# Patient Record
Sex: FEMALE | Race: WHITE | NOT HISPANIC OR LATINO | Employment: FULL TIME | URBAN - METROPOLITAN AREA
[De-identification: names, ages, dates, MRNs, and addresses within clinical notes are randomized per-mention and may not be internally consistent; named-entity substitution may affect disease eponyms.]

---

## 2022-09-19 ENCOUNTER — TRANSFERRED RECORDS (OUTPATIENT)
Dept: HEALTH INFORMATION MANAGEMENT | Facility: CLINIC | Age: 57
End: 2022-09-19

## 2022-12-02 ENCOUNTER — CARE COORDINATION (OUTPATIENT)
Dept: ENDOCRINOLOGY | Facility: CLINIC | Age: 57
End: 2022-12-02

## 2022-12-02 NOTE — PROGRESS NOTES
Called patient to remind her of her in person visit with Neyda Campos on Monday. I reminded her to do her questionnaire as well. She acknowledged this.     Carol Mariano, EMT

## 2022-12-05 ENCOUNTER — LAB (OUTPATIENT)
Dept: LAB | Facility: CLINIC | Age: 57
End: 2022-12-05
Payer: COMMERCIAL

## 2022-12-05 ENCOUNTER — OFFICE VISIT (OUTPATIENT)
Dept: ENDOCRINOLOGY | Facility: CLINIC | Age: 57
End: 2022-12-05
Payer: COMMERCIAL

## 2022-12-05 ENCOUNTER — TELEPHONE (OUTPATIENT)
Dept: ENDOCRINOLOGY | Facility: CLINIC | Age: 57
End: 2022-12-05

## 2022-12-05 VITALS
OXYGEN SATURATION: 98 % | HEIGHT: 62 IN | DIASTOLIC BLOOD PRESSURE: 71 MMHG | HEART RATE: 86 BPM | SYSTOLIC BLOOD PRESSURE: 133 MMHG | BODY MASS INDEX: 46.46 KG/M2 | WEIGHT: 252.5 LBS

## 2022-12-05 DIAGNOSIS — E66.01 CLASS 3 SEVERE OBESITY WITH SERIOUS COMORBIDITY AND BODY MASS INDEX (BMI) OF 45.0 TO 49.9 IN ADULT, UNSPECIFIED OBESITY TYPE (H): ICD-10-CM

## 2022-12-05 DIAGNOSIS — E66.813 CLASS 3 SEVERE OBESITY WITH SERIOUS COMORBIDITY AND BODY MASS INDEX (BMI) OF 45.0 TO 49.9 IN ADULT, UNSPECIFIED OBESITY TYPE (H): ICD-10-CM

## 2022-12-05 DIAGNOSIS — R73.03 PRE-DIABETES: ICD-10-CM

## 2022-12-05 DIAGNOSIS — Z86.711 HISTORY OF PULMONARY EMBOLISM: Primary | ICD-10-CM

## 2022-12-05 PROBLEM — M19.011 ARTHRITIS OF RIGHT ACROMIOCLAVICULAR JOINT: Status: ACTIVE | Noted: 2022-10-11

## 2022-12-05 PROBLEM — M25.641 STIFFNESS OF JOINTS OF BOTH HANDS: Status: ACTIVE | Noted: 2018-10-18

## 2022-12-05 PROBLEM — Z79.82 ASPIRIN LONG-TERM USE: Status: ACTIVE | Noted: 2020-09-09

## 2022-12-05 PROBLEM — M16.12 OSTEOARTHRITIS OF ONE HIP, LEFT: Status: ACTIVE | Noted: 2021-03-18

## 2022-12-05 PROBLEM — M85.89 OSTEOPENIA OF MULTIPLE SITES: Status: ACTIVE | Noted: 2022-08-09

## 2022-12-05 PROBLEM — H26.9 CATARACT, LEFT: Status: ACTIVE | Noted: 2017-06-16

## 2022-12-05 PROBLEM — M25.642 STIFFNESS OF JOINTS OF BOTH HANDS: Status: ACTIVE | Noted: 2018-10-18

## 2022-12-05 PROBLEM — Z90.710 H/O: HYSTERECTOMY: Status: ACTIVE | Noted: 2020-09-20

## 2022-12-05 PROBLEM — M17.0 OSTEOARTHRITIS OF BOTH KNEES, UNSPECIFIED OSTEOARTHRITIS TYPE: Status: ACTIVE | Noted: 2018-02-15

## 2022-12-05 PROBLEM — Z96.651 S/P TKR (TOTAL KNEE REPLACEMENT) USING CEMENT, RIGHT: Status: ACTIVE | Noted: 2018-02-28

## 2022-12-05 LAB
ALBUMIN SERPL BCG-MCNC: 3.4 G/DL (ref 3.5–5.2)
ALP SERPL-CCNC: 257 U/L (ref 35–104)
ALT SERPL W P-5'-P-CCNC: 15 U/L (ref 10–35)
ANION GAP SERPL CALCULATED.3IONS-SCNC: 9 MMOL/L (ref 7–15)
AST SERPL W P-5'-P-CCNC: 39 U/L (ref 10–35)
BILIRUB SERPL-MCNC: 0.4 MG/DL
BUN SERPL-MCNC: 14.4 MG/DL (ref 6–20)
CALCIUM SERPL-MCNC: 9.3 MG/DL (ref 8.6–10)
CHLORIDE SERPL-SCNC: 109 MMOL/L (ref 98–107)
CHOLEST SERPL-MCNC: 119 MG/DL
CREAT SERPL-MCNC: 1.09 MG/DL (ref 0.51–0.95)
DEPRECATED HCO3 PLAS-SCNC: 25 MMOL/L (ref 22–29)
ERYTHROCYTE [DISTWIDTH] IN BLOOD BY AUTOMATED COUNT: 16.8 % (ref 10–15)
GFR SERPL CREATININE-BSD FRML MDRD: 59 ML/MIN/1.73M2
GLUCOSE SERPL-MCNC: 101 MG/DL (ref 70–99)
HBA1C MFR BLD: 6 %
HCT VFR BLD AUTO: 39.4 % (ref 35–47)
HDLC SERPL-MCNC: 47 MG/DL
HGB BLD-MCNC: 12.4 G/DL (ref 11.7–15.7)
LDLC SERPL CALC-MCNC: 54 MG/DL
MCH RBC QN AUTO: 30.9 PG (ref 26.5–33)
MCHC RBC AUTO-ENTMCNC: 31.5 G/DL (ref 31.5–36.5)
MCV RBC AUTO: 98 FL (ref 78–100)
NONHDLC SERPL-MCNC: 72 MG/DL
PLATELET # BLD AUTO: 206 10E3/UL (ref 150–450)
POTASSIUM SERPL-SCNC: 3.9 MMOL/L (ref 3.4–5.3)
PROT SERPL-MCNC: 7.2 G/DL (ref 6.4–8.3)
PTH-INTACT SERPL-MCNC: 32 PG/ML (ref 15–65)
RBC # BLD AUTO: 4.01 10E6/UL (ref 3.8–5.2)
SODIUM SERPL-SCNC: 143 MMOL/L (ref 136–145)
TRIGL SERPL-MCNC: 90 MG/DL
WBC # BLD AUTO: 7 10E3/UL (ref 4–11)

## 2022-12-05 PROCEDURE — 80061 LIPID PANEL: CPT | Performed by: PATHOLOGY

## 2022-12-05 PROCEDURE — 99207 E-CONSULT TO HEMATOLOGY (ADULT OUTPT PROVIDER TO SPECIALIST WRITTEN QUESTION & RESPONSE): CPT

## 2022-12-05 PROCEDURE — 83036 HEMOGLOBIN GLYCOSYLATED A1C: CPT

## 2022-12-05 PROCEDURE — 99205 OFFICE O/P NEW HI 60 MIN: CPT

## 2022-12-05 PROCEDURE — 85027 COMPLETE CBC AUTOMATED: CPT | Performed by: PATHOLOGY

## 2022-12-05 PROCEDURE — 80053 COMPREHEN METABOLIC PANEL: CPT | Performed by: PATHOLOGY

## 2022-12-05 PROCEDURE — 36415 COLL VENOUS BLD VENIPUNCTURE: CPT | Performed by: PATHOLOGY

## 2022-12-05 PROCEDURE — 82306 VITAMIN D 25 HYDROXY: CPT

## 2022-12-05 PROCEDURE — 83970 ASSAY OF PARATHORMONE: CPT | Performed by: PATHOLOGY

## 2022-12-05 RX ORDER — SOTALOL HYDROCHLORIDE 80 MG/1
80 TABLET ORAL 2 TIMES DAILY
COMMUNITY
Start: 2022-11-21

## 2022-12-05 RX ORDER — OXYCODONE HYDROCHLORIDE 5 MG/1
5 TABLET ORAL DAILY PRN
Status: ON HOLD | COMMUNITY
Start: 2022-11-30 | End: 2023-08-30

## 2022-12-05 RX ORDER — BUDESONIDE, GLYCOPYRROLATE, AND FORMOTEROL FUMARATE 160; 9; 4.8 UG/1; UG/1; UG/1
2 AEROSOL, METERED RESPIRATORY (INHALATION) 2 TIMES DAILY
COMMUNITY
Start: 2022-01-25

## 2022-12-05 RX ORDER — PANTOPRAZOLE SODIUM 40 MG/1
40 TABLET, DELAYED RELEASE ORAL AT BEDTIME
COMMUNITY
Start: 2022-05-27 | End: 2023-10-19

## 2022-12-05 RX ORDER — ASPIRIN 81 MG/1
81 TABLET, CHEWABLE ORAL DAILY
Status: ON HOLD | COMMUNITY
Start: 2021-07-02 | End: 2023-08-30

## 2022-12-05 RX ORDER — METFORMIN HCL 500 MG
500 TABLET, EXTENDED RELEASE 24 HR ORAL
Status: ON HOLD | COMMUNITY
Start: 2022-09-21 | End: 2023-08-30

## 2022-12-05 RX ORDER — METOPROLOL SUCCINATE 25 MG/1
25 TABLET, EXTENDED RELEASE ORAL DAILY
COMMUNITY
Start: 2021-01-22

## 2022-12-05 RX ORDER — BUSPIRONE HYDROCHLORIDE 10 MG/1
10 TABLET ORAL 3 TIMES DAILY
COMMUNITY
Start: 2022-11-21

## 2022-12-05 RX ORDER — FUROSEMIDE 20 MG
20 TABLET ORAL DAILY
Status: ON HOLD | COMMUNITY
Start: 2021-12-20 | End: 2023-08-30

## 2022-12-05 RX ORDER — VENLAFAXINE 50 MG/1
50 TABLET ORAL DAILY
COMMUNITY
Start: 2022-04-19 | End: 2023-08-18

## 2022-12-05 RX ORDER — GALCANEZUMAB 120 MG/ML
INJECTION, SOLUTION SUBCUTANEOUS
COMMUNITY
Start: 2022-04-26

## 2022-12-05 RX ORDER — FAMOTIDINE 40 MG/1
1 TABLET, FILM COATED ORAL AT BEDTIME
COMMUNITY
Start: 2022-09-21 | End: 2024-01-02

## 2022-12-05 RX ORDER — NYSTATIN 100000 [USP'U]/G
60 POWDER TOPICAL PRN
COMMUNITY
Start: 2022-11-14

## 2022-12-05 RX ORDER — POTASSIUM CHLORIDE 750 MG/1
10 TABLET, EXTENDED RELEASE ORAL DAILY
COMMUNITY
Start: 2022-11-01

## 2022-12-05 RX ORDER — ACETAMINOPHEN 325 MG/1
650 TABLET ORAL EVERY 6 HOURS PRN
COMMUNITY
Start: 2021-07-02

## 2022-12-05 RX ORDER — TOPIRAMATE 100 MG/1
100 TABLET, FILM COATED ORAL AT BEDTIME
COMMUNITY
Start: 2022-04-26

## 2022-12-05 RX ORDER — BENZONATATE 200 MG/1
200 CAPSULE ORAL 3 TIMES DAILY PRN
COMMUNITY
Start: 2022-09-28

## 2022-12-05 RX ORDER — ALBUTEROL SULFATE 90 UG/1
1-2 AEROSOL, METERED RESPIRATORY (INHALATION) EVERY 4 HOURS PRN
COMMUNITY
Start: 2022-06-16

## 2022-12-05 RX ORDER — TRAZODONE HYDROCHLORIDE 150 MG/1
150 TABLET ORAL AT BEDTIME
COMMUNITY
Start: 2022-11-11 | End: 2023-08-18

## 2022-12-05 RX ORDER — BUTALBITAL, ACETAMINOPHEN AND CAFFEINE 50; 325; 40 MG/1; MG/1; MG/1
1 TABLET ORAL PRN
COMMUNITY
Start: 2022-04-26

## 2022-12-05 RX ORDER — GABAPENTIN 300 MG/1
900 CAPSULE ORAL 3 TIMES DAILY
Status: ON HOLD | COMMUNITY
Start: 2022-02-07 | End: 2023-08-30

## 2022-12-05 RX ORDER — MULTIVITAMIN WITH IRON
250 TABLET ORAL
COMMUNITY

## 2022-12-05 RX ORDER — PROCHLORPERAZINE MALEATE 5 MG
TABLET ORAL
COMMUNITY
Start: 2022-04-26

## 2022-12-05 RX ORDER — ROSUVASTATIN CALCIUM 40 MG/1
1 TABLET, COATED ORAL AT BEDTIME
COMMUNITY
Start: 2022-11-10

## 2022-12-05 ASSESSMENT — PAIN SCALES - GENERAL: PAINLEVEL: SEVERE PAIN (6)

## 2022-12-05 NOTE — TELEPHONE ENCOUNTER
PA Initiation    Medication: Ozempic  Insurance Company: [employer] ([facility]) - Phone [phone] Fax [phone]  Pharmacy Filling the Rx:    Filling Pharmacy Phone:    Filling Pharmacy Fax:    Start Date: 12/5/2022    Key: [id number]

## 2022-12-05 NOTE — PATIENT INSTRUCTIONS
"Thank you for allowing us the privilege of caring for you. We hope we provided you with the excellent service you deserve.   Please let us know if there is anything else we can do for you so that we can be sure you are completely satisfied with your care experience.    To ensure the quality of our services you may be receiving a patient satisfaction survey from an independent patient satisfaction monitoring company.    The greatest compliment you can give is a \"Likely to Recommend\"    Your visit was with BHANU TRONCOSO PA-C today.    Instructions per today's visit:     Robert Best, it was great to visit with you today.  Here is a review of our visit.  If our clinic scheduler is not able to reach you please call [phone] to schedule your next appointments.    If you have not already watched our online seminar please go to [email or web address]    Weight loss requirement: 10lbs prior to surgery. Will have final weight check 2-3 weeks prior to surgery at anesthesia or nurse pre-op teaching visit.    -Need current weight confirmation at primary clinic or weight management clinic No    Bariatric labs ordered, call for a lab only appointment at any [facility] lab. To find a lab location near you, please call [phone]. Please let us know if orders need to be faxed to a non [facility] lab.    Schedule bariatric psych eval as soon as possible.  Call Pepe Conway at [phone] to schedule.       Call Pepe Conway at [phone] to discuss insurance coverage for bariatric surgery.  Please check with your insurance regarding bariatric surgery coverage also. Pepe can also help you with scheduling psych eval if you are having difficulties.    The following clearance letters are needed: Letter templates will be sent to you via [employer] or given to you in clinic. Providers can submit through electronic medical record or fax to [phone].  - Letter of support from primary care provider.   - " Therapist  - Cardiologist   - Hematology   - Pulmonology   - Neurology     Smoking cessation and nicotine test needed: No    Birth control after surgery discussed. Patient instructed that 2 forms of birth control required after surgery and to avoid pregnancy for at least 18 months after surgery: hysterectomy     NEXT VISITS: A  should reach out to you to schedule the following appointments.  If they do not reach you please call [phone] to schedule the following appointments:    -See dietitian in 1 month and monthly for 3 months    -See Lauren Bloch MTM pharmacist in 1 month to follow up on weight loss medications and polypharmacy     -See Neyda Toro in 2 months to follow up on pre-op weight loss and weight loss medications    -See Dr Higgins in 1 month for bariatric surgeon visit. Discuss bariatric surgery.  Important items to discuss RYBP           Information about Video Visits with [facility]: video visit information  _________________________________________________________________________________________________________________________________________________________  If you are asked by your clinic team to have your blood pressure checked:  [facility] do offer several locations for blood pressure checks. Please follow the below link to schedule an appointment. Scheduling an appointment at the pharmacy for a blood pressure check is now preferred.    Appointment Plus (appointment-plus.[employer])  _________________________________________________________________________________________________________________________________________________________  Important contact and scheduling information:  Please call our contact center at [phone] to schedule your next appointments.  To find a lab location near you, please call [phone].  For any nursing questions or concerns call Kymberly Montemayor LPN at [phone] or Simona Maier RN at [phone]  Please call during clinic hours Monday  through Friday 8:00a - 4:00p if you have questions or you can contact us via [email or web address] at anytime and we will reply during clinic hours.    Lab results will be communicated through My Chart or letter (if My Chart not used). Please call the clinic if you have not received communication after 1 week or if you have any questions.?  Clinic Fax: [phone]    _________________________________________________________________________________________________________________________________________________________  Meal Replacement Products:    Here is the link to our new e-store where you can purchase our meal replacement products    [employer] E-Store  [employer]/store    The one week starter kit is a great way to sample a variety of products and see what works for you.    If you want more information about the product go to: Fresh Life is Tech Meals  [employer]    If you are an employee or [facility] or  [employer] please contact your care team for a 10% estore discount    Free Shipping for orders over $75     Benefits of meal replacements products:    Portion and calorie control  Improved nutrition  Structured eating  Simplified food choices  Avoid contact with trigger foods  _________________________________________________________________________________________________________________________________________________________  Interested in working with a health ?  Health coaches work with you to improve your overall health and wellbeing.  They look at the whole person, and may involve discussion of different areas of life, including, but not limited to the four pillars of health (sleep, exercise, nutrition, and stress management). Discuss with your care team if you would like to start working a health .  Health Coaching-3 Pack: Schedule by calling [phone]    $99 for three health coaching visits    Visits may be done in person or via phone    Coaching is a  partnership between the  and the client; Coaches do not prescribe or diagnose    Coaching helps inspire the client to reach his/her personal goals   _________________________________________________________________________________________________________________________________________________________  24 Week Healthy Lifestyle Plan:    Our mission in the 24-week Healthy Lifestyle Plan is to provide you with individualized care by giving you the tools, education and support you need to lose weight and maintain a healthy lifestyle. In your 24-week journey, you ll be supported by a dedicated weight loss team that includes registered dietitians, medical weight management providers, health coaches, and nurses -- all with special expertise in weight loss -- to help you every step of the way.     Monthly meetings with your registered dietician or medical weight management provider help to review your progress, update your care plan, and make any adjustments needed to ensure success. Between these visits, weekly and bi-weekly health  visits will help you focus on the four pillars of weight loss -- stress, sleep, nutrition, and exercise -- and how you can best adapt each to achieve sustainable weight loss results.    In addition, you will be given exclusive access to online wellbeing classes through [employer].  Your initial visit will be with a medical weight management provider who will help to understand your weight loss goals and ensure this program is the right fit for you. Please let our team know if you are interested in the 24 week plan by sending a message to your care team or calling [phone] to schedule.  _________________________________________________________________________________________________________________________________________________________    COMPREHENSIVE WEIGHT MANAGEMENT PROGRAM  VIRTUAL SUPPORT GROUPS    For Support Group Information:      We offer support groups for patients who  "are working on weight loss and considering, preparing for or have had weight loss surgery.   There is no cost for this opportunity.  You are invited to attend the?Virtual Support Groups?provided by any of the following locations:    [facility] via Microsoft Teams with Gemini Epperson RN  2.   [place] via [employer] with Daren Kline, PhD, LP  3.   [place] via [employer] [facility] with Georgia Abrams RN  4.   [facility] via [employer] Teams with Georgia Cosby, [facility]    The following Support Group information can also be found on our website:  [email or web address]    [facility] Weight Loss Surgery Support Group    [facility] Weight Loss Surgery Support Group  The support group is a patient-lead forum that meets monthly to share experiences, encouragement and education. It is open to those who have had weight loss surgery, are scheduled for surgery, and those who are considering surgery.   WHEN: This group meets on the 3rd Wednesday of each month from 5:00PM - 6:00PM virtually using Microsoft Teams.   FACILITATOR: Led by Gemini Epperson, RD, LD, RN, the program's Clinical Coordinator.   TO REGISTER: Please contact the clinic via [employer] or call the nurse line directly at [phone] to inform our staff that you would like an invite sent to you and to let us know the email you would like the invite sent to. Prior to the meeting, a link with directions on how to join the meeting will be sent to you.    2022 Meetings  Ivelisse 15: \"Let's Talk\" a time for the group to share.  July 20: \"Let's Talk\" a time for the group to share.  August 17: \"Let's Talk\" a time for the group to share.  September 21: \"Let's Talk\" a time for the group to share.  October 19: Guest Speaker: Dr Gianluca Martinez MD Pulmonologist and Sleep Medicine Physician, \"Getting a Good Night's Sleep\".  November 16: \"Let's Talk\" a time for the group to share.  December 21: \"Let's " "Talk\" a time for the group to share.    [facility] Support Groups    Connections: Bariatric Care Support Group?  This is open to all [facility] (and those external to this program) pre- and post- operative bariatric surgery patients as well as their support system.   WHEN: This group meets the 2nd Tuesday of each month from 6:30 PM - 8:00 PM virtually using Microsoft Teams.   FACILITATOR: Led by Daren Kline, Ph.D who is a Licensed Psychologist with the [facility] Comprehensive Weight Management Program.   TO REGISTER: Please send an email to Daren Kline, Ph.D., LP at?[email or web address]?if you would like an invitation to the group and to learn about using Microsoft Teams.    2022 Meetings June 14: Gracy Roth, DELTA, LD at [facility], \"Nutritional Labeling\"  July 12 August 2 (Please Note Date Change)  September 13 October 11 November 8 December 13    Connections: Post-Operative Bariatric Surgery Support Group  This is a support group for [facility] bariatric patients (and those external to [facility]) who have had bariatric surgery and are at least 3 months post-surgery.  WHEN: This support group meets the 4th Wednesday of the month from 11:00 AM - 12:00 PM virtually using Microsoft Teams.   FACILITATOR: Led by Certified Bariatric Nurse, Georgia Abrams RN.   TO REGISTER: Please send an email to Georgia at [email or web address] if you would like an invitation to the group and to learn about using Microsoft Teams.    2022 Meetings June 22 July 27 August 24 September 28 October 26 November 23 December 28      [facility] Healthy Lifestyle Virtual Support Group    Healthy Lifestyle Virtual Support Group?  This is 60 minutes of small group guided discussion, support and resources. All are welcome who want a healthy lifestyle.  WHEN: This group meets monthly on a Friday from " "12:30 PM - 1:30 PM virtually using Microsoft Teams.   FACILITATOR: Led by National Board Certified Health and , Georgia Cosby [facility].   TO REGISTER: Please send an email to Georgia [email or web address] to receive monthly invites to the group or if you have any questions about having a health .  Prior to the meeting, a link with directions on how to join the meeting will be sent to you.    2022 Meetings  June 24: Georgia Cosby [facility], \"Setting Limits and Boundaries\".  Jul 29: Open Forum  August 26: Guest Speaker: Ronda Frereira Registered Dietitian  September 30: Open Forum  October 28th: Guest Speaker: Susie James [facility], Health , \"Gratitude Practices\".  November 18: Guest Speaker: Laura Trinidad RD Registered Dietitian, \"Navigating How to Eat around the Holidays\".  December 16: Guest Speaker: Awilda Flores [facility], \"Changing Your Relationship with Movement\".    ____________________________________________________________________________________________________________________________________________________________________________  [facility] Get Moving Program  Our team of physical therapists is trained to help you understand and take control of your condition. They will perform a thorough evaluation to determine your ability for activity and develop a customized plan to fit your goals and physical ability.  Scheduling: Unsure if the Get Moving program is right for you? Discuss the program with your medical provider or diabetes educator. You can also call us at [phone] to ask questions or schedule an appointment.   [facility] Get Moving Program  ____________________________________________________________________________________________________________________________________________________________________________  [facility] Diabetes Prevention Program (DPP)  If you have prediabetes and Medicare please contact us via MyChart to learn more about the Diabetes " Prevention Program (DPP)  Program Details:  [facility] offers the year-long Diabetes Prevention Program (DPP). The program helps you to make lifestyle changes that prevent or delay type 2 diabetes by supporting healthy eating, increased physical activity, stress reduction and use of coping skills.   On average, previous [facility] DPP cohorts have lost and maintained at least 5% of their starting weight throughout the program and averaged more than 150 minutes of physical activity per week.  Participants meet weekly for one-hour group sessions over sixteen weeks, every other week for the next 8 weeks, and monthly for the last six months.   A year-long maintenance program is also available for participants who complete the first year.   Location & Cost:   During the COVID-19 Public Health Emergency, the program is offered virtually. When in-person classes can resume, they will be held at [facility].  For people with Medicare, the program is covered in full. A self-pay option will also be available for those with non-Medicare insurance plans.   _________________________________________________________________________________________________________________________________________________________  Bluetooth Scale:    We hope to provide you with high quality virtual healthcare visits while social distancing for COVID-19 is necessary, as well as in the future when virtual visits may be more convenient for you.     Our technology team made it possible for Bluetooth scales to send weight measurements to our electronic medical record. This allows weights from you weighing at home to securely flow into the medical record, which will improve telephone and virtual visits.   Additionally, studies have shown that adults actually lose more weight when their weights are automatically sent to someone else, and also that this process is not stressful for those adults.    Below is a link  for purchasing the scale, with a discount code for our patients. You may call your insurance company to see if they will reimburse you for the cost of the scale, as a piece of durable medical equipment. The scales only go up to a weight of 400 pounds. This is an issue and we are working with the developer on increasing this. We found no scales that go over 400lb that have blue-tooth for connecting to [employer]O365.    Scale to purchase: the Appointedd  Body  Scale: https://www.[email or web address]/us/en/body/shop?[email or web address]=aw.ds    Discount Code: We have a discount code for our patients to bring the cost down to $50, Discount code is: [place]_Scale_20%off  _______________________________________________________________________________________________________________________________________________________________________________    To work with a Behavioral Health Psychologist:    Call to schedule:    Vitaliy Smith - [phone]  Roseanna Dudley - [phone]  Jhoana Wong - [phone]  Evie Bush - [phone]   Kacy Manuel PhD (cannot accept Medicare) [phone]        Thank you,   [facility] Comprehensive Weight Management Team

## 2022-12-05 NOTE — TELEPHONE ENCOUNTER
"Prior Authorization Retail Medication Request    Medication/Dose: Ozempic  ICD code (if different than what is on RX):  Pre-diabetes [R73.03]     Previously Tried and Failed:  Watching portions or calories, Exercise, Topamax/Topiramate, Metformin    Rationale:  I had the pleasure of seeing your patient, Shira Best, to evaluate her obesity and consider her for possible weight loss surgery. As you know, Shira Best is 57 year old.  She has a height of 5' 2\", a weight of 252 lbs 8 oz, and calculated Body mass index is 46.18 kg/m . CO-MORBIDITIES OF OBESITY INCLUDE: Pre-Diabetes, Heart Disease, High Blood Pressure, High Cholesterol, Polycystic Ovarian Syndrome, GERD (Reflux), Osteoarthritis (joint disease)    Obesity onset in childhood. Weight gain has been gradual. Increased weight gain after college, started working at night and would eat late nights. Was able to lose 108lbs after her first heart attack in 2018 with low salt diet and decrease portion sizes. Lowest weight around 150lbs. Was able to sustain that weight for around 3 years.     Activity - will get injured every time will start to work out. Feels very discouraged. Able to walk around without issues. Can walk 100yards until needs to rest.      [facility] - working with psychiatrist and dietician. Has been going there since before summer 2022.     AOMs:   - Metformin - has been held off due to shoulder surgery. Restarting tomorrow. Helps with hunger control. Denies side effects   - Topiramate - for migraines. Does not help with hunger. Denies side effects    Pre-diabetes - A1C 5.8 7/19/2022.      CAD - followed by cardiology. Has pacemaker and defibrillator. Well controlled on medications. Echo was good. No recent stress test      HLD - controlled with medications      HTN - controlled with medications. Does not check at home.      GERD - Well controlled with medications. Symptoms used to be regurg and burning in back of throat. Trigged by " tomatoes and peanutbutter. EGD was normal      Fibromyalgia - Followed by PCP. Controlled with tylenol.      COPD -  Takes inhaler daily. Has had increased SOB due to recent weight gain. No recent exacerbations. Not on oxygen. Followed by pulmonology prior, now is followed by PCP.      Migraines - imgality once a month and topiramate.     Insurance Name:    Insurance ID:        Pharmacy Information (if different than what is on RX)  Name:  [facility] [address] [place] [address]  Phone:  [phone]

## 2022-12-05 NOTE — LETTER
Dear Primary Care Provider:    Re: Shira Best  : 1965    Thank you for coordinating with us to evaluate your patient for possible bariatric surgery.  It is important to us that the primary care provider is involved at all stages of this process.    Please provide the following letter and labs prior to the patient's next visit in our clinic.    1. A letter of support including:    a. A statement supporting the patient's decision to have weight loss surgery.   b.The length of time your patient has been morbidly obese and length of time attempting to lose weight.   c. Your patient's history of weight loss attempts including diets, medications,                     exercise and lifestyle interventions.   d. Summary of your patient's medical and surgical history.   e. Current problem list.   f.  A statement indicating patient is up to date with their age and gender                   appropriate screenings.   g. Two years of dates and weights (two weights per year is sufficient) or a flowsheet of dates and weights.   h. Medication list    2. Please order the following baseline labs and treat any abnormal results: comprehensive metabolic panel, complete blood count, lipids, parathormone, vitamin D, TSH and A1C or send results done within the last 6 months.    If patient meets criteria and clearances for surgery, we will submit to the insurance company for insurance approval and coordinate the needed appointments with our department.  If you have any questions, feel free to contact us via our Call Center at [phone].  Please fax the evaluation to [phone] or route to Tracey Chaidez RN via Epic.    Sincerely,    MD Gal Gomez MD Eric Wise, MD Kristi Kopacz, PA-C Kayla Gish, CNP Mary Kate Hjelm, PA-C    Mailing Address:  [facility]  [address] [place] [address], [facility] [address], [place] [address]

## 2022-12-05 NOTE — NURSING NOTE
"(   Chief Complaint   Patient presents with     New Patient     New isma    )    ( Weight: 114.5 kg (252 lb 8 oz) )  ( Height: 157.5 cm (5' 2\") )  ( BMI (Calculated): 46.18 )  (   )  (   )  (   )  (   )  (   )  (   )    ( BP: 133/71 )  (   )  (   )  (   )  ( Pulse: 86 )  (   )  ( SpO2: 98 % )    ( There is no problem list on file for this patient.   )  (   Current Outpatient Medications   Medication Sig Dispense Refill     acetaminophen (TYLENOL) 325 MG tablet Take 650 mg by mouth       albuterol (PROAIR HFA/PROVENTIL HFA/VENTOLIN HFA) 108 (90 Base) MCG/ACT inhaler Inhale 1-2 puffs into the lungs       aspirin (ASA) 81 MG chewable tablet 81 mg       Budeson-Glycopyrrol-Formoterol (BREZTRI AEROSPHERE) 160-9-4.8 MCG/ACT AERO Inhale 2 puffs into the lungs 2 times daily       busPIRone (BUSPAR) 10 MG tablet Take 10 mg by mouth 3 times daily       famotidine (PEPCID) 40 MG tablet Take 1 tablet by mouth At Bedtime       furosemide (LASIX) 20 MG tablet Take 30 mg by mouth       gabapentin (NEURONTIN) 300 MG capsule Take 900 mg by mouth       galcanezumab-gnlm (EMGALITY) 120 MG/ML injection Inject 1 auto injector pen every month, rotate injection site locations each month. For monthly maintenance dosing to prevent headaches/migraines.       magnesium 250 MG tablet Take 250 mg by mouth       metFORMIN (GLUCOPHAGE XR) 500 MG 24 hr tablet Take 500 mg by mouth       metoprolol succinate ER (TOPROL XL) 25 MG 24 hr tablet TAKE 1 & 1/2 (ONE & ONE-HALF) TABLETS BY MOUTH ONCE DAILY -  DO  NOT  CRUSH  OR  CHEW       oxyCODONE (ROXICODONE) 5 MG tablet        pantoprazole (PROTONIX) 40 MG EC tablet TAKE 1 TABLET BY MOUTH ONCE DAILY DO  NOT  CRUSH       potassium chloride ER (K-TAB/KLOR-CON) 10 MEQ CR tablet        prochlorperazine (COMPAZINE) 5 MG tablet Take 1-2 tablets by mouth at onset of migraine headache, limit 3 times per week.       rosuvastatin (CRESTOR) 40 MG tablet Take 1 tablet by mouth At Bedtime       sotalol (BETAPACE) 80 " MG tablet TAKE 1 & 1/2 (ONE & ONE-HALF) TABLETS BY MOUTH TWICE DAILY       topiramate (TOPAMAX) 100 MG tablet TAKE 1 TABLET BY MOUTH IN THE MORNING AND 1 AT NIGHT       traZODone (DESYREL) 150 MG tablet Take 150 mg by mouth At Bedtime       venlafaxine (EFFEXOR) 50 MG tablet Take 50 mg by mouth 2 times daily (with meals)      )  ( Diabetes Eval:    )    ( Pain Eval:  Severe Pain (6) )    ( Wound Eval:       )    (   History   Smoking Status     Former     Types: Cigarettes   Smokeless Tobacco     Never    )    ( Signed By:  Carol Mariano, EMT; December 5, 2022; 12:13 PM )

## 2022-12-05 NOTE — LETTER
Date:December 7, 2022      Patient was self referred, no letter generated. Do not send.        [facility] Information

## 2022-12-05 NOTE — LETTER
"2022       RE: Shira Best  [address] [address] [facility] [address]     Dear Colleague,    Thank you for referring your patient, Shira Best, to the [facility] [place] at [facility]. Please see a copy of my visit note below.    75 minutes spent on the date of the encounter doing chart review, history and exam, documentation and further activities per the note    New Bariatric Surgery Consultation Note    2022    RE: Shira Best  MR#: [id number]  : 1965      Referring provider:       12/3/2022   Who referred you? Can't remember his name       Chief Complaint/Reason for visit: evaluation for possible weight loss surgery    Dear No primary care provider on file.,    I had the pleasure of seeing your patient, Shira Best, to evaluate her obesity and consider her for possible weight loss surgery. As you know, Shira Best is 57 year old.  She has a height of 5' 2\", a weight of 252 lbs 8 oz, and calculated Body mass index is 46.18 kg/m .    Assessment & Plan   Problem List Items Addressed This Visit        Digestive    Class 3 severe obesity with serious comorbidity and body mass index (BMI) of 45.0 to 49.9 in adult (H)     Obesity onset in childhood. Gradual weight gain through college and working nigh shifts. Weight loss of 108lbs previously, and was weight stable at 150lbs for around 3 years. Weight increased again due to ending of relationship and increase in injuries that have limited ability to exercise. Struggles most with cravings and increase portion sizes. History of Stage 2 COPD, HTN, HLD, GERD, Fibromyalgia, migraines, CAD, concussions, and OA.     Patient specifically would like to go through with RYBP because sister had gastric sleeve and has had extensive complications.     Currently taking Emgality for migraines. Discussed needing to stop 6 weeks prior to surgery " and 12 weeks post surgery. She is reach out to Neurology for migraine management during that time.     Fibromyalgia is currently controled with tylenol and opioids. She has seen pain clinic in the past, but did not find it to be helpful. PCP currently controls pain medications. She has had previous surgeries that were well controlled with opioids and tylenol. Discuss later if pain management is needed.     Discussed AOms:   - Phentermine - contraindicated due to history of heart disease and current pace maker/defibulator placement.   - Topiramte - currently on for migraines   - Naltrexone - contraindicated due to currently taking opioids as needed for fibromyalgia pain.   - GLP-1 to be started today to help with hunger and pre-diabetes. Ozempic if covered. Consider Saxenda if not. Side effects and benefits discussed. All questions answered.          Relevant Medications    metFORMIN (GLUCOPHAGE XR) 500 MG 24 hr tablet    semaglutide (OZEMPIC) 2 MG/1.5ML SOPN pen    Other Relevant Orders    CBC with platelets (Completed)    Hemoglobin A1c (Completed)    Comprehensive metabolic panel (Completed)    Lipid panel reflex to direct LDL Fasting (Completed)    Vitamin D Deficiency (Completed)    Parathyroid Hormone Intact (Completed)    Med Therapy Management Referral       Endocrine    Pre-diabetes    Relevant Medications    semaglutide (OZEMPIC) 2 MG/1.5ML SOPN pen    Other Relevant Orders    Med Therapy Management Referral   Other Visit Diagnoses     History of pulmonary embolism    -  Primary    Relevant Orders    Adult Oncology/Hematology  Referral    Adult E-Consult to Hematology (Outpt Provider to Specialist Written Question & Response)         1. Labs ordered   2. Start Ozempic 0.25mg for four weeks, then increase to 0.5mg for four weeks. If not covered will consider Saxenda.   3. Schedule Psych Eval   4. Letters of support/Clearance:   - PCP  - Therapy   - Cardiology   - Pulmonology   - Neurology   -  Hematology - referral placed   5. Dietitian 3x, and then monthly until surgery   6. Dr. Higgins in 1 month   7. MTM Pharmacist in 1 month for GLP-1 start and polypharmacy pre-surg   8. Follow up with Neyda Toro in 2 months       HISTORY OF PRESENT ILLNESS:  Weight Loss History Reviewed with Patient 12/3/2022   How long have you been overweight? Since early childhood   What is the most that you have ever weighed? 278   What is the most weight you have lost? 108   I have tried the following methods to lose weight Watching portions or calories, Exercise   I have tried the following weight loss medications? (Check all that apply) Topamax/Topiramate, Metformin   Have you ever had weight loss surgery? No     Obesity onset in childhood. Weight gain has been gradual. Increased weight gain after college, started working at night and would eat late nights. Was able to lose 108lbs after her first heart attack in 2018 with low salt diet and decrease portion sizes. Lowest weight around 150lbs. Was able to sustain that weight for around 3 years. Weight gain after relationship ended and started having injuries. Would like to go through surgery because she is unable to lose weight like she had in the past. Has become harder to exercise.     Eating 2 meals a day, with snacks. Has been decreasing candy and chewing gum instead. Decreasing pop. Does not struggle with hunger. Eats when she is board. Working with therpist and well clinic to help with food. Can get full and stay full. Craves sugar. Eats out 1xmonth.   Brunch - apple, bagel, sandwich + fruit, leftovers  Dinner - cereal, ribs, potatoes, salad  Snacks - apple, crackers, fruit, hummus, candy   Drinks - flavored water, vitamin water, sugar free power aid, milk, diet coke (2cans a day)  ETOH - rarely    Activity - will get injured every time will start to work out. Feels very discouraged. Able to walk around without issues. Can walk 100yards until needs to rest.     [facility] -  working with therapist and dietician. Has been going there since before summer 2022. Started on metformin    AOMs:   - Metformin - has been held off due to shoulder surgery. Restarting tomorrow. Helps with hunger control. Denies side effects   - Topiramate - for migraines. Does not help with hunger. Denies side effects    CO-MORBIDITIES OF OBESITY INCLUDE:     12/3/2022   I have the following health issues associated with obesity: Pre-Diabetes, Heart Disease, High Blood Pressure, High Cholesterol, Polycystic Ovarian Syndrome, GERD (Reflux), Osteoarthritis (joint disease)     Pre-diabetes - A1C 5.8 7/19/2022.     CAD - followed by cardiology. Has pacemaker and defibrillator. Well controlled on medications. Most recent Echo 1/31/2022 - EF 60-65%, mild tricuspid regurg. No recent stress test     HLD - controlled with medications     HTN - controlled with medications. Does not check at home.     GERD - Well controlled with medications. Symptoms used to be regurg and burning in back of throat. Trigged by tomatoes and peanutbutter. EGD was normal     Fibromyalgia - Followed by PCP. Controlled with tylenol and opioids.     COPD -  Takes inhaler daily. Has had increased SOB due to recent weight gain. No recent exacerbations. Not on oxygen. Followed by pulmonology prior, now is followed by PCP.     Migraines -Emgality once a month and topiramate.           History of bleeding or clotting disorder?  3/14/2015 - bilateral pulmonary embolus, [facility] after placement of AICD     Is patient on biologics or immunomodulators? Emgality     PAST MEDICAL HISTORY:  No past medical history on file.    PAST SURGICAL HISTORY:  No past surgical history on file.  Left total hip replaced   Bilateral total knee replaced  Neuroma removal in toes  Carpal tunnel surgery bilateral   Left hand surgery   Right shoulder surgery   Hysterectomy   No other surgical history       FAMILY HISTORY:   No family history on file.    SOCIAL  HISTORY:   Social History Questions Reviewed With Patient 12/3/2022   Which best describes your employment status (select all that apply) I work full-time, I work days   If you work, what is your occupation?    Which best describes your marital status: single   Do you have children? No   Who do you have in your support network that can be available to help you for the first 2 weeks after surgery? Yes my family   Who can you count on for support throughout your weight loss surgery journey? My sister and niece my mom   Can you afford 3 meals a day?  No   Can you afford 50-60 dollars a month for vitamins? No   Works full time as a  for time. Lives at home to help take care of mother with [employer]imerize.     HABITS:     12/3/2022   How often do you drink alcohol? Never   Have you ever used any of the following nicotine products? Cigarettes   If you previously used any of these products, what year did you quit? 2009   Have you or are you currently using street drugs or prescription strength medication for which you do not have a prescription for? No   Do you have a history of chemical dependency (alcohol or drug abuse)? No     Nicotine quit in 2009.       Currently taking narcotic/opioids Yes    PSYCHOLOGICAL HISTORY:   Psychological History Reviewed With Patient 12/3/2022   Have you ever attempted suicide? Never.   Have you had thoughts of suicide in the past year? No   Have you ever been hospitalized for mental illness or a suicide attempt? Never.   Do you have a history of chronic pain? Yes   Have you ever been diagnosed with fibromyalgia? Yes   Are you currently being treated for any of the following? (select all that apply) Anxiety   Are you currently seeing a therapist or counselor?  Yes   Are you currently seeing a psychiatrist? Yes     Anxiety - mood is well controled with medications. Followed by therapist. Primary care prescribes meds.     ROS:     12/3/2022   Skin:  Skin fold rashes (groin  or other folds)   HEENT: Headaches, Missing teeth   If you answered yes to missing teeth, please indicate how many: 5   Musculoskeletal: Joint Pain, Back pain, Limited mobility, Swelling of legs, Arthritis   Cardiovascular: Chest pain, Shortness of breath with activity   Pulmonary: Shortness of breath with activity, Snoring, Experience morning headaches   Gastrointestinal: Heartburn, Reflux   Genitourinary: Stress incontinence (losing urine when coughing, sneezing, etc.)   Hematological: None of the above   Neurological: Migraine headaches   Female only: Irregular menstrual cycles, Polycystic ovarian syndrome (PCOS), Post-menopausal, None of the above     Missing teeth with partial dentures - 7 missing, able to chew with no concerns.     EATING BEHAVIORS:     12/3/2022   Have you or anyone else thought that you had an eating disorder? No   Do you currently binge eat (eat a large amount of food in a short time)? No   Are you an emotional eater? Yes   Do you get up to eat after falling asleep? No       EXERCISE:     12/3/2022   How often do you exercise? 1 to 2 times per week   What is the duration of your exercise (in minutes)? 30 Minutes   What types of exercise do you do? home gym   What keeps you from being more active?  Pain, I have just had surgery on one or more of my joints       MEDICATIONS:  Current Outpatient Medications   Medication Sig Dispense Refill     acetaminophen (TYLENOL) 325 MG tablet Take 650 mg by mouth       albuterol (PROAIR HFA/PROVENTIL HFA/VENTOLIN HFA) 108 (90 Base) MCG/ACT inhaler Inhale 1-2 puffs into the lungs       aspirin (ASA) 81 MG chewable tablet 81 mg       Budeson-Glycopyrrol-Formoterol (BREZTRI AEROSPHERE) 160-9-4.8 MCG/ACT AERO Inhale 2 puffs into the lungs 2 times daily       busPIRone (BUSPAR) 10 MG tablet Take 10 mg by mouth 3 times daily       butalbital-acetaminophen-caffeine (ESGIC) -40 MG tablet Take 1 tablet by mouth as needed       famotidine (PEPCID) 40 MG  tablet Take 1 tablet by mouth At Bedtime       furosemide (LASIX) 20 MG tablet Take 30 mg by mouth       gabapentin (NEURONTIN) 300 MG capsule Take 900 mg by mouth       galcanezumab-gnlm (EMGALITY) 120 MG/ML injection Inject 1 auto injector pen every month, rotate injection site locations each month. For monthly maintenance dosing to prevent headaches/migraines.       magnesium 250 MG tablet Take 250 mg by mouth       metFORMIN (GLUCOPHAGE XR) 500 MG 24 hr tablet Take 500 mg by mouth       metoprolol succinate ER (TOPROL XL) 25 MG 24 hr tablet TAKE 1 & 1/2 (ONE & ONE-HALF) TABLETS BY MOUTH ONCE DAILY -  DO  NOT  CRUSH  OR  CHEW       oxyCODONE (ROXICODONE) 5 MG tablet        pantoprazole (PROTONIX) 40 MG EC tablet TAKE 1 TABLET BY MOUTH ONCE DAILY DO  NOT  CRUSH       potassium chloride ER (K-TAB/KLOR-CON) 10 MEQ CR tablet        prochlorperazine (COMPAZINE) 5 MG tablet Take 1-2 tablets by mouth at onset of migraine headache, limit 3 times per week.       rosuvastatin (CRESTOR) 40 MG tablet Take 1 tablet by mouth At Bedtime       semaglutide (OZEMPIC) 2 MG/1.5ML SOPN pen Inject 0.25 mg subcutaneously once weekly for 4 weeks then 0.5 mg once weekly. 3 mL 1     sotalol (BETAPACE) 80 MG tablet TAKE 1 & 1/2 (ONE & ONE-HALF) TABLETS BY MOUTH TWICE DAILY       topiramate (TOPAMAX) 100 MG tablet TAKE 1 TABLET BY MOUTH IN THE MORNING AND 1 AT NIGHT       traZODone (DESYREL) 150 MG tablet Take 150 mg by mouth At Bedtime       venlafaxine (EFFEXOR) 50 MG tablet Take 50 mg by mouth 2 times daily (with meals)       benzonatate (TESSALON) 200 MG capsule Take 200 mg by mouth as needed       nystatin (MYCOSTATIN) 848348 UNIT/GM external powder Apply 60 g topically as needed       riboflavin 100 MG CAPS Take 1 tablet by mouth 2 times daily         ALLERGIES:  Allergies   Allergen Reactions     Amitriptyline      Tongue swells up and gets little dots on tongue     Amoxicillin      Sore tongue (no problems with Amoxicillin)  ++ has  "tolerated cephalexin and cefazolin ++     Sulindac      Lip swelling  3-7-17: Has tolerated other NSAIDs           Anti-obesity medication ROS:    HEENT  Hx of glaucoma: No    Cardiovascular  CAD:Yes  HTN:Yes    Gastrointestinal  GERD:Yes  Constipation:No  Liver Dz:No  H/O Pancreatitis:No    Psychiatric  Bipolar: No  Anxiety:Yes  Depression:No  History of alcohol/drug abuse: No  Hx of eating disorder:No    Endocrine  Personal or family hx of MTC or MEN2:No  Diabetes/prediabetes: Yes    Neurologic:  Hx of seizures: No  Hx of migraines: Yes  Memory Impairment: No      History of kidney stones: No  Kidney disease: No  Current birth control: Hysterectomy        Objective     /71 (BP Location: Left arm, Patient Position: Sitting, Cuff Size: Adult Large)   Pulse 86   Ht 1.575 m (5' 2\")   Wt 114.5 kg (252 lb 8 oz)   SpO2 98%   BMI 46.18 kg/m    Body mass index is 46.18 kg/m .  Physical Exam   GENERAL: Healthy, alert and no distress  EYES: Eyes grossly normal to inspection.  No discharge or erythema, or obvious scleral/conjunctival abnormalities.  RESP: No audible wheeze, cough, or visible cyanosis.  No visible retractions or increased work of breathing.   SKIN: Visible skin clear. No significant rash, abnormal pigmentation or lesions.  NEURO: Cranial nerves grossly intact.  Mentation and speech appropriate for age.  PSYCH: Mentation appears normal, affect normal/bright, judgement and insight intact, normal speech and appearance well-groomed.        In summary, Shira Best has Class III obesity with a body mass index of Body mass index is 46.18 kg/m . kg/m2 and the comorbidities stated above. She completed an informational seminar and is a possible candidate for the laparoscopic gastric bypass.  She will have to complete the following pre-requisites:    Received weight loss goal of 10 lb prior to surgery.  Achieve clearance from dietitian to see surgeon.  Have preoperative laboratory tests " drawn.  Psychological Evaluation with MMPI and clearance for weight loss surgery.  Letter of clearance from the following PCP, therapy, cardiology, hematology, pulmonology, neurology.    Today in the office we discussed gastric bypass surgery.  Preoperative, perioperative, and postoperative processes, management, and follow up were addressed.  Risks and benefits were outlined including the risk of death, staple line leak (1-2%), PE, DVT, ulcer, N/V, stricture, hernia, wound infection, weight regain, and vitamin deficiencies. I emphasized exercise and activity along with appropriate food choice as the main foundation for weight loss with surgery providing surgical reinforcement of this. All questions were answered.  A goal sheet and support group handout were given to the patient.          If you have not already watched our online seminar please go to [email or web address]    Weight loss requirement: 10lbs prior to surgery. Will have final weight check 2-3 weeks prior to surgery at anesthesia or nurse pre-op teaching visit.    -Need current weight confirmation at primary clinic or weight management clinic No    Bariatric labs ordered, call for a lab only appointment at any [facility] lab. To find a lab location near you, please call [phone]. Please let us know if orders need to be faxed to a non [facility] lab.    Schedule bariatric psych eval as soon as possible.  List of psychologists will be sent to you via [employer] or given to you in clinic.     Call Pepe Conway at [phone] to discuss insurance coverage for bariatric surgery.  Please check with your insurance regarding bariatric surgery coverage also. Pepe can also help you with scheduling psych eval if you are having difficulties.    The following clearance letters are needed: Letter templates will be sent to you via [employer] or given to you in clinic. Providers can submit through electronic medical record or fax to  [phone].  - primary care provider   - Therapist  - Cardiologist   - Hematology   - Pulmonology   - Neurology     Smoking cessation and nicotine test needed: No    Birth control after surgery discussed. Patient instructed that 2 forms of birth control required after surgery and to avoid pregnancy for at least 18 months after surgery: hysterectomy     NEXT VISITS: A  should reach out to you to schedule the following appointments.  If they do not reach you please call [phone] to schedule the following appointments:    -See dietitian in 1 month and monthly for 3 months    -See Lauren Bloch MTM pharmacist in 1 month to follow up on weight loss medications    -See Bhanu Toro in 2 months to follow up on pre-op weight loss and weight loss medications    -See Dr Higgins in 1 month for bariatric surgeon visit. Discuss bariatric surgery.  Important items to discuss RYBP and Emgality.       Once the patient has completed the requirements in their task list and there are no further recommendations, the pt will be allowed to see the surgeon of their choice for consultation on the laparoscopic gastric bypass surgery. Patient verbalizes understanding of the process to surgery and expectations for the postoperative period including the need for lifelong lifestyle changes, vitamin supplementation, and laboratory monitoring.    Sincerely,     BHANU TRONCOSO PA-C      Again, thank you for allowing me to participate in the care of your patient.      Sincerely,    BHANU TRONCOSO PA-C

## 2022-12-05 NOTE — Clinical Note
NBS, Dr. Higgins. She is specifically requesting a RYBP due to sister having a sleeve w/ complications. She is on Emgality, already discussed needing to be off it 6w pre-op and 12w post-op. She has had recent neuropsych evaluation that was normal, Dr. Cat said another is not needed, and regular psych eval will be fine.   Carol - I believe we already gave her everything in clinic yesterday. Does she need a nurse group visit?

## 2022-12-05 NOTE — PROGRESS NOTES
"75 minutes spent on the date of the encounter doing chart review, history and exam, documentation and further activities per the note    New Bariatric Surgery Consultation Note    2022    RE: Shira Best  MR#: [id number]  : 1965      Referring provider:       12/3/2022   Who referred you? Can't remember his name       Chief Complaint/Reason for visit: evaluation for possible weight loss surgery    Dear No primary care provider on file.,    I had the pleasure of seeing your patient, Shira Best, to evaluate her obesity and consider her for possible weight loss surgery. As you know, Shira Best is 57 year old.  She has a height of 5' 2\", a weight of 252 lbs 8 oz, and calculated Body mass index is 46.18 kg/m .    Assessment & Plan   Problem List Items Addressed This Visit        Digestive    Class 3 severe obesity with serious comorbidity and body mass index (BMI) of 45.0 to 49.9 in adult (H)     Obesity onset in childhood. Gradual weight gain through college and working nigh shifts. Weight loss of 108lbs previously, and was weight stable at 150lbs for around 3 years. Weight increased again due to ending of relationship and increase in injuries that have limited ability to exercise. Struggles most with cravings and increase portion sizes. History of Stage 2 COPD, HTN, HLD, GERD, Fibromyalgia, migraines, CAD, concussions, and OA.     Patient specifically would like to go through with RYBP because sister had gastric sleeve and has had extensive complications.     Currently taking Emgality for migraines. Discussed needing to stop 6 weeks prior to surgery and 12 weeks post surgery. She is reach out to Neurology for migraine management during that time.     Fibromyalgia is currently controled with tylenol and opioids. She has seen pain clinic in the past, but did not find it to be helpful. PCP currently controls pain medications. She has had previous surgeries that were well " controlled with opioids and tylenol. Discuss later if pain management is needed.     Discussed AOms:   - Phentermine - contraindicated due to history of heart disease and current pace maker/defibulator placement.   - Topiramte - currently on for migraines   - Naltrexone - contraindicated due to currently taking opioids as needed for fibromyalgia pain.   - GLP-1 to be started today to help with hunger and pre-diabetes. Ozempic if covered. Consider Saxenda if not. Side effects and benefits discussed. All questions answered.          Relevant Medications    metFORMIN (GLUCOPHAGE XR) 500 MG 24 hr tablet    semaglutide (OZEMPIC) 2 MG/1.5ML SOPN pen    Other Relevant Orders    CBC with platelets (Completed)    Hemoglobin A1c (Completed)    Comprehensive metabolic panel (Completed)    Lipid panel reflex to direct LDL Fasting (Completed)    Vitamin D Deficiency (Completed)    Parathyroid Hormone Intact (Completed)    Med Therapy Management Referral       Endocrine    Pre-diabetes    Relevant Medications    semaglutide (OZEMPIC) 2 MG/1.5ML SOPN pen    Other Relevant Orders    Med Therapy Management Referral   Other Visit Diagnoses     History of pulmonary embolism    -  Primary    Relevant Orders    Adult Oncology/Hematology  Referral    Adult E-Consult to Hematology (Outpt Provider to Specialist Written Question & Response)         1. Labs ordered   2. Start Ozempic 0.25mg for four weeks, then increase to 0.5mg for four weeks. If not covered will consider Saxenda.   3. Schedule Psych Eval   4. Letters of support/Clearance:   - PCP  - Therapy   - Cardiology   - Pulmonology   - Neurology   - Hematology - referral placed   5. Dietitian 3x, and then monthly until surgery   6. Dr. Higgins in 1 month   7. MTM Pharmacist in 1 month for GLP-1 start and polypharmacy pre-surg   8. Follow up with Neyda Toro in 2 months       HISTORY OF PRESENT ILLNESS:  Weight Loss History Reviewed with Patient 12/3/2022   How long have you  been overweight? Since early childhood   What is the most that you have ever weighed? 278   What is the most weight you have lost? 108   I have tried the following methods to lose weight Watching portions or calories, Exercise   I have tried the following weight loss medications? (Check all that apply) Topamax/Topiramate, Metformin   Have you ever had weight loss surgery? No     Obesity onset in childhood. Weight gain has been gradual. Increased weight gain after college, started working at night and would eat late nights. Was able to lose 108lbs after her first heart attack in 2018 with low salt diet and decrease portion sizes. Lowest weight around 150lbs. Was able to sustain that weight for around 3 years. Weight gain after relationship ended and started having injuries. Would like to go through surgery because she is unable to lose weight like she had in the past. Has become harder to exercise.     Eating 2 meals a day, with snacks. Has been decreasing candy and chewing gum instead. Decreasing pop. Does not struggle with hunger. Eats when she is board. Working with therpist and well clinic to help with food. Can get full and stay full. Craves sugar. Eats out 1xmonth.   Brunch - apple, bagel, sandwich + fruit, leftovers  Dinner - cereal, ribs, potatoes, salad  Snacks - apple, crackers, fruit, hummus, candy   Drinks - flavored water, vitamin water, sugar free power aid, milk, diet coke (2cans a day)  ETOH - rarely    Activity - will get injured every time will start to work out. Feels very discouraged. Able to walk around without issues. Can walk 100yards until needs to rest.     [facility] - working with therapist and dietician. Has been going there since before summer 2022. Started on metformin    AOMs:   - Metformin - has been held off due to shoulder surgery. Restarting tomorrow. Helps with hunger control. Denies side effects   - Topiramate - for migraines. Does not help with hunger. Denies side  effects    CO-MORBIDITIES OF OBESITY INCLUDE:     12/3/2022   I have the following health issues associated with obesity: Pre-Diabetes, Heart Disease, High Blood Pressure, High Cholesterol, Polycystic Ovarian Syndrome, GERD (Reflux), Osteoarthritis (joint disease)     Pre-diabetes - A1C 5.8 7/19/2022.     CAD - followed by cardiology. Has pacemaker and defibrillator. Well controlled on medications. Most recent Echo 1/31/2022 - EF 60-65%, mild tricuspid regurg. No recent stress test     HLD - controlled with medications     HTN - controlled with medications. Does not check at home.     GERD - Well controlled with medications. Symptoms used to be regurg and burning in back of throat. Trigged by tomatoes and peanutbutter. EGD was normal     Fibromyalgia - Followed by PCP. Controlled with tylenol and opioids.     COPD -  Takes inhaler daily. Has had increased SOB due to recent weight gain. No recent exacerbations. Not on oxygen. Followed by pulmonology prior, now is followed by PCP.     Migraines -Emgality once a month and topiramate.           History of bleeding or clotting disorder?  3/14/2015 - bilateral pulmonary embolus, [facility] after placement of AICD     Is patient on biologics or immunomodulators? Emgality     PAST MEDICAL HISTORY:  No past medical history on file.    PAST SURGICAL HISTORY:  No past surgical history on file.  Left total hip replaced   Bilateral total knee replaced  Neuroma removal in toes  Carpal tunnel surgery bilateral   Left hand surgery   Right shoulder surgery   Hysterectomy   No other surgical history       FAMILY HISTORY:   No family history on file.    SOCIAL HISTORY:   Social History Questions Reviewed With Patient 12/3/2022   Which best describes your employment status (select all that apply) I work full-time, I work days   If you work, what is your occupation?    Which best describes your marital status: single   Do you have children? No   Who do you  have in your support network that can be available to help you for the first 2 weeks after surgery? Yes my family   Who can you count on for support throughout your weight loss surgery journey? My sister and niece my mom   Can you afford 3 meals a day?  No   Can you afford 50-60 dollars a month for vitamins? No   Works full time as a  for time. Lives at home to help take care of mother with altimerize.     HABITS:     12/3/2022   How often do you drink alcohol? Never   Have you ever used any of the following nicotine products? Cigarettes   If you previously used any of these products, what year did you quit? 2009   Have you or are you currently using street drugs or prescription strength medication for which you do not have a prescription for? No   Do you have a history of chemical dependency (alcohol or drug abuse)? No     Nicotine quit in 2009.       Currently taking narcotic/opioids Yes    PSYCHOLOGICAL HISTORY:   Psychological History Reviewed With Patient 12/3/2022   Have you ever attempted suicide? Never.   Have you had thoughts of suicide in the past year? No   Have you ever been hospitalized for mental illness or a suicide attempt? Never.   Do you have a history of chronic pain? Yes   Have you ever been diagnosed with fibromyalgia? Yes   Are you currently being treated for any of the following? (select all that apply) Anxiety   Are you currently seeing a therapist or counselor?  Yes   Are you currently seeing a psychiatrist? Yes     Anxiety - mood is well controled with medications. Followed by therapist. Primary care prescribes meds.     ROS:     12/3/2022   Skin:  Skin fold rashes (groin or other folds)   HEENT: Headaches, Missing teeth   If you answered yes to missing teeth, please indicate how many: 5   Musculoskeletal: Joint Pain, Back pain, Limited mobility, Swelling of legs, Arthritis   Cardiovascular: Chest pain, Shortness of breath with activity   Pulmonary: Shortness of breath with  activity, Snoring, Experience morning headaches   Gastrointestinal: Heartburn, Reflux   Genitourinary: Stress incontinence (losing urine when coughing, sneezing, etc.)   Hematological: None of the above   Neurological: Migraine headaches   Female only: Irregular menstrual cycles, Polycystic ovarian syndrome (PCOS), Post-menopausal, None of the above     Missing teeth with partial dentures - 7 missing, able to chew with no concerns.     EATING BEHAVIORS:     12/3/2022   Have you or anyone else thought that you had an eating disorder? No   Do you currently binge eat (eat a large amount of food in a short time)? No   Are you an emotional eater? Yes   Do you get up to eat after falling asleep? No       EXERCISE:     12/3/2022   How often do you exercise? 1 to 2 times per week   What is the duration of your exercise (in minutes)? 30 Minutes   What types of exercise do you do? home gym   What keeps you from being more active?  Pain, I have just had surgery on one or more of my joints       MEDICATIONS:  Current Outpatient Medications   Medication Sig Dispense Refill     acetaminophen (TYLENOL) 325 MG tablet Take 650 mg by mouth       albuterol (PROAIR HFA/PROVENTIL HFA/VENTOLIN HFA) 108 (90 Base) MCG/ACT inhaler Inhale 1-2 puffs into the lungs       aspirin (ASA) 81 MG chewable tablet 81 mg       Budeson-Glycopyrrol-Formoterol (BREZTRI AEROSPHERE) 160-9-4.8 MCG/ACT AERO Inhale 2 puffs into the lungs 2 times daily       busPIRone (BUSPAR) 10 MG tablet Take 10 mg by mouth 3 times daily       butalbital-acetaminophen-caffeine (ESGIC) -40 MG tablet Take 1 tablet by mouth as needed       famotidine (PEPCID) 40 MG tablet Take 1 tablet by mouth At Bedtime       furosemide (LASIX) 20 MG tablet Take 30 mg by mouth       gabapentin (NEURONTIN) 300 MG capsule Take 900 mg by mouth       galcanezumab-gnlm (EMGALITY) 120 MG/ML injection Inject 1 auto injector pen every month, rotate injection site locations each month. For  monthly maintenance dosing to prevent headaches/migraines.       magnesium 250 MG tablet Take 250 mg by mouth       metFORMIN (GLUCOPHAGE XR) 500 MG 24 hr tablet Take 500 mg by mouth       metoprolol succinate ER (TOPROL XL) 25 MG 24 hr tablet TAKE 1 & 1/2 (ONE & ONE-HALF) TABLETS BY MOUTH ONCE DAILY -  DO  NOT  CRUSH  OR  CHEW       oxyCODONE (ROXICODONE) 5 MG tablet        pantoprazole (PROTONIX) 40 MG EC tablet TAKE 1 TABLET BY MOUTH ONCE DAILY DO  NOT  CRUSH       potassium chloride ER (K-TAB/KLOR-CON) 10 MEQ CR tablet        prochlorperazine (COMPAZINE) 5 MG tablet Take 1-2 tablets by mouth at onset of migraine headache, limit 3 times per week.       rosuvastatin (CRESTOR) 40 MG tablet Take 1 tablet by mouth At Bedtime       semaglutide (OZEMPIC) 2 MG/1.5ML SOPN pen Inject 0.25 mg subcutaneously once weekly for 4 weeks then 0.5 mg once weekly. 3 mL 1     sotalol (BETAPACE) 80 MG tablet TAKE 1 & 1/2 (ONE & ONE-HALF) TABLETS BY MOUTH TWICE DAILY       topiramate (TOPAMAX) 100 MG tablet TAKE 1 TABLET BY MOUTH IN THE MORNING AND 1 AT NIGHT       traZODone (DESYREL) 150 MG tablet Take 150 mg by mouth At Bedtime       venlafaxine (EFFEXOR) 50 MG tablet Take 50 mg by mouth 2 times daily (with meals)       benzonatate (TESSALON) 200 MG capsule Take 200 mg by mouth as needed       nystatin (MYCOSTATIN) 450833 UNIT/GM external powder Apply 60 g topically as needed       riboflavin 100 MG CAPS Take 1 tablet by mouth 2 times daily         ALLERGIES:  Allergies   Allergen Reactions     Amitriptyline      Tongue swells up and gets little dots on tongue     Amoxicillin      Sore tongue (no problems with Amoxicillin)  ++ has tolerated cephalexin and cefazolin ++     Sulindac      Lip swelling  3-7-17: Has tolerated other NSAIDs           Anti-obesity medication ROS:    HEENT  Hx of glaucoma: No    Cardiovascular  CAD:Yes  HTN:Yes    Gastrointestinal  GERD:Yes  Constipation:No  Liver Dz:No  H/O  "Pancreatitis:No    Psychiatric  Bipolar: No  Anxiety:Yes  Depression:No  History of alcohol/drug abuse: No  Hx of eating disorder:No    Endocrine  Personal or family hx of MTC or MEN2:No  Diabetes/prediabetes: Yes    Neurologic:  Hx of seizures: No  Hx of migraines: Yes  Memory Impairment: No      History of kidney stones: No  Kidney disease: No  Current birth control: Hysterectomy        Objective    /71 (BP Location: Left arm, Patient Position: Sitting, Cuff Size: Adult Large)   Pulse 86   Ht 1.575 m (5' 2\")   Wt 114.5 kg (252 lb 8 oz)   SpO2 98%   BMI 46.18 kg/m    Body mass index is 46.18 kg/m .  Physical Exam   GENERAL: Healthy, alert and no distress  EYES: Eyes grossly normal to inspection.  No discharge or erythema, or obvious scleral/conjunctival abnormalities.  RESP: No audible wheeze, cough, or visible cyanosis.  No visible retractions or increased work of breathing.   SKIN: Visible skin clear. No significant rash, abnormal pigmentation or lesions.  NEURO: Cranial nerves grossly intact.  Mentation and speech appropriate for age.  PSYCH: Mentation appears normal, affect normal/bright, judgement and insight intact, normal speech and appearance well-groomed.        In summary, Shira Best has Class III obesity with a body mass index of Body mass index is 46.18 kg/m . kg/m2 and the comorbidities stated above. She completed an informational seminar and is a possible candidate for the laparoscopic gastric bypass.  She will have to complete the following pre-requisites:    Received weight loss goal of 10 lb prior to surgery.  Achieve clearance from dietitian to see surgeon.  Have preoperative laboratory tests drawn.  Psychological Evaluation with MMPI and clearance for weight loss surgery.  Letter of clearance from the following PCP, therapy, cardiology, hematology, pulmonology, neurology.    Today in the office we discussed gastric bypass surgery.  Preoperative, perioperative, and " postoperative processes, management, and follow up were addressed.  Risks and benefits were outlined including the risk of death, staple line leak (1-2%), PE, DVT, ulcer, N/V, stricture, hernia, wound infection, weight regain, and vitamin deficiencies. I emphasized exercise and activity along with appropriate food choice as the main foundation for weight loss with surgery providing surgical reinforcement of this. All questions were answered.  A goal sheet and support group handout were given to the patient.          If you have not already watched our online seminar please go to [email or web address]    Weight loss requirement: 10lbs prior to surgery. Will have final weight check 2-3 weeks prior to surgery at anesthesia or nurse pre-op teaching visit.    -Need current weight confirmation at primary clinic or weight management clinic No    Bariatric labs ordered, call for a lab only appointment at any [facility] lab. To find a lab location near you, please call [phone]. Please let us know if orders need to be faxed to a non [facility] lab.    Schedule bariatric psych eval as soon as possible.  List of psychologists will be sent to you via [employer] or given to you in clinic.     Call Pepe Conway at [phone] to discuss insurance coverage for bariatric surgery.  Please check with your insurance regarding bariatric surgery coverage also. Pepe can also help you with scheduling psych eval if you are having difficulties.    The following clearance letters are needed: Letter templates will be sent to you via [employer] or given to you in clinic. Providers can submit through electronic medical record or fax to [phone].  - primary care provider   - Therapist  - Cardiologist   - Hematology   - Pulmonology   - Neurology     Smoking cessation and nicotine test needed: No    Birth control after surgery discussed. Patient instructed that 2 forms of birth control required after surgery and to avoid  pregnancy for at least 18 months after surgery: hysterectomy     NEXT VISITS: A  should reach out to you to schedule the following appointments.  If they do not reach you please call [phone] to schedule the following appointments:    -See dietitian in 1 month and monthly for 3 months    -See Lauren Bloch MTM pharmacist in 1 month to follow up on weight loss medications    -See Bhanu Toro in 2 months to follow up on pre-op weight loss and weight loss medications    -See Dr Higgins in 1 month for bariatric surgeon visit. Discuss bariatric surgery.  Important items to discuss RYBP and Emgality.       Once the patient has completed the requirements in their task list and there are no further recommendations, the pt will be allowed to see the surgeon of their choice for consultation on the laparoscopic gastric bypass surgery. Patient verbalizes understanding of the process to surgery and expectations for the postoperative period including the need for lifelong lifestyle changes, vitamin supplementation, and laboratory monitoring.    Sincerely,     BHANU TRONCOSO PA-C

## 2022-12-06 ENCOUNTER — VIRTUAL VISIT (OUTPATIENT)
Dept: ENDOCRINOLOGY | Facility: CLINIC | Age: 57
End: 2022-12-06
Payer: COMMERCIAL

## 2022-12-06 DIAGNOSIS — Z71.3 NUTRITIONAL COUNSELING: Primary | ICD-10-CM

## 2022-12-06 DIAGNOSIS — E66.9 OBESITY: ICD-10-CM

## 2022-12-06 LAB — DEPRECATED CALCIDIOL+CALCIFEROL SERPL-MC: 32 UG/L (ref 20–75)

## 2022-12-06 PROCEDURE — 97802 MEDICAL NUTRITION INDIV IN: CPT | Mod: GT | Performed by: DIETITIAN, REGISTERED

## 2022-12-06 NOTE — PATIENT INSTRUCTIONS
"Robert Silva!    Follow-up with RD in 1 month    Thank you,    Ronda Ferreira, DELTA, LD  If you would like to schedule or reschedule an appointment with the RD, please call [phone]    Nutrition Goals  Relating To Eatin) Eat slowly (20-30 minutes per meal), chewing foods well (25 chews per bite/applesauce consistency)  2) 9\" Plate method (1/2 plate non-starchy vegetables/fruit, 1/4 plate lean protein, 1/4 plate whole grain starch - no more than 1/2 cup carb/meal)    Relating to beverages:  1) Separate fluids from meals and for 30 minutes after    Your Stage 1 Diet: Clear Liquids  [email or web address]     Your Stage 2 Diet: Low-fat Full Liquids  [email or web address]     Your Stage 3 Diet: Pureed Foods  [email or web address]     Pureed Pleasures  [email or web address]    Your Stage 4 Diet: Soft Foods  [email or web address]    Your Stage 5 Diet: Regular Foods  [email or web address]    Supplements after Weight Loss Surgery  [email or web address]     The Plate Method  [email or web address]    Protein Sources for Weight Loss  [email or web address]     Carbohydrates  [email or web address]     Mindful Eating  [email or web address]     Summary of Volumetrics Eating Plan  [email or web address]     Diet Guidelines after Weight Loss Surgery  [email or web address]     Seated Exercises for Arms and Legs (can be done before or after surgery)  [email or web address]    Interested in working with a health ? Health coaches work with you to improve your overall health and wellbeing. They look at the whole person, and may involve discussion of different areas of life, including, but not limited to the four pillars of health (sleep, exercise, nutrition, and stress management). Discuss with your care team if you would like to start working a health .    Health Coaching-3 Pack:    $99 for three health " "coaching visits    Visits may be done in person or via phone    Coaching is a partnership between the  and the client; Coaches do not prescribe or diagnose    Coaching helps inspire the client to reach his/her personal goals    COMPREHENSIVE WEIGHT MANAGEMENT PROGRAM  VIRTUAL SUPPORT GROUPS    For Support Group Information:      We offer support groups for patients who are working on weight loss and considering, preparing for or have had weight loss surgery.   There is no cost for this opportunity.  You are invited to attend the?Virtual Support Groups?provided by any of the following locations:    [facility] via Microsoft Teams with Gemini Epperson RN  2.   [place] via Anomo with Daren Kline, PhD, LP  3.   [place] via Anomo with Georgia Abrams RN  4.   [facility] via Microsoft Teams with Georgia Cosby [facility]    The following Support Group information can also be found on our website:  [email or web address]      [facility] Weight Loss Surgery Support Group    [facility] Weight Loss Surgery Support Group  The support group is a patient-lead forum that meets monthly to share experiences, encouragement and education. It is open to those who have had weight loss surgery, are scheduled for surgery, and those who are considering surgery.   WHEN: This group meets on the 3rd Wednesday of each month from 5:00PM - 6:00PM virtually using Microsoft Teams.   FACILITATOR: Led by Gemini Epperson RD, LD, RN, the program's Clinical Coordinator.   TO REGISTER: Please contact the clinic via [employer] or call the nurse line directly at [phone] to inform our staff that you would like an invite sent to you and to let us know the email you would like the invite sent to. Prior to the meeting, a link with directions on how to join the meeting will be sent to you.    2022 Meetings  January 19: \"Let's Talk\" a time for the " "group to share.  February 16: \"Let's Talk\" a time for the group to share.  March 16: Guest Speakers: Psychologists, Keila Saba, PhD,LP and Kaycee Aaron, Mina,  April 20: Guest Speaker: Health , Awilda Flores, CHC,CHES, CPT  May 18: Guest Speaker: Dietitian, Orlando Lynne, RD, LP  Ivelisse 15: \"Let's Talk\" a time for the group to share.  July 20: \"Let's Talk\" a time for the group to share.  August 17: TBA  September 21: TBA  October 19: Guest Speaker: Dr Gianluca Martinez MD Pulmonologist and Sleep Medicine Physician, \"Getting a Good Night's Sleep\".  November 16: TBA  December 21: TBA    [facility] Support Groups    Connections: Bariatric Care Support Group?  This is open to all [facility] (and those external to this program) pre- and post- operative bariatric surgery patients as well as their support system.   WHEN: This group meets the 2nd Tuesday of each month from 6:30 PM - 8:00 PM virtually using Microsoft Teams.   FACILITATOR: Led by Daren Kline, Ph.D who is a Licensed Psychologist with the [facility].   TO REGISTER: Please send an email to Daren Kline, Ph.D.,  at?[email or web address]?if you would like an invitation to the group and to learn about using Microsoft Teams.    2022 Meetings  January 11: Susannah Delgado, PharmD, Pharmacy Resident at [facility], \"Medications and Bariatric Surgery\".  February 8: Open Forum  March 8  April 12  May 10  Ivelisse 14    Connections: Post-Operative Bariatric Surgery Support Group  This is a support group for [facility] bariatric patients (and those external to [facility]) who have had bariatric surgery and are at least 3 months post-surgery.  WHEN: This support group meets the 4th Wednesday of the month from 11:00 AM - 12:00 PM virtually using Microsoft Teams.   FACILITATOR: Led by Certified Bariatric Nurse, Georgia Abrams RN.   TO REGISTER: Please send an " "email to Georgia at [email or web address] if you would like an invitation to the group and to learn about using Microsoft Teams.    2022 Meetings  January 26  February 23  March 23  April 27  May 25  Ivelisse 22    [facility] Healthy Lifestyle Virtual Support Group    Healthy Lifestyle Virtual Support Group?  This is 60 minutes of small group guided discussion, support and resources. All are welcome who want a healthy lifestyle.  WHEN: This group meets monthly on a Friday from 12:30 PM - 1:30 PM virtually using Microsoft Teams.   FACILITATOR: Led by National Board Certified Health and , Georgia Cosby [facility].   TO REGISTER: Please send an email to Georgia [email or web address] to receive monthly invites to the group or if you have any questions about having a health .  Prior to the meeting, a link with directions on how to join the meeting will be sent to you.    2022 Meetings  January 21: Tracey Chaidez MS, RN, CIC, CBN, \"Healthy Habits\"  February 25: Open Forum  March 18: \"Setting Limits and Boundaries\"  April 29: Laura Trinidad RD, \"Meal Planning Made Easy\"  May 20: Open Forum  June: To be determined                "

## 2022-12-06 NOTE — ASSESSMENT & PLAN NOTE
Obesity onset in childhood. Gradual weight gain through college and working [employer] shifts. Weight loss of 108lbs previously, and was weight stable at 150lbs for around 3 years. Weight increased again due to ending of relationship and increase in injuries that have limited ability to exercise. Struggles most with cravings and increase portion sizes. History of Stage 2 COPD, HTN, HLD, GERD, Fibromyalgia, migraines, CAD, concussions, and OA.     Patient specifically would like to go through with RYBP because sister had gastric sleeve and has had extensive complications.     Currently taking Emgality for migraines. Discussed needing to stop 6 weeks prior to surgery and 12 weeks post surgery. She is reach out to Neurology for migraine management during that time.     Fibromyalgia is currently controled with tylenol and opioids. She has seen pain clinic in the past, but did not find it to be helpful. PCP currently controls pain medications. She has had previous surgeries that were well controlled with opioids and tylenol. Discuss later if pain management is needed.     Discussed AOms:   - Phentermine - contraindicated due to history of heart disease and current pace maker/defibulator placement.   - Topiramte - currently on for migraines   - Naltrexone - contraindicated due to currently taking opioids as needed for fibromyalgia pain.   - GLP-1 to be started today to help with hunger and pre-diabetes. Ozempic if covered. Consider Saxenda if not. Side effects and benefits discussed. All questions answered.

## 2022-12-06 NOTE — LETTER
"12/6/2022       RE: Shira Best  [address] [address]     Dear Colleague,    Thank you for referring your patient, Shira Best, to the [facility] WEIGHT MANAGEMENT [facility]IN[facility] [place] at [facility]. Please see a copy of my visit note below.    Shira Best is a 57 year old female who is being evaluated via a billable video visit.      The patient has been notified of following:     \"This video visit will be conducted via a call between you and your physician/provider. We have found that certain health care needs can be provided without the need for an in-person physical exam.  This service lets us provide the care you need with a video conversation.  If a prescription is necessary we can send it directly to your pharmacy.  If lab work is needed we can place an order for that and you can then stop by our lab to have the test done at a later time.    Video visits are billed at different rates depending on your insurance coverage.  Please reach out to your insurance provider with any questions.    If during the course of the call the physician/provider feels a video visit is not appropriate, you will not be charged for this service.\"    Patient has given verbal consent for Video visit? Yes  How would you like to obtain your AVS? MyChart  If you are dropped from the video visit, the video invite should be resent to: Text to cell phone: [phone]  Will anyone else be joining your video visit? No  {If patient encounters technical issues they should call [phone]      Video-Visit Details    Type of service:  Video Visit    Video Start Time: 10:00 AM  Video End Time: 10:25 AM    Originating Location (pt. Location): Home    Distant Location (provider location):  Offsite (providers home)    Platform used for Video Visit: [employer]    During this virtual visit the patient is located in [place], patient verifies this as the location " "during the entirety of this visit.       New Bariatric Nutrition Consultation Note    Reason For Visit: Nutrition Assessment    Shira Best is a 57 year old presenting today for new bariatric nutrition consult.   Pt is interested in laparoscopic sleeve gastrectomy with Dr. Higgins expected surgery in TBD.  Patient is accompanied by self.  This is pt's first of 3 required nutrition visits prior to surgery.     Pt referred by CONCHIS Brandon on December 6, 2022.  CO-MORBIDITIES OF OBESITY INCLUDE:       12/3/2022   I have the following health issues associated with obesity: Pre-Diabetes, Heart Disease, High Blood Pressure, High Cholesterol, Polycystic Ovarian Syndrome, GERD (Reflux), Osteoarthritis (joint disease)       SUPPORT:  Support System Reviewed With Patient 12/3/2022   Who do you have in your support network that can be available to help you for the first 2 weeks after surgery? Yes my family   Who can you count on for support throughout your weight loss surgery journey? My sister and niece my mom       ANTHROPOMETRICS:  Estimated body mass index is 46.18 kg/m  as calculated from the following:    Height as of 12/5/22: 1.575 m (5' 2\").    Weight as of 12/5/22: 114.5 kg (252 lb 8 oz).    Required weight loss goal pre-op: -10 lbs from initial consult weight (goal weight TBD lbs or less before surgery)       12/3/2022   I have tried the following methods to lose weight Watching portions or calories, Exercise       Weight Loss Questions Reviewed With Patient 12/3/2022   How long have you been overweight? Since early childhood       SUPPLEMENT INFORMATION:  none    NUTRITION HISTORY:  Per CONCHIS Tierney: Would like to go through surgery because she is unable to lose weight like she had in the past. Has become harder to exercise    Working with [facility] clinic RD- working on portion sizes and decreasing carb portions.     Barriers to lifestyle change: Difficulty exercising, previously failed weight loss " attempts     Recall Diet Questions Reviewed With Patient 12/3/2022   Describe what you typically consume for breakfast (typical or most recent): Bagles, yogurt apple   Describe what you typically consume for lunch (typical or most recent): Darragh, water flavored, bagel apple.   Describe what you typically consume for supper (typical or most recent): Piece of meat, potato, veggie milk   Describe what you typically consume as snacks (typical or most recent): Chips, crackers ,apples, candy, pop, sweetbread,.   How many ounces of water, or other low calorie drinks, do you drink daily (8 oz=1 glass)? 48 oz   How many ounces of caffeine (coffee, tea, pop) do you drink daily (8 oz=1 glass)? 24 oz   How many ounces of carbonated (pop, beer, sparkling water) drinks do you drinky daily (8 oz=1 glass)? 32 oz   How many ounces of juice, pop, sweet tea, sports drinks, protein drinks, other sweetened drinks, do you drink daily (8 oz=1 glass)? 32 oz   How many ounces of milk do you drink daily (8 oz=1 glass) 8 oz   Please indicate the type of milk: 1%   How often do you drink alcohol? Never       Eating Habits 12/3/2022   Do you have any dietary restrictions? Yes   Do you currently binge eat (eat a large amount of food in a short time)? No   Are you an emotional eater? Yes   Do you get up to eat after falling asleep? No   What foods do you crave? Chocolate salads fruits       Dining Out History Reviewed With Patient 12/3/2022   How often do you dine out? Rarely.   Where do you dine out? (select all that apply) sit-down restaurants   What types of food do you order when you dine out? Pizza, steak, Chinese         EXERCISE:       12/3/2022   How often do you exercise? 1 to 2 times per week   What is the duration of your exercise (in minutes)? 30 Minutes   What types of exercise do you do? home gym   What keeps you from being more active?  Pain, I have just had surgery on one or more of my joints       NUTRITION DIAGNOSIS:  Obesity  "r/t long history of positive energy balance aeb BMI >30 kg/m2.    INTERVENTION:  Intervention Provided/Education Provided on post-op diet guidelines, vitamins/minerals essential post-operatively, GI anatomy of bariatric surgeries, ways to help prepare for post-op diet guidelines pre-operatively, portion/calorie-control, mindful eating and sources of protein.  Patient demonstrates understanding.     Personal barriers to making and continuing required life changes have been identified, and strategies to overcome those barriers have been recommended AND family and social supports have been assessed and strategies to strengthen those supports have been recommended.    Provided pt with list of goals, RD contact information and resources listed below via [employer].         Questions Reviewed With Patient 12/3/2022   How ready are you to make changes regarding your weight? Number 1 = Not ready at all to make changes up to 10 = very ready. 10   How confident are you that you can change? 1 = Not confident that you will be successful making changes up to 10 = very confident. 10       Expected Engagement: good    GOALS:  Relating To Eatin) Eat slowly (20-30 minutes per meal), chewing foods well (25 chews per bite/applesauce consistency)  2) 9\" Plate method (1/2 plate non-starchy vegetables/fruit, 1/4 plate lean protein, 1/4 plate whole grain starch - no more than 1/2 cup carb/meal)    Relating to beverages:  1) Separate fluids from meals and for 30 minutes after    Your Stage 1 Diet: Clear Liquids  [email or web address]     Your Stage 2 Diet: Low-fat Full Liquids  [email or web address]     Your Stage 3 Diet: Pureed Foods  [email or web address]     Pureed Pleasures  [email or web address]    Your Stage 4 Diet: Soft Foods  [email or web address]    Your Stage 5 Diet: Regular Foods  [email or web address]    Supplements after Weight Loss Surgery  [email or web address] "     The Plate Method  [email or web address]    Protein Sources for Weight Loss  [email or web address]     Carbohydrates  [email or web address]     Mindful Eating  [email or web address]     Summary of Volumetrics Eating Plan  [email or web address]     Diet Guidelines after Weight Loss Surgery  [email or web address]     Seated Exercises for Arms and Legs (can be done before or after surgery)  [email or web address]    Follow up: 1 month, prn    Time spent with patient: 25 minutes.  KALYANI BOATENG RD, LD          Again, thank you for allowing me to participate in the care of your patient.      Sincerely,    KALYANI BOATENG RD

## 2022-12-06 NOTE — PROGRESS NOTES
"Shira Best is a 57 year old female who is being evaluated via a billable video visit.      The patient has been notified of following:     \"This video visit will be conducted via a call between you and your physician/provider. We have found that certain health care needs can be provided without the need for an in-person physical exam.  This service lets us provide the care you need with a video conversation.  If a prescription is necessary we can send it directly to your pharmacy.  If lab work is needed we can place an order for that and you can then stop by our lab to have the test done at a later time.    Video visits are billed at different rates depending on your insurance coverage.  Please reach out to your insurance provider with any questions.    If during the course of the call the physician/provider feels a video visit is not appropriate, you will not be charged for this service.\"    Patient has given verbal consent for Video visit? Yes  How would you like to obtain your AVS? MyChart  If you are dropped from the video visit, the video invite should be resent to: Text to cell phone: [phone]  Will anyone else be joining your video visit? No  {If patient encounters technical issues they should call [phone]      Video-Visit Details    Type of service:  Video Visit    Video Start Time: 10:00 AM  Video End Time: 10:25 AM    Originating Location (pt. Location): Home    Distant Location (provider location):  Offsite (providers home)    Platform used for Video Visit: Hansoft    During this virtual visit the patient is located in [place], patient verifies this as the location during the entirety of this visit.       New Bariatric Nutrition Consultation Note    Reason For Visit: Nutrition Assessment    Shira Best is a 57 year old presenting today for new bariatric nutrition consult.   Pt is interested in laparoscopic sleeve gastrectomy with Dr. Higgins expected surgery in [facility].  Patient is " "accompanied by self.  This is pt's first of 3 required nutrition visits prior to surgery.     Pt referred by CONCHIS Brandon on December 6, 2022.  CO-MORBIDITIES OF OBESITY INCLUDE:       12/3/2022   I have the following health issues associated with obesity: Pre-Diabetes, Heart Disease, High Blood Pressure, High Cholesterol, Polycystic Ovarian Syndrome, GERD (Reflux), Osteoarthritis (joint disease)       SUPPORT:  Support System Reviewed With Patient 12/3/2022   Who do you have in your support network that can be available to help you for the first 2 weeks after surgery? Yes my family   Who can you count on for support throughout your weight loss surgery journey? My sister and niece my mom       ANTHROPOMETRICS:  Estimated body mass index is 46.18 kg/m  as calculated from the following:    Height as of 12/5/22: 1.575 m (5' 2\").    Weight as of 12/5/22: 114.5 kg (252 lb 8 oz).    Required weight loss goal pre-op: -10 lbs from initial consult weight (goal weight TBD lbs or less before surgery)       12/3/2022   I have tried the following methods to lose weight Watching portions or calories, Exercise       Weight Loss Questions Reviewed With Patient 12/3/2022   How long have you been overweight? Since early childhood       SUPPLEMENT INFORMATION:  none    NUTRITION HISTORY:  Per CONCHIS Tierney: Would like to go through surgery because she is unable to lose weight like she had in the past. Has become harder to exercise    Working with [facility] clinic RD- working on portion sizes and decreasing carb portions.     Barriers to lifestyle change: Difficulty exercising, previously failed weight loss attempts     Recall Diet Questions Reviewed With Patient 12/3/2022   Describe what you typically consume for breakfast (typical or most recent): Bagles, yogurt apple   Describe what you typically consume for lunch (typical or most recent): [place], water flavored, bagel apple.   Describe what you typically consume for " supper (typical or most recent): Piece of meat, potato, veggie milk   Describe what you typically consume as snacks (typical or most recent): Chips, crackers ,apples, candy, pop, sweetbread,.   How many ounces of water, or other low calorie drinks, do you drink daily (8 oz=1 glass)? 48 oz   How many ounces of caffeine (coffee, tea, pop) do you drink daily (8 oz=1 glass)? 24 oz   How many ounces of carbonated (pop, beer, sparkling water) drinks do you drinky daily (8 oz=1 glass)? 32 oz   How many ounces of juice, pop, sweet tea, sports drinks, protein drinks, other sweetened drinks, do you drink daily (8 oz=1 glass)? 32 oz   How many ounces of milk do you drink daily (8 oz=1 glass) 8 oz   Please indicate the type of milk: 1%   How often do you drink alcohol? Never       Eating Habits 12/3/2022   Do you have any dietary restrictions? Yes   Do you currently binge eat (eat a large amount of food in a short time)? No   Are you an emotional eater? Yes   Do you get up to eat after falling asleep? No   What foods do you crave? Chocolate salads fruits       Dining Out History Reviewed With Patient 12/3/2022   How often do you dine out? Rarely.   Where do you dine out? (select all that apply) sit-down restaurants   What types of food do you order when you dine out? tenzin Vizcarra, Chinese         EXERCISE:       12/3/2022   How often do you exercise? 1 to 2 times per week   What is the duration of your exercise (in minutes)? 30 Minutes   What types of exercise do you do? home gym   What keeps you from being more active?  Pain, I have just had surgery on one or more of my joints       NUTRITION DIAGNOSIS:  Obesity r/t long history of positive energy balance aeb BMI >30 kg/m2.    INTERVENTION:  Intervention Provided/Education Provided on post-op diet guidelines, vitamins/minerals essential post-operatively, GI anatomy of bariatric surgeries, ways to help prepare for post-op diet guidelines pre-operatively,  "portion/calorie-control, mindful eating and sources of protein.  Patient demonstrates understanding.     Personal barriers to making and continuing required life changes have been identified, and strategies to overcome those barriers have been recommended AND family and social supports have been assessed and strategies to strengthen those supports have been recommended.    Provided pt with list of goals, RD contact information and resources listed below via [employer].         Questions Reviewed With Patient 12/3/2022   How ready are you to make changes regarding your weight? Number 1 = Not ready at all to make changes up to 10 = very ready. 10   How confident are you that you can change? 1 = Not confident that you will be successful making changes up to 10 = very confident. 10       Expected Engagement: good    GOALS:  Relating To Eatin) Eat slowly (20-30 minutes per meal), chewing foods well (25 chews per bite/applesauce consistency)  2) 9\" Plate method (1/2 plate non-starchy vegetables/fruit, 1/4 plate lean protein, 1/4 plate whole grain starch - no more than 1/2 cup carb/meal)    Relating to beverages:  1) Separate fluids from meals and for 30 minutes after    Your Stage 1 Diet: Clear Liquids  [email or web address]     Your Stage 2 Diet: Low-fat Full Liquids  [email or web address]     Your Stage 3 Diet: Pureed Foods  [email or web address]     Pureed Pleasures  [email or web address]    Your Stage 4 Diet: Soft Foods  [email or web address]    Your Stage 5 Diet: Regular Foods  [email or web address]    Supplements after Weight Loss Surgery  [email or web address]     The Plate Method  [email or web address]    Protein Sources for Weight Loss  [email or web address]     Carbohydrates  [email or web address]     Mindful Eating  [email or web address]     Summary of Volumetrics Eating Plan  [email or web address]     Diet " Guidelines after Weight Loss Surgery  [email or web address]     Seated Exercises for Arms and Legs (can be done before or after surgery)  [email or web address]    Follow up: 1 month, prn    Time spent with patient: 25 minutes.  KALYANI BOATENG RD, LD

## 2022-12-07 ENCOUNTER — CARE COORDINATION (OUTPATIENT)
Dept: ENDOCRINOLOGY | Facility: CLINIC | Age: 57
End: 2022-12-07

## 2022-12-07 NOTE — PROGRESS NOTES
Bariatric Task List updated.  Bariatric information/clearance letters sent to patient via [employer].    Bariatric Task List    Fax:  Please fax all paperwork to: [phone] -     Status:  Is patient a candidate for bariatric surgery?:  patient is a candidate for bariatric surgery -     Cleared to schedule surgeon consult?:  cleared to schedule surgeon consult -     Status:  surgery evaluation in process -     Surgeon: Dr. Higgins -     Tentative surgery month/year: TBD -        Insurance: Insurance:  [employer] -      Contact insurance to discuss coverage: Needed -       Cigna: PCP Recommendation and Medical Clearance:    -     HP Referral:    -      Advanced beneficiary notification (ABN) for Medicare patients for RD visits   and surgery:   -      Weight history:   -     Other:    -        Patient Info: Initial Weight:  252 -     Date of Initial Weight/Height:  12/5/2022 -     Goal Weight (lbs):  242 -     Required Weight Loss:  10 -     Surgery Type:  undecided -     Multidisciplinary Meeting:    -        Dietician Visits: Structured weight loss required by insurance?:    -     Dietician Visit 1:  Needed - 12/6/22, Ronda Tierney [place]   Dietician Visit 2:  Needed - 1/17/23, Ronda Tierney [place]   Dietician Visit 3:  Needed -     Dietician Visit 4:    -     Dietician Visit 5:    -     Dietician Visit 6:    -     Dietician Visit additional:    - Monthly until surgery - [place]   Clearance from dietician to see surgeon?:    -     Dietician Notes:    -        Psychological Evaluation: Psych eval:  Needed - List and letter sent to patient, 12/7/22, [place]   Therapist letter of support:  Needed - Letter sent to patient, 12/7/22, [place]   Psychiatrist letter of support:    -     Establish care with therapist:    -     Complete eating disorder evaluation:    -     Letter of clearance from therapist/eating disorder program:    -     Other:    -        Lab Work: Complete Blood Count:  Needed -    "  Comprehensive Metabolic Panel:  Needed -     Vitamin D:  Needed -     PTH:  Needed -     Hgb A1c:  Needed -      Lipids: Needed -      TSH ([facility], [facility], [place]):   -       Ferritin:   -       Folate:   -       Testosterone, Total and Free:   -     Thiamine:   -     Vitamin A:   -     Vitamin B12:   -     Zinc:   -     C-peptide:   -     H. pylori:    -     MRSA (2 swabs, minimum 48 hours apart):   -     Nicotine Testing:    -     Recheck Vitamin D:   -     Other:    -        Consults/ Clearance Sleep Medicine:    -     Cardiac:  Needed - Letter sent to patient, 12/7/22, [place]   Pain:   - Possibly, to eval at next visit   Dental:    -     Endocrine:    -     Gastroenterology:    -     Vascular Medicine:    -     Hematology:  Needed - econlt per heme, Letter sent to patient, 12/7/22, [place]   Medical Weight Management:   -     Physical Therapy/Exercise:    -     Nephrology:    -     Neurology:  Needed - Letter sent to patient, 12/7/22, [place]   Pulmonology:  Needed - Letter sent to patient, 12/7/22, [place]   Rheumatology:    -     Other:    -     Other:    -     Other:    -        Testing: UGI:    -     EGD:    -     Sleep Study:   -     Other:   -     Other:    -        PCP: Establish care with PCP:  Completed -     Follow up with PCP:    -     PCP letter of support:  Needed - Letter sent to patient, 12/7/22, [place]      Stopping Smoking/ Alcohol Use: Quit tobacco use (3 months smoke free)?:    -     Quit date:    -     Quit alcohol use:   -     Quit date:   -     Other:   -     Quit date:   -        Patient Education:  Information Session:  Needed -     Given \"Making your decision\" handout?:  Yes -     Given \"A Roadmap to you Weight Loss Surgery\" handout?: Yes -     Given \"Get Well Loop\" information?: Yes -     Given support group information?:    -     Attended support group?:  Needed -     Support plan in place?:    -     Research consents signed?:    -     Avoid NSAIDS/ Alternate Plan for Pain:   -        Additional Surgery " Requirements: Review Coag plan:    -     HgA1c <8:    -     Inpatient pain consult:    -     Final nicotine screen:    -     Dental work complete:    -     Birth control plan:    -     Gallstone prevention plan (Actigall for 6 months postop):   -     Other:   -     Other:   -        Final Tasks:  Before surgery online class:  Needed -     Before surgery online class website link:  [email or web address]   After surgery online class:  Needed -     After surgery online class website link:  [email or web address]   Nurse visit per clinic:  Needed -     History and Physical per clinic:   -     Final labs per clinic: Needed -     Chest xray per clinic:   -     Electrocardiogram (ECG) per clinic:   -     Other:   -        Notes:   -

## 2022-12-13 ENCOUNTER — TELEPHONE (OUTPATIENT)
Dept: NEUROPSYCHOLOGY | Facility: CLINIC | Age: 57
End: 2022-12-13

## 2022-12-13 ENCOUNTER — VIRTUAL VISIT (OUTPATIENT)
Dept: PHARMACY | Facility: CLINIC | Age: 57
End: 2022-12-13
Payer: COMMERCIAL

## 2022-12-13 DIAGNOSIS — E66.01 CLASS 3 SEVERE OBESITY WITH SERIOUS COMORBIDITY AND BODY MASS INDEX (BMI) OF 45.0 TO 49.9 IN ADULT, UNSPECIFIED OBESITY TYPE (H): Primary | ICD-10-CM

## 2022-12-13 DIAGNOSIS — E66.813 CLASS 3 SEVERE OBESITY WITH SERIOUS COMORBIDITY AND BODY MASS INDEX (BMI) OF 45.0 TO 49.9 IN ADULT, UNSPECIFIED OBESITY TYPE (H): Primary | ICD-10-CM

## 2022-12-13 PROCEDURE — 99207 PR NO CHARGE LOS: CPT | Performed by: PHARMACIST

## 2022-12-13 NOTE — Clinical Note
Patient pays out-of-pocket for Pharm D visit.  Educated on how to use Ozempic today, though PA not approved yet.  Patient will reach out if other medication needed and we can do an education visit at that time.  Ceci

## 2022-12-13 NOTE — PROGRESS NOTES
Clinical Pharmacy Consult:                                                    Shira Best is a 57 year old female called for a clinical pharmacist consult.  She was referred to me from Neyda Campos PA-C. Insurance does not cover comprehensive medication review with Medication Therapy Management (MTM). Patient declines private pay. Therefore, completed one time consult today.        Reason for Consult: Weight Management - GLP1 Start    Discussion:   Medication reconciliation completed.    Patient has been on topiramate for migraine prophylaxis.  Has not noticed an improvement in craving control or weight loss with this.    PA for Ozempic not approved yet. Education provided today. Patient to follow up if alternative medication and/or education needed.    Completed teaching of administration of Ozempic. Discussed mechanism of action, side effects, warnings, and efficacy.  Patient declines personal or family history of medullary thyroid carcinoma and multiple endocrine neoplasia syndrome type II.  No personal history of pancreatitis.  Discussed appropriate storage and stability. Answered patient questions.       Plan:  1.  Start Ozempic 0.25 mg weekly for 4 weeks.  May increase to 0.5 mg on week 5 if tolerating.  2.  Try Tylenol up to 1000 mg for breakthrough migraine.  Avoid butalbital/acetaminophen/caffeine tablets if possible.  3.  Discussed venlafaxine (Effexor) extended release options with your doctor.      Ceci Aj, DawsonD, MPH  Medication Therapy Management Pharmacist  [facility]    Secure Voicemail: [phone]

## 2022-12-13 NOTE — TELEPHONE ENCOUNTER
Called patient to schedule pre-bariatric surgery psychological evaluation with Dr. Cat. Patient lives in [place] and Dr. Cat is licensed until 3/30/2023.   Scheduled video visit 02/20/2023 at 8:30 a.m., to plan on 2 hours for interview followed by MMPI testing.

## 2022-12-13 NOTE — PATIENT INSTRUCTIONS
"Recommendations from today's MTM visit:                                                         1.  Start Ozempic 0.25 mg weekly for 4 weeks.  May increase to 0.5 mg on week 5 if tolerating.    Instructions on how to inject Ozempic (including a video):  https://www.ozempic.com/how-to-take/ozempic-dosing.html    2.  Try Tylenol up to 1000 mg for breakthrough migraine.  Avoid butalbital/acetaminophen/caffeine tablets if possible.  3.  Discussed venlafaxine (Effexor) extended release options with your doctor.    Follow-up: With Dr. Higgins in about 4 weeks.  As needed with pharmacist -your insurance does not cover pharmacist visits, so any future visits would be charged out-of-pocket for you.  Today's visit was a consult and not charge to you. Call [phone] to schedule.     It was great speaking with you today.  I value your experience and would be very thankful for your time in providing feedback in our clinic survey. In the next few days, you may receive an email or text message from [employer] with a link to a survey related to your  clinical pharmacist.\"     My Clinical Pharmacist's contact information:                                                      Please feel free to contact me with any questions or concerns you have.      Ceci Aj, DawsonD, MPH  Medication Therapy Management Pharmacist (temporary)  [facility]    Secure Voicemail: [phone]    Lauren Bloch, PharmD  Medication Therapy Management Pharmacist (permanent)  [facility] [place]              "

## 2022-12-14 ENCOUNTER — E-CONSULT (OUTPATIENT)
Dept: HEMATOLOGY | Facility: CLINIC | Age: 57
End: 2022-12-14
Payer: COMMERCIAL

## 2022-12-14 ENCOUNTER — TELEPHONE (OUTPATIENT)
Dept: ENDOCRINOLOGY | Facility: CLINIC | Age: 57
End: 2022-12-14

## 2022-12-14 DIAGNOSIS — E66.01 CLASS 3 SEVERE OBESITY WITH SERIOUS COMORBIDITY AND BODY MASS INDEX (BMI) OF 45.0 TO 49.9 IN ADULT, UNSPECIFIED OBESITY TYPE (H): Primary | ICD-10-CM

## 2022-12-14 DIAGNOSIS — E66.813 CLASS 3 SEVERE OBESITY WITH SERIOUS COMORBIDITY AND BODY MASS INDEX (BMI) OF 45.0 TO 49.9 IN ADULT, UNSPECIFIED OBESITY TYPE (H): Primary | ICD-10-CM

## 2022-12-14 PROCEDURE — 99451 NTRPROF PH1/NTRNET/EHR 5/>: CPT | Performed by: STUDENT IN AN ORGANIZED HEALTH CARE EDUCATION/TRAINING PROGRAM

## 2022-12-14 NOTE — TELEPHONE ENCOUNTER
Central Prior Authorization Team   Phone: [phone]    PA Initiation    Medication: Saxenda  Insurance Company: [employer] ([facility]) - Phone [phone] Fax [phone]  Pharmacy Filling the Rx: [address]  Filling Pharmacy Phone: [phone]  Filling Pharmacy Fax: [phone]  Start Date: 12/14/2022

## 2022-12-14 NOTE — PROGRESS NOTES
12/14/2022     E-Consult has been accepted.    Interprofessional consultation requested by:  Neyda Campos PA-C      Clinical Question/Purpose: MY CLINICAL QUESTION IS: History of bilateral emboli 3/2015 after AICD placement. Possible bariatric surgery. Post op guidance if needed.     Patient assessment and information reviewed:     57-year-old woman with a history of obesity, hypertension, COPD, fibromyalgia, CAD, VTE (bilateral pulmonary embolus March 2015 after placement of AICD), now undergoing evaluation for bariatric surgery.  Hematology consulted for evaluation of postoperative thrombotic risk.    In terms of her pulmonary embolism history, she was admitted with dyspnea several days after placement of an AICD.  CTA showed this bilateral small pulmonary emboli.  Treated with warfarin.    Assessment:     57-year-old woman with morbid obesity pending bariatric surgery and a prior provoked pulmonary embolism.  While AICD placement is not a particularly high risk surgery, the timing of this VTE event shortly after the surgery, as well as her multiple risk factors for VTE (obesity, PCOS) make this event provoked.  She certainly is at elevated risk after any upcoming bariatric surgery.  Given her morbid obesity, and that enoxaparin does not distribute well to the adipose tissue, I think that a higher and more frequent prophylaxis dose is merited.  If she had had an unprovoked clot previously, I might recommend therapeutic dose anticoagulation postoperatively, but I think that the below dosing balances the risks of bleeding and clotting postoperatively.    Recommendations:   - Postoperative VTE prophylaxis with enoxaparin 60 mg twice daily for 4 weeks      The recommendations provided in this E-Consult are based on a review of clinical data pertinent to the clinical question presented, without a review of the patient's complete medical record or, the benefit of a comprehensive in-person or virtual patient  evaluation. This consultation should not replace the clinical judgement and evaluation of the provider ordering this E-Consult. Any new clinical issues, or changes in patient status since the filing of this E-Consult will need to be taken into account when assessing these recommendations. Please contact me if you have further questions.    My total time spent reviewing clinical information and formulating assessment was 20 minutes.        Jacob Cogan, MD

## 2022-12-14 NOTE — TELEPHONE ENCOUNTER
PRIOR AUTHORIZATION DENIED    Medication: Ozempic - PA Denied    Denial Date: 12/5/2022    Denial Rational:     Appeal Information:

## 2022-12-14 NOTE — TELEPHONE ENCOUNTER
Please submit PA for Saxenda. Let Liaison know when Approved / Denied    [facility] COMMERCIAL  BIN: [id number]  ID: [id number]  GROUP: [facility]  PCN: [id number]

## 2022-12-14 NOTE — TELEPHONE ENCOUNTER
"Prior Authorization Retail Medication Request    Medication/Dose: Saxenda  ICD code (if different than what is on RX):  Class 3 severe obesity with serious comorbidity and body mass index (BMI) of 45.0 to 49.9 in adult, unspecified obesity type (H) [E66.01, Z68.42]  - Primary     Previously Tried and Failed:  Watching portions or calories, Exercise, Topamax/Topiramate, Metformin     Rationale:  I had the pleasure of seeing your patient, Shira Best, to evaluate her obesity and consider her for possible weight loss surgery. As you know, Sihra Best is 57 year old.  She has a height of 5' 2\", a weight of 252 lbs 8 oz, and calculated Body mass index is 46.18 kg/m . CO-MORBIDITIES OF OBESITY INCLUDE: Pre-Diabetes, Heart Disease, High Blood Pressure, High Cholesterol, Polycystic Ovarian Syndrome, GERD (Reflux), Osteoarthritis (joint disease)     Obesity onset in childhood. Weight gain has been gradual. Increased weight gain after college, started working at night and would eat late nights. Was able to lose 108lbs after her first heart attack in 2018 with low salt diet and decrease portion sizes. Lowest weight around 150lbs. Was able to sustain that weight for around 3 years.      Activity - will get injured every time will start to work out. Feels very discouraged. Able to walk around without issues. Can walk 100yards until needs to rest.      [facility] - working with psychiatrist and dietician. Has been going there since before summer 2022.     AOMs:   - Metformin - has been held off due to shoulder surgery. Restarting tomorrow. Helps with hunger control. Denies side effects   - Topiramate - for migraines. Does not help with hunger. Denies side effects     Pre-diabetes - A1C 5.8 7/19/2022.      CAD - followed by cardiology. Has pacemaker and defibrillator. Well controlled on medications. Echo was good. No recent stress test      HLD - controlled with medications      HTN - controlled with medications. " Does not check at home.      GERD - Well controlled with medications. Symptoms used to be regurg and burning in back of throat. Trigged by tomatoes and peanutbutter. EGD was normal      Fibromyalgia - Followed by PCP. Controlled with tylenol.      COPD -  Takes inhaler daily. Has had increased SOB due to recent weight gain. No recent exacerbations. Not on oxygen. Followed by pulmonology prior, now is followed by PCP.      Migraines - imgality once a month and topiramate.        Insurance Name:    Insurance ID:        Pharmacy Information (if different than what is on RX)  Name:  [facility] [address] [place] [address]  Phone:  [phone]

## 2022-12-15 NOTE — TELEPHONE ENCOUNTER
PRIOR AUTHORIZATION DENIED    Medication: Saxenda-PA DENIED     Denial Date: 12/14/2022    Denial Rational: Medication Exclusion from patients benefit plan for diagnosis Class 3 severe obesity with serious comorbidity and body mass index (BMI) of 45.0 to 49.9 in adult, unspecified obesity type (H) (E66.01 , Z68.42).             Appeal Information:

## 2023-01-06 ENCOUNTER — TELEPHONE (OUTPATIENT)
Dept: NEUROPSYCHOLOGY | Facility: CLINIC | Age: 58
End: 2023-01-06

## 2023-01-06 NOTE — TELEPHONE ENCOUNTER
Patient called as she could not remember when she has her psychological evaluation with Dr. Cat. Scheduled via video visit on Monday, February 20 at 8:30 a.m.

## 2023-01-12 ENCOUNTER — VIRTUAL VISIT (OUTPATIENT)
Dept: ENDOCRINOLOGY | Facility: CLINIC | Age: 58
End: 2023-01-12
Payer: COMMERCIAL

## 2023-01-12 VITALS — WEIGHT: 243 LBS | BODY MASS INDEX: 44.72 KG/M2 | HEIGHT: 62 IN

## 2023-01-12 DIAGNOSIS — E66.813 CLASS 3 SEVERE OBESITY WITH SERIOUS COMORBIDITY AND BODY MASS INDEX (BMI) OF 45.0 TO 49.9 IN ADULT, UNSPECIFIED OBESITY TYPE (H): Primary | ICD-10-CM

## 2023-01-12 DIAGNOSIS — K21.9 GASTROESOPHAGEAL REFLUX DISEASE WITHOUT ESOPHAGITIS: ICD-10-CM

## 2023-01-12 DIAGNOSIS — E66.01 CLASS 3 SEVERE OBESITY WITH SERIOUS COMORBIDITY AND BODY MASS INDEX (BMI) OF 45.0 TO 49.9 IN ADULT, UNSPECIFIED OBESITY TYPE (H): Primary | ICD-10-CM

## 2023-01-12 PROCEDURE — 99024 POSTOP FOLLOW-UP VISIT: CPT | Mod: VID | Performed by: SURGERY

## 2023-01-12 RX ORDER — SPIRONOLACTONE 25 MG/1
1 TABLET ORAL DAILY
Status: ON HOLD | COMMUNITY
Start: 2023-01-05 | End: 2023-08-30

## 2023-01-12 RX ORDER — EMPAGLIFLOZIN 10 MG/1
TABLET, FILM COATED ORAL
Status: ON HOLD | COMMUNITY
Start: 2023-01-05 | End: 2023-08-30

## 2023-01-12 ASSESSMENT — PAIN SCALES - GENERAL: PAINLEVEL: NO PAIN (0)

## 2023-01-12 NOTE — LETTER
1/12/2023       RE: Shira Best  [address] [place] [facility] [address]     Dear Colleague,    Thank you for referring your patient, Shira Best, to the [facility]. Please see a copy of my visit note below.    No charge        Again, thank you for allowing me to participate in the care of your patient.      Sincerely,    Gerard Higgins MD

## 2023-01-12 NOTE — NURSING NOTE
"(   Chief Complaint   Patient presents with     New Patient     Meet and greet    )    ( Weight: 110.2 kg (243 lb) (Pt reported) )  ( Height: 157.5 cm (5' 2\") )  ( BMI (Calculated): 44.44 )  (   )  (   )  (   )  (   )  (   )  (   )    (   )  (   )  (   )  (   )  (   )  (   )  (   )    (   Patient Active Problem List   Diagnosis     Coronary artery disease involving native coronary artery of native heart without angina pectoris     Automatic implantable cardioverter-defibrillator in situ     Essential hypertension     Hyperlipidemia     Fibromyalgia     Osteoarthritis of both knees, unspecified osteoarthritis type     Osteoarthritis of one hip, left     Osteopenia of multiple sites     S/P TKR (total knee replacement) using cement, right     Stage 2 moderate COPD by GOLD classification (H)     Stiffness of joints of both hands     Vitamin D deficiency     Migraine without aura and without status migrainosus, not intractable     H/O: hysterectomy     Cataract, left     Arthritis of right acromioclavicular joint     Anxiety state     Allergic rhinitis     Aspirin long-term use     Displacement of lumbar intervertebral disc without myelopathy     Macrocytosis     Class 3 severe obesity with serious comorbidity and body mass index (BMI) of 45.0 to 49.9 in adult (H)     Pre-diabetes    )  (   Current Outpatient Medications   Medication Sig Dispense Refill     acetaminophen (TYLENOL) 325 MG tablet Take 650 mg by mouth       albuterol (PROAIR HFA/PROVENTIL HFA/VENTOLIN HFA) 108 (90 Base) MCG/ACT inhaler Inhale 1-2 puffs into the lungs       aspirin (ASA) 81 MG chewable tablet 81 mg       benzonatate (TESSALON) 200 MG capsule Take 200 mg by mouth as needed       Budeson-Glycopyrrol-Formoterol (BREZTRI AEROSPHERE) 160-9-4.8 MCG/ACT AERO Inhale 2 puffs into the lungs 2 times daily       busPIRone (BUSPAR) 10 MG tablet Take 10 mg by mouth 3 times daily       butalbital-acetaminophen-caffeine (ESGIC) -40 MG tablet Take 1 " tablet by mouth as needed       famotidine (PEPCID) 40 MG tablet Take 1 tablet by mouth At Bedtime       furosemide (LASIX) 20 MG tablet Take 20 mg by mouth daily       gabapentin (NEURONTIN) 300 MG capsule Take 900 mg by mouth 3 times daily       galcanezumab-gnlm (EMGALITY) 120 MG/ML injection Inject 1 auto injector pen every month, rotate injection site locations each month. For monthly maintenance dosing to prevent headaches/migraines.       insulin pen needle (31G X 5 MM) 31G X 5 MM miscellaneous Use Saxenda pen needles daily or as directed. 100 each 1     JARDIANCE 10 MG TABS tablet TAKE 1 TABLET BY MOUTH ONCE DAILY - DO NOT CHEW OR CRUSH       magnesium 250 MG tablet Take 250 mg by mouth       metFORMIN (GLUCOPHAGE XR) 500 MG 24 hr tablet Take 500 mg by mouth       metoprolol succinate ER (TOPROL XL) 25 MG 24 hr tablet TAKE 1 & 1/2 (ONE & ONE-HALF) TABLETS BY MOUTH ONCE DAILY -  DO  NOT  CRUSH  OR  CHEW       nystatin (MYCOSTATIN) 141076 UNIT/GM external powder Apply 60 g topically as needed       oxyCODONE (ROXICODONE) 5 MG tablet Take 5 mg by mouth daily as needed       pantoprazole (PROTONIX) 40 MG EC tablet Take 40 mg by mouth At Bedtime       potassium chloride ER (K-TAB/KLOR-CON) 10 MEQ CR tablet Take 10 mEq by mouth daily       prochlorperazine (COMPAZINE) 5 MG tablet Take 1-2 tablets by mouth at onset of migraine headache, limit 3 times per week.       riboflavin 100 MG CAPS Take 1 tablet by mouth 2 times daily       rosuvastatin (CRESTOR) 40 MG tablet Take 1 tablet by mouth At Bedtime       sotalol (BETAPACE) 80 MG tablet Take 80 mg by mouth 2 times daily       spironolactone (ALDACTONE) 25 MG tablet Take 1 tablet by mouth daily at 2 pm       topiramate (TOPAMAX) 100 MG tablet Take 100 mg by mouth At Bedtime       traZODone (DESYREL) 150 MG tablet Take 150 mg by mouth At Bedtime       venlafaxine (EFFEXOR) 50 MG tablet Take 50 mg by mouth daily       liraglutide - Weight Management (SAXENDA) 18  MG/3ML pen Week 1: 0.6 mg subcutaneous daily, Week 2: 1.2 mg daily, Week 3: 1.8 mg daily, Week 4: 2.4 mg daily, Week 5 & on: 3 mg daily (Patient not taking: Reported on 1/12/2023) 15 mL 1     semaglutide (OZEMPIC) 2 MG/1.5ML SOPN pen Inject 0.25 mg subcutaneously once weekly for 4 weeks then 0.5 mg once weekly. (Patient not taking: Reported on 12/13/2022) 3 mL 1    )  ( Diabetes Eval:    )    ( Pain Eval:  No Pain (0) )    ( Wound Eval:       )    (   History   Smoking Status     Former     Types: Cigarettes   Smokeless Tobacco     Never    )    ( Signed By:  Hugo Villarreal, EMT; January 12, 2023; 8:04 AM )

## 2023-01-17 ENCOUNTER — VIRTUAL VISIT (OUTPATIENT)
Dept: ENDOCRINOLOGY | Facility: CLINIC | Age: 58
End: 2023-01-17
Payer: COMMERCIAL

## 2023-01-17 DIAGNOSIS — Z71.3 NUTRITIONAL COUNSELING: Primary | ICD-10-CM

## 2023-01-17 DIAGNOSIS — E66.9 OBESITY: ICD-10-CM

## 2023-01-17 PROCEDURE — 97803 MED NUTRITION INDIV SUBSEQ: CPT | Mod: VID | Performed by: DIETITIAN, REGISTERED

## 2023-01-17 NOTE — PROGRESS NOTES
"Shira Best is a 57 year old female who is being evaluated via a billable video visit.      The patient has been notified of following:     \"This video visit will be conducted via a call between you and your physician/provider. We have found that certain health care needs can be provided without the need for an in-person physical exam.  This service lets us provide the care you need with a video conversation.  If a prescription is necessary we can send it directly to your pharmacy.  If lab work is needed we can place an order for that and you can then stop by our lab to have the test done at a later time.    Video visits are billed at different rates depending on your insurance coverage.  Please reach out to your insurance provider with any questions.    If during the course of the call the physician/provider feels a video visit is not appropriate, you will not be charged for this service.\"    Patient has given verbal consent for Video visit? Yes  How would you like to obtain your AVS? MyChart  If you are dropped from the video visit, the video invite should be resent to: Text to cell phone: [phone]  Will anyone else be joining your video visit? No  {If patient encounters technical issues they should call [phone]      Video-Visit Details    Type of service:  Video Visit    Video Start Time: 10:20 AM  Video End Time: 10:30 AM    Originating Location (pt. Location): Home    Distant Location (provider location):  Offsite (providers home)    Platform used for Video Visit: [employer]    During this virtual visit the patient is located in [place], patient verifies this as the location during the entirety of this visit.       Return Bariatric Nutrition Consultation Note    Reason For Visit: Nutrition Assessment    Shira Best is a 57 year old presenting today for new bariatric nutrition consult.   Pt is interested in laparoscopic sleeve gastrectomy with Dr. Higgins expected surgery in [facility].  Patient is " "accompanied by self.  This is pt's second of 3 required nutrition visits prior to surgery.     Pt referred by CONCHIS Brandon on December 6, 2022.  CO-MORBIDITIES OF OBESITY INCLUDE:       12/3/2022   I have the following health issues associated with obesity: Pre-Diabetes, Heart Disease, High Blood Pressure, High Cholesterol, Polycystic Ovarian Syndrome, GERD (Reflux), Osteoarthritis (joint disease)       SUPPORT:  Support System Reviewed With Patient 12/3/2022   Who do you have in your support network that can be available to help you for the first 2 weeks after surgery? Yes my family   Who can you count on for support throughout your weight loss surgery journey? My sister and niece my mom       ANTHROPOMETRICS:  Estimated body mass index is 44.45 kg/m  as calculated from the following:    Height as of 1/12/23: 1.575 m (5' 2\").    Weight as of 1/12/23: 110.2 kg (243 lb).     Current weight: 243 lbs     Required weight loss goal pre-op: -10 lbs from initial consult weight (goal weight 242 lbs or less before surgery)       12/3/2022   I have tried the following methods to lose weight Watching portions or calories, Exercise       Weight Loss Questions Reviewed With Patient 12/3/2022   How long have you been overweight? Since early childhood       SUPPLEMENT INFORMATION:  none    NUTRITION HISTORY:  Per CONCHIS Tierney: Would like to go through surgery because she is unable to lose weight like she had in the past. Has become harder to exercise    Working with [facility]- working on portion sizes and decreasing carb portions.     1/17/22: Pt states she lost 10 lbs but regained d/t sciatica and plantar fascitis. Has been eating smaller portions and eating very slowly. Appetite has decreased.     Barriers to lifestyle change: Difficulty exercising, previously failed weight loss attempts     Recall Diet Questions Reviewed With Patient 12/3/2022   Describe what you typically consume for breakfast (typical or " most recent): Bagles, yogurt apple   Describe what you typically consume for lunch (typical or most recent): [place], water flavored, bagel apple.   Describe what you typically consume for supper (typical or most recent): Piece of meat, potato, veggie milk   Describe what you typically consume as snacks (typical or most recent): Chips, crackers ,apples, candy, pop, sweetbread,.   How many ounces of water, or other low calorie drinks, do you drink daily (8 oz=1 glass)? 48 oz   How many ounces of caffeine (coffee, tea, pop) do you drink daily (8 oz=1 glass)? 24 oz   How many ounces of carbonated (pop, beer, sparkling water) drinks do you drinky daily (8 oz=1 glass)? 32 oz   How many ounces of juice, pop, sweet tea, sports drinks, protein drinks, other sweetened drinks, do you drink daily (8 oz=1 glass)? 32 oz   How many ounces of milk do you drink daily (8 oz=1 glass) 8 oz   Please indicate the type of milk: 1%   How often do you drink alcohol? Never       Eating Habits 12/3/2022   Do you have any dietary restrictions? Yes   Do you currently binge eat (eat a large amount of food in a short time)? No   Are you an emotional eater? Yes   Do you get up to eat after falling asleep? No   What foods do you crave? Chocolate salads fruits       Dining Out History Reviewed With Patient 12/3/2022   How often do you dine out? Rarely.   Where do you dine out? (select all that apply) sit-down restaurants   What types of food do you order when you dine out? tenzin V[employer], Chinese         EXERCISE:       12/3/2022   How often do you exercise? 1 to 2 times per week   What is the duration of your exercise (in minutes)? 30 Minutes   What types of exercise do you do? home gym   What keeps you from being more active?  Pain, I have just had surgery on one or more of my joints     Progress on Previous Goals  Relating To Eatin) Eat slowly (20-30 minutes per meal), chewing foods well (25 chews per bite/applesauce consistency) met,  "continues  2) 9\" Plate method (1/2 plate non-starchy vegetables/fruit, 1/4 plate lean protein, 1/4 plate whole grain starch - no more than 1/2 cup carb/meal) met, continues     Relating to beverages:  1) Separate fluids from meals and for 30 minutes after met, continues    NUTRITION DIAGNOSIS:  Obesity r/t long history of positive energy balance aeb BMI >30 kg/m2.    INTERVENTION:  Intervention Provided/Education Provided on post-op diet guidelines, vitamins/minerals essential post-operatively, GI anatomy of bariatric surgeries, ways to help prepare for post-op diet guidelines pre-operatively, portion/calorie-control, mindful eating and sources of protein.  Patient demonstrates understanding.     Personal barriers to making and continuing required life changes have been identified, and strategies to overcome those barriers have been recommended AND family and social supports have been assessed and strategies to strengthen those supports have been recommended.    Provided pt with list of goals, RD contact information and resources listed below via [employer].         Questions Reviewed With Patient 12/3/2022   How ready are you to make changes regarding your weight? Number 1 = Not ready at all to make changes up to 10 = very ready. 10   How confident are you that you can change? 1 = Not confident that you will be successful making changes up to 10 = very confident. 10       Expected Engagement: good    GOALS:  Relating To Eatin) Eat slowly (20-30 minutes per meal), chewing foods well (25 chews per bite/applesauce consistency)   2) 9\" Plate method (1/2 plate non-starchy vegetables/fruit, 1/4 plate lean protein, 1/4 plate whole grain starch - no more than 1/2 cup carb/meal)     Relating to beverages:  1) Separate fluids from meals and for 30 minutes after    Your Stage 1 Diet: Clear Liquids  [email or web address]     Your Stage 2 Diet: Low-fat Full Liquids  [email or web address]     Your Stage 3 Diet: " Pureed Foods  [email or web address]     Pureed Pleasures  [email or web address]    Your Stage 4 Diet: Soft Foods  [email or web address]    Your Stage 5 Diet: Regular Foods  [email or web address]    Supplements after Weight Loss Surgery  [email or web address]     The Plate Method  [email or web address]    Protein Sources for Weight Loss  [email or web address]     Carbohydrates  [email or web address]     Mindful Eating  [email or web address]     Summary of Volumetrics Eating Plan  [email or web address]     Diet Guidelines after Weight Loss Surgery  [email or web address]     Seated Exercises for Arms and Legs (can be done before or after surgery)  [email or web address]    Follow up: 1 month, prn    Time spent with patient: 10 minutes.  KALYANI BOATENG RD, LD

## 2023-01-17 NOTE — LETTER
"1/17/2023       RE: Shira Best  [address] [address]     Dear Colleague,    Thank you for referring your patient, Shira Best, to the [facility] [place] [facility]. Please see a copy of my visit note below.    Shira Best is a 57 year old female who is being evaluated via a billable video visit.      The patient has been notified of following:     \"This video visit will be conducted via a call between you and your physician/provider. We have found that certain health care needs can be provided without the need for an in-person physical exam.  This service lets us provide the care you need with a video conversation.  If a prescription is necessary we can send it directly to your pharmacy.  If lab work is needed we can place an order for that and you can then stop by our lab to have the test done at a later time.    Video visits are billed at different rates depending on your insurance coverage.  Please reach out to your insurance provider with any questions.    If during the course of the call the physician/provider feels a video visit is not appropriate, you will not be charged for this service.\"    Patient has given verbal consent for Video visit? Yes  How would you like to obtain your AVS? MyChart  If you are dropped from the video visit, the video invite should be resent to: Text to cell phone: [phone]  Will anyone else be joining your video visit? No  {If patient encounters technical issues they should call [phone]      Video-Visit Details    Type of service:  Video Visit    Video Start Time: 10:20 AM  Video End Time: 10:30 AM    Originating Location (pt. Location): Home    Distant Location (provider location):  Offsite (providers home)    Platform used for Video Visit: [employer]    During this virtual visit the patient is located in [place], patient verifies this as the location " "during the entirety of this visit.       Return Bariatric Nutrition Consultation Note    Reason For Visit: Nutrition Assessment    Shira Best is a 57 year old presenting today for new bariatric nutrition consult.   Pt is interested in laparoscopic sleeve gastrectomy with Dr. Higgins expected surgery in [place].  Patient is accompanied by self.  This is pt's second of 3 required nutrition visits prior to surgery.     Pt referred by CONCHIS Brandon on December 6, 2022.  CO-MORBIDITIES OF OBESITY INCLUDE:       12/3/2022   I have the following health issues associated with obesity: Pre-Diabetes, Heart Disease, High Blood Pressure, High Cholesterol, Polycystic Ovarian Syndrome, GERD (Reflux), Osteoarthritis (joint disease)       SUPPORT:  Support System Reviewed With Patient 12/3/2022   Who do you have in your support network that can be available to help you for the first 2 weeks after surgery? Yes my family   Who can you count on for support throughout your weight loss surgery journey? My sister and niece my mom       ANTHROPOMETRICS:  Estimated body mass index is 44.45 kg/m  as calculated from the following:    Height as of 1/12/23: 1.575 m (5' 2\").    Weight as of 1/12/23: 110.2 kg (243 lb).     Current weight: 243 lbs     Required weight loss goal pre-op: -10 lbs from initial consult weight (goal weight 242 lbs or less before surgery)       12/3/2022   I have tried the following methods to lose weight Watching portions or calories, Exercise       Weight Loss Questions Reviewed With Patient 12/3/2022   How long have you been overweight? Since early childhood       SUPPLEMENT INFORMATION:  none    NUTRITION HISTORY:  Per CONCHIS Tierney: Would like to go through surgery because she is unable to lose weight like she had in the past. Has become harder to exercise    Working with [facility] RD- working on portion sizes and decreasing carb portions.     1/17/22: Pt states she lost 10 lbs but regained d/t " sciatica and plantar fascitis. Has been eating smaller portions and eating very slowly. Appetite has decreased.     Barriers to lifestyle change: Difficulty exercising, previously failed weight loss attempts     Recall Diet Questions Reviewed With Patient 12/3/2022   Describe what you typically consume for breakfast (typical or most recent): Bagles, yogurt apple   Describe what you typically consume for lunch (typical or most recent): [place], water flavored, bagel apple.   Describe what you typically consume for supper (typical or most recent): Piece of meat, potato, veggie milk   Describe what you typically consume as snacks (typical or most recent): Chips, crackers ,apples, candy, pop, sweetbread,.   How many ounces of water, or other low calorie drinks, do you drink daily (8 oz=1 glass)? 48 oz   How many ounces of caffeine (coffee, tea, pop) do you drink daily (8 oz=1 glass)? 24 oz   How many ounces of carbonated (pop, beer, sparkling water) drinks do you drinky daily (8 oz=1 glass)? 32 oz   How many ounces of juice, pop, sweet tea, sports drinks, protein drinks, other sweetened drinks, do you drink daily (8 oz=1 glass)? 32 oz   How many ounces of milk do you drink daily (8 oz=1 glass) 8 oz   Please indicate the type of milk: 1%   How often do you drink alcohol? Never       Eating Habits 12/3/2022   Do you have any dietary restrictions? Yes   Do you currently binge eat (eat a large amount of food in a short time)? No   Are you an emotional eater? Yes   Do you get up to eat after falling asleep? No   What foods do you crave? Chocolate salads fruits       Dining Out History Reviewed With Patient 12/3/2022   How often do you dine out? Rarely.   Where do you dine out? (select all that apply) sit-down restaurants   What types of food do you order when you dine out? Pizza, steak, Chinese         EXERCISE:       12/3/2022   How often do you exercise? 1 to 2 times per week   What is the duration of your exercise (in  "minutes)? 30 Minutes   What types of exercise do you do? home gym   What keeps you from being more active?  Pain, I have just had surgery on one or more of my joints     Progress on Previous Goals  Relating To Eatin) Eat slowly (20-30 minutes per meal), chewing foods well (25 chews per bite/applesauce consistency) met, continues  2) 9\" Plate method (1/2 plate non-starchy vegetables/fruit, 1/4 plate lean protein, 1/4 plate whole grain starch - no more than 1/2 cup carb/meal) met, continues     Relating to beverages:  1) Separate fluids from meals and for 30 minutes after met, continues    NUTRITION DIAGNOSIS:  Obesity r/t long history of positive energy balance aeb BMI >30 kg/m2.    INTERVENTION:  Intervention Provided/Education Provided on post-op diet guidelines, vitamins/minerals essential post-operatively, GI anatomy of bariatric surgeries, ways to help prepare for post-op diet guidelines pre-operatively, portion/calorie-control, mindful eating and sources of protein.  Patient demonstrates understanding.     Personal barriers to making and continuing required life changes have been identified, and strategies to overcome those barriers have been recommended AND family and social supports have been assessed and strategies to strengthen those supports have been recommended.    Provided pt with list of goals, RD contact information and resources listed below via [employer].         Questions Reviewed With Patient 12/3/2022   How ready are you to make changes regarding your weight? Number 1 = Not ready at all to make changes up to 10 = very ready. 10   How confident are you that you can change? 1 = Not confident that you will be successful making changes up to 10 = very confident. 10       Expected Engagement: good    GOALS:  Relating To Eatin) Eat slowly (20-30 minutes per meal), chewing foods well (25 chews per bite/applesauce consistency)   2) 9\" Plate method (1/2 plate non-starchy vegetables/fruit, 1/4 " plate lean protein, 1/4 plate whole grain starch - no more than 1/2 cup carb/meal)     Relating to beverages:  1) Separate fluids from meals and for 30 minutes after    Your Stage 1 Diet: Clear Liquids  [email or web address]     Your Stage 2 Diet: Low-fat Full Liquids  [email or web address]     Your Stage 3 Diet: Pureed Foods  [email or web address]     Pureed Pleasures  [email or web address]    Your Stage 4 Diet: Soft Foods  [email or web address]    Your Stage 5 Diet: Regular Foods  [email or web address]    Supplements after Weight Loss Surgery  [email or web address]     The Plate Method  [email or web address]    Protein Sources for Weight Loss  [email or web address]     Carbohydrates  [email or web address]     Mindful Eating  [email or web address]     Summary of Volumetrics Eating Plan  [email or web address]     Diet Guidelines after Weight Loss Surgery  [email or web address]     Seated Exercises for Arms and Legs (can be done before or after surgery)  [email or web address]    Follow up: 1 month, prn    Time spent with patient: 10 minutes.  KALYANI BOATENG RD, LD        Again, thank you for allowing me to participate in the care of your patient.      Sincerely,    KALYANI BOATENG RD

## 2023-01-17 NOTE — LETTER
Date:January 18, 2023      Provider requested that no letter be sent. Do not send.       [facility]

## 2023-01-18 ENCOUNTER — TELEPHONE (OUTPATIENT)
Dept: SURGERY | Facility: CLINIC | Age: 58
End: 2023-01-18
Payer: COMMERCIAL

## 2023-01-18 NOTE — TELEPHONE ENCOUNTER
Patient called stating she needs to have an endoscopy with Dr. Higgins, [place] of [place] to the [facility]. I told her I would ask Dr. Higgins and get back with her, left message for Simona SAM RN to inquire and get back to me.

## 2023-01-20 ENCOUNTER — TELEPHONE (OUTPATIENT)
Dept: ENDOCRINOLOGY | Facility: CLINIC | Age: 58
End: 2023-01-20

## 2023-01-20 NOTE — TELEPHONE ENCOUNTER
Reason for call:  Other   Patient called regarding (reason for call): call back  Additional comments: pt called in to let you know she has not heard back from [facility] regarding the endoscopy yet    Phone number to reach patient:  Home number on file [phone] (home)    Best Time:      Can we leave a detailed message on this number?  YES    Travel screening: Not Applicable

## 2023-01-23 ENCOUNTER — TELEPHONE (OUTPATIENT)
Dept: SURGERY | Facility: CLINIC | Age: 58
End: 2023-01-23
Payer: COMMERCIAL

## 2023-01-23 NOTE — TELEPHONE ENCOUNTER
Left VM message letting patient know Dr. Higgins got back to me and he would like to do the endoscopy rather than have her have it done in [place]. Asked patient to call me to discuss potential dates, will try to a date that has a 1 p.m. opening.

## 2023-02-03 ENCOUNTER — TELEPHONE (OUTPATIENT)
Dept: ENDOCRINOLOGY | Facility: CLINIC | Age: 58
End: 2023-02-03

## 2023-02-03 NOTE — TELEPHONE ENCOUNTER
CAITLYN for patient informing them that Neyda Campos would not be able to complete a video visit today  due to not having licensure in [place] where the  Patient lives.    Patient was informed that they would be a able to proceed with video visit if they were in the [facility].    Patient will be called within a half hour by the care coordinators to be rescheduled with either Dr. Higgins or SHANTI

## 2023-02-07 ENCOUNTER — ALLIED HEALTH/NURSE VISIT (OUTPATIENT)
Dept: ENDOCRINOLOGY | Facility: CLINIC | Age: 58
End: 2023-02-07
Payer: COMMERCIAL

## 2023-02-07 DIAGNOSIS — E66.813 CLASS 3 SEVERE OBESITY WITH SERIOUS COMORBIDITY AND BODY MASS INDEX (BMI) OF 45.0 TO 49.9 IN ADULT, UNSPECIFIED OBESITY TYPE (H): Primary | ICD-10-CM

## 2023-02-07 DIAGNOSIS — E66.01 CLASS 3 SEVERE OBESITY WITH SERIOUS COMORBIDITY AND BODY MASS INDEX (BMI) OF 45.0 TO 49.9 IN ADULT, UNSPECIFIED OBESITY TYPE (H): Primary | ICD-10-CM

## 2023-02-07 PROCEDURE — 99207 PR NO CHARGE NURSE ONLY: CPT

## 2023-02-12 ENCOUNTER — HEALTH MAINTENANCE LETTER (OUTPATIENT)
Age: 58
End: 2023-02-12

## 2023-02-14 ENCOUNTER — TELEPHONE (OUTPATIENT)
Dept: ENDOCRINOLOGY | Facility: CLINIC | Age: 58
End: 2023-02-14
Payer: COMMERCIAL

## 2023-02-14 NOTE — TELEPHONE ENCOUNTER
Called patient as she is scheduled for an upper endoscopy on March 1 with Dr. Higgins at 2 p.m. Need to move her time up to 1:30 to accommodate another patient. Patient is fine with the change and will arrive at the hospital at 12:30 p.m.

## 2023-02-14 NOTE — PROGRESS NOTES
New Bariatric Patient Class    Shira Best attended the New Consult WLS class today.     Patient's current comorbidities include:  Patient Active Problem List   Diagnosis     Coronary artery disease involving native coronary artery of native heart without angina pectoris     Automatic implantable cardioverter-defibrillator in situ     Essential hypertension     Hyperlipidemia     Fibromyalgia     Osteoarthritis of both knees, unspecified osteoarthritis type     Osteoarthritis of one hip, left     Osteopenia of multiple sites     S/P TKR (total knee replacement) using cement, right     Stage 2 moderate COPD by GOLD classification (H)     Stiffness of joints of both hands     Vitamin D deficiency     Migraine without aura and without status migrainosus, not intractable     H/O: hysterectomy     Cataract, left     Arthritis of right acromioclavicular joint     Anxiety state     Allergic rhinitis     Aspirin long-term use     Displacement of lumbar intervertebral disc without myelopathy     Macrocytosis     Class 3 severe obesity with serious comorbidity and body mass index (BMI) of 45.0 to 49.9 in adult (H)     Pre-diabetes       Current Outpatient Medications   Medication Sig Dispense Refill     acetaminophen (TYLENOL) 325 MG tablet Take 650 mg by mouth       albuterol (PROAIR HFA/PROVENTIL HFA/VENTOLIN HFA) 108 (90 Base) MCG/ACT inhaler Inhale 1-2 puffs into the lungs       aspirin (ASA) 81 MG chewable tablet 81 mg       benzonatate (TESSALON) 200 MG capsule Take 200 mg by mouth as needed       Budeson-Glycopyrrol-Formoterol (BREZTRI AEROSPHERE) 160-9-4.8 MCG/ACT AERO Inhale 2 puffs into the lungs 2 times daily       busPIRone (BUSPAR) 10 MG tablet Take 10 mg by mouth 3 times daily       butalbital-acetaminophen-caffeine (ESGIC) -40 MG tablet Take 1 tablet by mouth as needed       famotidine (PEPCID) 40 MG tablet Take 1 tablet by mouth At Bedtime       furosemide (LASIX) 20 MG tablet Take 20 mg by mouth  daily       gabapentin (NEURONTIN) 300 MG capsule Take 900 mg by mouth 3 times daily       galcanezumab-gnlm (EMGALITY) 120 MG/ML injection Inject 1 auto injector pen every month, rotate injection site locations each month. For monthly maintenance dosing to prevent headaches/migraines.       insulin pen needle (31G X 5 MM) 31G X 5 MM miscellaneous Use Saxenda pen needles daily or as directed. 100 each 1     JARDIANCE 10 MG TABS tablet TAKE 1 TABLET BY MOUTH ONCE DAILY - DO NOT CHEW OR CRUSH       liraglutide - Weight Management (SAXENDA) 18 MG/3ML pen Week 1: 0.6 mg subcutaneous daily, Week 2: 1.2 mg daily, Week 3: 1.8 mg daily, Week 4: 2.4 mg daily, Week 5 & on: 3 mg daily (Patient not taking: Reported on 1/12/2023) 15 mL 1     magnesium 250 MG tablet Take 250 mg by mouth       metFORMIN (GLUCOPHAGE XR) 500 MG 24 hr tablet Take 500 mg by mouth       metoprolol succinate ER (TOPROL XL) 25 MG 24 hr tablet TAKE 1 & 1/2 (ONE & ONE-HALF) TABLETS BY MOUTH ONCE DAILY -  DO  NOT  CRUSH  OR  CHEW       nystatin (MYCOSTATIN) 706112 UNIT/GM external powder Apply 60 g topically as needed       oxyCODONE (ROXICODONE) 5 MG tablet Take 5 mg by mouth daily as needed       pantoprazole (PROTONIX) 40 MG EC tablet Take 40 mg by mouth At Bedtime       potassium chloride ER (K-TAB/KLOR-CON) 10 MEQ CR tablet Take 10 mEq by mouth daily       prochlorperazine (COMPAZINE) 5 MG tablet Take 1-2 tablets by mouth at onset of migraine headache, limit 3 times per week.       riboflavin 100 MG CAPS Take 1 tablet by mouth 2 times daily       rosuvastatin (CRESTOR) 40 MG tablet Take 1 tablet by mouth At Bedtime       semaglutide (OZEMPIC) 2 MG/1.5ML SOPN pen Inject 0.25 mg subcutaneously once weekly for 4 weeks then 0.5 mg once weekly. (Patient not taking: Reported on 12/13/2022) 3 mL 1     sotalol (BETAPACE) 80 MG tablet Take 80 mg by mouth 2 times daily       spironolactone (ALDACTONE) 25 MG tablet Take 1 tablet by mouth daily at 2 pm        topiramate (TOPAMAX) 100 MG tablet Take 100 mg by mouth At Bedtime       traZODone (DESYREL) 150 MG tablet Take 150 mg by mouth At Bedtime       venlafaxine (EFFEXOR) 50 MG tablet Take 50 mg by mouth daily         The following items were reviewed and questions answered.  1. Goal weight  2. Labs  3. Psych clearance  4. Specialty Clearances  5. Task list  6. Preop diet and exercise changes  7. Postop diet requirements  8. Postop medication requirements  9. Postop exercise  10. Postop lifetime behavior and diet changes    Patient will continue to meet with MEY, Dietician and Surgeon as required preoperatively.    All questions answered.  Patient instructed to contact the office for any questions and to notify office of any updates/clearances completed.    Bariatric Task List    Fax:  Please fax all paperwork to: [phone] -     Status:  Is patient a candidate for bariatric surgery?:  patient is a candidate for bariatric surgery -     Cleared to schedule surgeon consult?:  cleared to schedule surgeon consult -     Status:  surgery evaluation in process -     Surgeon: Dr. Higgins -     Tentative surgery month/year: TBD -        Insurance: Insurance:  [employer] -      Contact insurance to discuss coverage: Needed -       C[employer]: PCP Recommendation and Medical Clearance:    -     HP Referral:    -      Advanced beneficiary notification (ABN) for Medicare patients for RD visits   and surgery:   -      Weight history:   -     Other:    -        Patient Info: Initial Weight:  252 -     Date of Initial Weight/Height:  12/5/2022 -     Goal Weight (lbs):  242 -     Required Weight Loss:  10 -     Surgery Type:  undecided -     Multidisciplinary Meeting:    -        Dietician Visits: Structured weight loss required by insurance?:    -     Dietician Visit 1:  Completed - 12/6/22, Ronda Tierney [place]   Dietician Visit 2:  Completed - 1/17/23, Ronda Tierney [place]   Dietician Visit 3:  Needed -      Dietician Visit 4:    -     Dietician Visit 5:    -     Dietician Visit 6:    -     Dietician Visit additional:    - Monthly until surgery - [place]   Clearance from dietician to see surgeon?:    -     Dietician Notes:    -        Psychological Evaluation: Psych eval:  Needed - List and letter sent to patient, 12/7/22, [place] Scheduled for Feb 23rd ES   Therapist letter of support:  Needed - Letter sent to patient, 12/7/22, [place]   Psychiatrist letter of support:    -     Establish care with therapist:    -     Complete eating disorder evaluation:    -     Letter of clearance from therapist/eating disorder program:    -     Other:    -        Lab Work: Complete Blood Count:  Needed -     Comprehensive Metabolic Panel:  Needed -     Vitamin D:  Needed -     PTH:  Needed -     Hgb A1c:  Needed -      Lipids: Needed -      TSH ([facility], [facility], [place] MA):   -       Ferritin:   -       Folate:   -       Testosterone, Total and Free:   -     Thiamine:   -     Vitamin A:   -     Vitamin B12:   -     Zinc:   -     C-peptide:   -     H. pylori:    -     MRSA (2 swabs, minimum 48 hours apart):   -     Nicotine Testing:    -     Recheck Vitamin D:   -     Other:    -        Consults/ Clearance Sleep Medicine:    -     Cardiac:  Needed - Letter sent to patient, 12/7/22, [place]   Pain:   - Possibly, to eval at next visit   Dental:    -     Endocrine:    -     Gastroenterology:    -     Vascular Medicine:    -     Hematology:  Completed - econlt per heme, Letter sent to patient, 12/7/22, [place], advised 60mg Lovenox twice daily for 4 weeks post op - [facility]   Medical Weight Management:   -     Physical Therapy/Exercise:    -     Nephrology:    -     Neurology:  Needed - Letter sent to patient, 12/7/22, [place]   Pulmonology:  Needed - Letter sent to patient, 12/7/22, [place]   Rheumatology:    -     Other:    -     Other:    -     Other:    -        Testing: UGI:    -     EGD:    -     Sleep Study:   -     Other:   -     Other:    -        PCP: Establish care with  "PCP:  Completed -     Follow up with PCP:    -     PCP letter of support:  Needed - Letter sent to patient, 12/7/22, [place]      Stopping Smoking/ Alcohol Use: Quit tobacco use (3 months smoke free)?:    -     Quit date:    -     Quit alcohol use:   -     Quit date:   -     Other:   -     Quit date:   -        Patient Education:  Information Session:  Needed -     Given \"Making your decision\" handout?:  Yes -     Given \"A Roadmap to you Weight Loss Surgery\" handout?: Yes -     Given \"Get Well Loop\" information?: Yes -     Given support group information?:    -     Attended support group?:  Needed -     Support plan in place?:    -     Research consents signed?:    -     Avoid NSAIDS/ Alternate Plan for Pain:   -        Additional Surgery Requirements: Review Coag plan:    -     HgA1c <8:    -     Inpatient pain consult:    -     Final nicotine screen:    -     Dental work complete:    -     Birth control plan:    -     Gallstone prevention plan (Actigall for 6 months postop):   -     Other:   -     Other:   -        Final Tasks:  Before surgery online class:  Needed -     Before surgery online class website link:  [email or web address]   After surgery online class:  Needed -     After surgery online class website link:  [email or web address]   Nurse visit per clinic:  Needed -     History and Physical per clinic:   -     Final labs per clinic: Needed -     Chest xray per clinic:   -     Electrocardiogram (ECG) per clinic:   -     Other:   -        Notes:   -          "

## 2023-02-15 ENCOUNTER — TELEPHONE (OUTPATIENT)
Dept: GASTROENTEROLOGY | Facility: CLINIC | Age: 58
End: 2023-02-15

## 2023-02-15 ENCOUNTER — OFFICE VISIT (OUTPATIENT)
Dept: ENDOCRINOLOGY | Facility: CLINIC | Age: 58
End: 2023-02-15
Payer: COMMERCIAL

## 2023-02-15 DIAGNOSIS — Z71.3 NUTRITIONAL COUNSELING: Primary | ICD-10-CM

## 2023-02-15 DIAGNOSIS — E66.9 OBESITY: ICD-10-CM

## 2023-02-15 PROCEDURE — 97804 MEDICAL NUTRITION GROUP: CPT

## 2023-02-15 PROCEDURE — 99207 PR NO CHARGE LOS: CPT

## 2023-02-15 NOTE — TELEPHONE ENCOUNTER
From 1/31/23        Screening Questions  BLUE  KIND OF PREP RED  LOCATION [review exclusion criteria] GREEN  SEDATION TYPE        y Are you active on mychart?       Gisela Ordering/Referring Provider?        [facility] What type of coverage do you have?      n Have you had a positive covid test in the last 14 days?     43.9 1. BMI  [BMI 40+ - review exclusion criteria]    y  2. Are you able to give consent for your medical care? [IF NO,RN REVIEW]          n  3. Are you taking any prescription pain medications on a routine schedule   (ex narcotics: oxycodone, roxicodone, oxycontin,  and percocet)? [RN Review]        n  3a. EXTENDED PREP What kind of prescription?     n 4. Do you have any chemical dependencies such as alcohol, street drugs, or methadone?        **If yes 3- 5 , please schedule with MAC sedation.**          IF YES TO ANY 6 - 10 - HOSPITAL SETTING ONLY.     n 6.   Do you need assistance transferring?     n 7.   Have you had a heart or lung transplant?    n 8.   Are you currently on dialysis?   n 9.   Do you use daily home oxygen?   n 10. Do you take nitroglycerin?   10a. n If yes, how often?     11. [FEMALES]  n Are you currently pregnant?    11a. n If yes, how many weeks? [ Greater than 12 weeks, OR NEEDED]    n 12. Do you have Pulmonary Hypertension? *NEED PAC APPT AT [facility] w/ MAC*     n 13. [review exclusion criteria]  Do you have any implantable devices in your body (pacemaker, defib, LVAD)?    n 14. In the past 6 months, have you had any heart related issues including cardiomyopathy or heart attack?     14a. n If yes, did it require cardiac stenting if so when?     n 15. Have you had a stroke or Transient ischemic attack (TIA - aka  mini stroke ) within 6 months?      n 16. Do you have mod to severe Obstructive Sleep Apnea?  [Hospital only]    n 17. Do you have SEVERE AND UNCONTROLLED asthma? *NEED PAC APPT AT [facility] w/MAC*     n 18. Are you currently taking any blood thinners?     18a. If yes, inform  "patient to \"follow up w/ ordering provider for bridging instructions.\"    n 19. Do you take the medication Phentermine?    19a. If yes, \"Hold for 7 days before procedure.  Please consult your prescribing provider if you have questions about holding this medication.\"       20. Do you have chronic kidney disease?        21. Do you have a diagnosis of diabetes?       22. On a regular basis do you go 3-5 days between bowel movements?      23. Preferred LOCAL Pharmacy for Pre Prescription    [ LIST ONLY ONE PHARMACY]     [address]        - [facility] REMINDERS -    Informed patient they will need an adult    Cannot take any type of public or medical transportation alone    Conscious Sedation- Needs  for 6 hours after the procedure       MAC/General-Needs  for 24 hours after procedure    Pre-Procedure Covid test to be completed [[facility] PCR Testing Required]    Confirmed Nurse will call to complete assessment       - SCHEDULING DETAILS -  n Hospital Setting Required? If yes, what is the exclusion?:    Gisela  Surgeon    3/1/23  Date of Procedure  Upper Endoscopy [EGD]  Type of Procedure Scheduled  [facility]        CS Sedation Type     yes PAC / Pre-op Required                 "

## 2023-02-15 NOTE — PATIENT INSTRUCTIONS
Robert Silva!    Follow-up with RD in 1 month    Thank you,    Ronda Ferreira, DELTA, LD  If you would like to schedule or reschedule an appointment with the RD, please call [phone]    Nutrition Goals  Relating To Eating:  - Eat slowly (20-30 minutes per meal), chewing foods well (25 chews per bite/applesauce consistency)  - Focus on eating smaller portion sizes at meals and snacks    Relating to beverages:  - Reduce caffeine/carbonation/calorie containing beverages  - Separate fluids from meals by 30 minutes before, during, and after eating  - Drink 48-64 ounces of fluid per day. Small, frequent sips between meals.    Relating to activity:  Increase activity as able      Protein Sources for Weight Loss  [email or web address]     Carbohydrates  [email or web address]     Mindful Eating  [email or web address]     Diet Guidelines after Weight Loss Surgery  [email or web address]     Seated Exercises for Arms and Legs (can be done before or after surgery)  [email or web address]    Post-op Diet Handouts:  Diet Guidelines after Weight-loss Surgery  [email or web address]     Your Stage 1 Diet: Clear Liquids  [email or web address]     Your Stage 2 Diet: Low-fat Full Liquids  [email or web address]     Your Stage 3 Diet: Pureed Foods  [email or web address]     Pureed Pleasures  [email or web address]    Your Stage 4 Diet: Soft Foods  [email or web address]    Your Stage 5 Diet: Regular Foods  [email or web address]    Supplements after Sleeve Gastrectomy, Gastric Bypass or Single Anastomosis Duodenal Switch  [email or web address]    Keeping Track of Fluids  [email or web address]      Interested in working with a health ? Health coaches work with you to improve your overall health and wellbeing. They look at the whole person, and may involve discussion of different areas of life, including, but not limited  to the four pillars of health (sleep, exercise, nutrition, and stress management). Discuss with your care team if you would like to start working a health .    Health Coaching-3 Pack:    $99 for three health coaching visits    Visits may be done in person or via phone    Coaching is a partnership between the  and the client; Coaches do not prescribe or diagnose    Coaching helps inspire the client to reach his/her personal goals    COMPREHENSIVE WEIGHT MANAGEMENT PROGRAM  VIRTUAL SUPPORT GROUPS    For Support Group Information:      We offer support groups for patients who are working on weight loss and considering, preparing for or have had weight loss surgery.   There is no cost for this opportunity.  You are invited to attend the?Virtual Support Groups?provided by any of the following locations:    [facility] via Microsoft Teams with Gemini Jay RN  2.   [place] via [employer] with Daren Kline, PhD, LP  3.   [place] via [employer] with Georgia Abrams RN  4.   [facility] via Microsoft Teams with Georgia Cosby [facility]    The following Support Group information can also be found on our website:  [email or web address]    [email or web address]    [facility] Weight Loss Surgery Support Group    [facility] Weight Loss Surgery Support Group  The support group is a patient-lead forum that meets monthly to share experiences, encouragement and education. It is open to those who have had weight loss surgery, are scheduled for surgery, or are considering surgery.   WHEN: This group meets on the 3rd Wednesday of each month from 5:00PM - 6:00PM virtually using Microsoft Teams.   FACILITATOR: Led by Gemini Epperson, RD, LD, RN, the program's Clinical Coordinator.   TO REGISTER: Please contact the clinic via [employer] or call the nurse line  "directly at [phone] to inform our staff that you would like an invite sent to you and to let us know the email you would like the invite sent to. Prior to the meeting, a link with directions on how to join the meeting will be sent to you.    2023 Meetings (speakers to be determined)  January 18: \"Let's Talk\" a time for the group to share.  February 15: \"Let's Talk\" a time for the group to share.  March 15: \"Let's Talk\" a time for the group to share.  April 19: \"Let's Talk\" a time for the group to share.  May 17: \"Let's Talk\" a time for the group to share.  June 21: \"Let's Talk\" a time for the group to share.    [facility] and [facility] Support Groups    [facility]ctions Bariatric Care Support Group?  This is open to all pre- and post- operative bariatric surgery patients as well as their support system.   WHEN: This group meets the 2nd Tuesday of each month from 6:30 PM - 8:00 PM virtually using Microsoft Teams.   FACILITATOR: Led by Daren Kline, Ph.D who is a Licensed Psychologist with the [facility] Comprehensive Weight Management Program.   TO REGISTER: Please send an email to Daren Kline, Ph.D., LP at?[email or web address]?if you would like an invitation to the group and to learn about using Microsoft Teams.    2023 Meetings  January 10: Jet Jackson, PharmD, Pharmacy Resident, \"Medications and Bariatric Surgery\"  February 14:  March 14:  April 11:  May 9:  June 11:    Connections Post-Operative Bariatric Surgery Support Group  This is a support group for [facility] bariatric patients (and those external to [facility]) who have had bariatric surgery and are at least 3 months post-surgery.  WHEN: This support group meets the 4th Wednesday of the month from 11:00 AM - 12:00 PM virtually using Microsoft Teams.   FACILITATOR: Led by Certified Bariatric Nurse, Georgia Abrams RN.   TO REGISTER: Please send an email to Georgia at [email or web address] if " "you would like an invitation to the group and to learn about using Microsoft Teams.    2023 Meetings  January 25  February 22  March 22  April 26  May 24  Ivelisse 28      [facility] Healthy Lifestyle Virtual Support Group    Healthy Lifestyle Virtual Support Group?  This is 60 minutes of small group guided discussion, support and resources. All are welcome who want a healthy lifestyle.  WHEN: This group meets monthly on a Friday from 12:30 PM - 1:30 PM virtually using Microsoft Teams.   FACILITATOR: Led by National Board Certified Health and , Georgia Cosby [facility].   TO REGISTER: Please send an email to Georgia [email or web address] to receive monthly invites to the group or if you have any questions about having a health .  Prior to the meeting, a link with directions on how to join the meeting will be sent to you.    2023 Meetings  January 20: \"Let's Talk\" a time for the group to share  February 17: Guest Speaker, Laura Trinidad RD, Registered Dietician, \"Tips to Maximize Your Metabolism\"  March 17: Let's Talk\" a time for the group to share  April 14: Guest Speaker, Ronda Ferreira RD, Registered Dietician, \"Heart Health\"  May 19: \"Let's Talk\" a time for the group to share  June: To be announced.    Relating To Eating:  - Eat slowly (20-30 minutes per meal), chewing foods well (25 chews per bite/applesauce consistency)  - Focus on eating smaller portion sizes at meals and snacks    Relating to beverages:  - Reduce caffeine/carbonation/calorie containing beverages  - Separate fluids from meals by 30 minutes before, during, and after eating  - Drink 48-64 ounces of fluid per day. Small, frequent sips between meals.    Relating to activity:  Increase activity as able      Protein Sources for Weight Loss  [email or web address]     Carbohydrates  [email or web address]     Mindful Eating  [email or web address]     Diet Guidelines after " Weight Loss Surgery  [email or web address]     Seated Exercises for Arms and Legs (can be done before or after surgery)  [email or web address]    Post-op Diet Handouts:  Diet Guidelines after Weight-loss Surgery  [email or web address]     Your Stage 1 Diet: Clear Liquids  [email or web address]     Your Stage 2 Diet: Low-fat Full Liquids  [email or web address]     Your Stage 3 Diet: Pureed Foods  [email or web address]     Pureed Pleasures  [email or web address]    Your Stage 4 Diet: Soft Foods  [email or web address]    Your Stage 5 Diet: Regular Foods  [email or web address]    Supplements after Sleeve Gastrectomy, Gastric Bypass or Single Anastomosis Duodenal Switch  [email or web address]    Keeping Track of Fluids  [email or web address]

## 2023-02-15 NOTE — PROGRESS NOTES
"Shira Best is a 57 year old female who is being evaluated via a billable video visit.      The patient has been notified of following:     \"This video visit will be conducted via a call between you and your physician/provider. We have found that certain health care needs can be provided without the need for an in-person physical exam.  This service lets us provide the care you need with a video conversation.  If a prescription is necessary we can send it directly to your pharmacy.  If lab work is needed we can place an order for that and you can then stop by our lab to have the test done at a later time.    Video visits are billed at different rates depending on your insurance coverage.  Please reach out to your insurance provider with any questions.    If during the course of the call the physician/provider feels a video visit is not appropriate, you will not be charged for this service.\"    Patient has given verbal consent for Video visit? Yes  How would you like to obtain your AVS? MyChart  If you are dropped from the video visit, the video invite should be resent to: Send to e-mail at: [email or web address]  Will anyone else be joining your video visit? No  {If patient encounters technical issues they should call [phone]      Video-Visit Details    Type of service:  Video Visit    Video Start Time: 4:00 PM  Video End Time: 4:37 PM    Originating Location (pt. Location): Home    Distant Location (provider location):   Offsite (providers home)     Platform used for Video Visit: [employer]    During this virtual visit the patient is located in [place], patient verifies this as the location during the entirety of this visit.       New Bariatric Nutrition Consultation Note    Reason For Visit: Nutrition Assessment    Shira Best is a 57 year old presenting today for new bariatric nutrition consult.   Pt is interested in laparoscopic sleeve gastrectomy with Dr. Higgins expected surgery in [facility].  Patient is " "accompanied by self.  This is pt's first of 3 required nutrition visits prior to surgery.     Pt referred by CONCHIS Tierney on February 15, 2023.  CO-MORBIDITIES OF OBESITY INCLUDE:       12/3/2022   I have the following health issues associated with obesity: Pre-Diabetes, Heart Disease, High Blood Pressure, High Cholesterol, Polycystic Ovarian Syndrome, GERD (Reflux), Osteoarthritis (joint disease)       SUPPORT:  Support System Reviewed With Patient 12/3/2022   Who do you have in your support network that can be available to help you for the first 2 weeks after surgery? Yes my family   Who can you count on for support throughout your weight loss surgery journey? My sister and niece my mom       ANTHROPOMETRICS:  Estimated body mass index is 43.9 kg/m  as calculated from the following:    Height as of 2/3/23: 1.575 m (5' 2\").    Weight as of 2/3/23: 108.9 kg (240 lb).    Required weight loss goal pre-op: -10 lbs from initial consult weight (goal weight 242 lbs or less before surgery)       12/3/2022   I have tried the following methods to lose weight Watching portions or calories, Exercise       Weight Loss Questions Reviewed With Patient 12/3/2022   How long have you been overweight? Since early childhood         NUTRITION HISTORY:  Patient attended [facility] Nutrition Class today.    Recall Diet Questions Reviewed With Patient 12/3/2022   Describe what you typically consume for breakfast (typical or most recent): Bagles, yogurt apple   Describe what you typically consume for lunch (typical or most recent): Moffit, water flavored, bagel apple.   Describe what you typically consume for supper (typical or most recent): Piece of meat, potato, veggie milk   Describe what you typically consume as snacks (typical or most recent): Chips, crackers ,apples, candy, pop, sweetbread,.   How many ounces of water, or other low calorie drinks, do you drink daily (8 oz=1 glass)? 48 oz   How many ounces of caffeine (coffee, " tea, pop) do you drink daily (8 oz=1 glass)? 24 oz   How many ounces of carbonated (pop, beer, sparkling water) drinks do you drinky daily (8 oz=1 glass)? 32 oz   How many ounces of juice, pop, sweet tea, sports drinks, protein drinks, other sweetened drinks, do you drink daily (8 oz=1 glass)? 32 oz   How many ounces of milk do you drink daily (8 oz=1 glass) 8 oz   Please indicate the type of milk: 1%   How often do you drink alcohol? Never       Eating Habits 12/3/2022   Do you have any dietary restrictions? Yes   Do you currently binge eat (eat a large amount of food in a short time)? No   Are you an emotional eater? Yes   Do you get up to eat after falling asleep? No   What foods do you crave? Chocolate salads fruits       Dining Out History Reviewed With Patient 12/3/2022   How often do you dine out? Rarely.   Where do you dine out? (select all that apply) sit-down restaurants   What types of food do you order when you dine out? Pizza, steak, Chinese       Physical Activity Reviewed With Patient 12/3/2022   How often do you exercise? 1 to 2 times per week   What is the duration of your exercise (in minutes)? 30 Minutes   What types of exercise do you do? home gym   What keeps you from being more active?  Pain, I have just had surgery on one or more of my joints       EXERCISE:       12/3/2022   How often do you exercise? 1 to 2 times per week   What is the duration of your exercise (in minutes)? 30 Minutes   What types of exercise do you do? home gym   What keeps you from being more active?  Pain, I have just had surgery on one or more of my joints         NUTRITION DIAGNOSIS:  Obesity r/t long history of positive energy balance aeb BMI >30 kg/m2.    INTERVENTION:  Intervention Provided/Education Provided on post-op diet guidelines, vitamins/minerals essential post-operatively, GI anatomy of bariatric surgeries, ways to help prepare for post-op diet guidelines pre-operatively, portion/calorie-control, mindful  eating and sources of protein.  Patient demonstrates understanding. Provided pt with list of goals RD contact information.      Questions Reviewed With Patient 12/3/2022   How ready are you to make changes regarding your weight? Number 1 = Not ready at all to make changes up to 10 = very ready. 10   How confident are you that you can change? 1 = Not confident that you will be successful making changes up to 10 = very confident. 10       Expected Engagement: good    GOALS:  Relating To Eating:  - Eat slowly (20-30 minutes per meal), chewing foods well (25 chews per bite/applesauce consistency)  - Focus on eating smaller portion sizes at meals and snacks    Relating to beverages:  - Reduce caffeine/carbonation/calorie containing beverages  - Separate fluids from meals by 30 minutes before, during, and after eating  - Drink 48-64 ounces of fluid per day. Small, frequent sips between meals.    Relating to activity:  Increase activity as able      Protein Sources for Weight Loss  [email or web address]     Carbohydrates  [email or web address]     Mindful Eating  [email or web address]     Diet Guidelines after Weight Loss Surgery  [email or web address]     Seated Exercises for Arms and Legs (can be done before or after surgery)  [email or web address]    Post-op Diet Handouts:  Diet Guidelines after Weight-loss Surgery  [email or web address]     Your Stage 1 Diet: Clear Liquids  [email or web address]     Your Stage 2 Diet: Low-fat Full Liquids  [email or web address]     Your Stage 3 Diet: Pureed Foods  [email or web address]     Pureed Pleasures  [email or web address]    Your Stage 4 Diet: Soft Foods  [email or web address]    Your Stage 5 Diet: Regular Foods  [email or web address]    Supplements after Sleeve Gastrectomy, Gastric Bypass or Single Anastomosis Duodenal Switch  [email or web address]    Keeping Track of  Fluids  [email or web address]          Time spent with patient: 37 minutes.  KALYANI BOATENG RD, LD

## 2023-02-16 ENCOUNTER — TELEPHONE (OUTPATIENT)
Dept: GASTROENTEROLOGY | Facility: CLINIC | Age: 58
End: 2023-02-16

## 2023-02-20 ENCOUNTER — VIRTUAL VISIT (OUTPATIENT)
Dept: NEUROPSYCHOLOGY | Facility: CLINIC | Age: 58
End: 2023-02-20
Payer: COMMERCIAL

## 2023-02-20 DIAGNOSIS — Z01.818 PREPROCEDURAL EXAMINATION: Primary | ICD-10-CM

## 2023-02-20 DIAGNOSIS — E66.01 MORBID OBESITY (H): ICD-10-CM

## 2023-02-20 DIAGNOSIS — F54 PSYCHOLOGICAL FACTORS AFFECTING MEDICAL CONDITION: ICD-10-CM

## 2023-02-20 PROCEDURE — 90791 PSYCH DIAGNOSTIC EVALUATION: CPT | Mod: VID

## 2023-02-20 PROCEDURE — 96130 PSYCL TST EVAL PHYS/QHP 1ST: CPT | Mod: VID
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
## 2023-02-27 ENCOUNTER — TELEPHONE (OUTPATIENT)
Dept: GASTROENTEROLOGY | Facility: CLINIC | Age: 58
End: 2023-02-27
Payer: COMMERCIAL

## 2023-02-27 NOTE — TELEPHONE ENCOUNTER
Caller: Ccolleen  Reason for Reschedule/Cancellation (please be detailed, any staff messages or encounters to note?):  not available      Prior to reschedule please review:    Ordering Provider:Gisela    Sedation per order:CS    Does patient have any ASC Exclusions, please identify?: N      Notes on Cancelled Procedure:    Procedure:Upper Endoscopy [EGD]     Date: 3/1/23    Location:[facility]; [address] [place] [address]    Surgeon: Gisela        Rescheduled: Yes    Procedure: Upper Endoscopy [EGD]     Date: 4/12/23    Location:[facility]; [address] [place] [id number]    Surgeon: Gisela    Sedation Level Scheduled  CS,  Reason for Sedation Level Protocol    Prep/Instructions updated and sent: Yes

## 2023-03-21 ENCOUNTER — VIRTUAL VISIT (OUTPATIENT)
Dept: ENDOCRINOLOGY | Facility: CLINIC | Age: 58
End: 2023-03-21

## 2023-03-21 DIAGNOSIS — Z71.3 NUTRITIONAL COUNSELING: Primary | ICD-10-CM

## 2023-03-21 DIAGNOSIS — E66.9 OBESITY: ICD-10-CM

## 2023-03-21 PROCEDURE — 99207 PR NO CHARGE LOS: CPT | Mod: VID | Performed by: DIETITIAN, REGISTERED

## 2023-03-21 PROCEDURE — 97803 MED NUTRITION INDIV SUBSEQ: CPT | Mod: VID | Performed by: DIETITIAN, REGISTERED

## 2023-03-21 NOTE — PATIENT INSTRUCTIONS
Robert Silva!    Follow-up with RD in 1 month    Thank you,    Ronda Ferreira, RD, LD  If you would like to schedule or reschedule an appointment with the RD, please call [phone]    Nutrition Goals  1) Practice  fluids from meals and for 30 minutes after  2) Practice chewing foods thoroughly and taking 20-30 minutes per meal   3) Practice sipping on fluids throughout the day     Goals after surgery:   1. Follow the bariatric post-op diet advancement schedule (see below)  2. Sip on 48-64 oz (or greater) fluids daily, recording intake to help stay on-track.  - Drink at least 1-2 oz of fluid every 15-30 min.  3. Stop vitamins/minerals 1 week before surgery. We will discuss starting a chewable multivitamin at the one week post-op visit.   4. Work towards consuming 60 gm protein daily.     Post-op Diet Advancement Schedule:  Clear Liquid Diet (stage 1):   Low-Fat Full Liquid Diet (stage 2):   Pureed Diet (stage 3):   Soft Diet (stage 4):   Regular Diet (stage 5):     Meal Replacement Shake Options:   *Protein Shake Criteria: no more than 210 Calories, at least 20 grams of protein, and less than 10 grams of sugar   [facility] smoothie (160 Calories, 20 g protein)   Premier Protein (160 Calories, 30 g protein)  Slim Fast Advanced Nutrition (180 Calories, 20 g protein)  Muscle Milk, lactose-free, 17 oz bottle (210 Calories, 30 g protein)  Integrated Supplements, no artificial sugars (110 Calories, 20 g protein)  Genepro, unflavored protein powder (60 Calories, 30 g protein)  Boost/Ensure Max (160 calories, 30 gm protein)   Fairlife Core Power (170 calories, 26 gm protein)  Aldi's Elevation Protein Powder (180 calories, 30 gm protein)       Chewable Multivitamin Options for After Surgery:  Bariatric Advantage Advanced Multi EA chewable: https://www.bariatricadEventKloudage.com/item/chewable-advanced-multi-ea  Take 2 chews daily. Additional calcium citrate supplement needed.  - Discount code for Bariatric Advantage  vitamin/mineral supplements: UOFMINN (for 15% off order)     Celebrate Multi Complete 45: https://Akampus.Sinbad: online travellers club/products/jqxsh-plhqnogh-26?yvdxsov=[email or web address]  Take 2 chews daily. Additional calcium citrate supplement needed.    Midway's Complete, or generic (with 10 mg iron per chew)   (https://www.HubSpot.Sinbad: online travellers club/p/kids-39-complete-multivitamin-chewable-tablets-orange-grape-38-cherry-150ct-up-38-up-8482/-/A-[email or web address]#lnk=sametab)   Take 2 chews per day. Additional B12 and calcium citrate supplements needed. Potential need for additional iron supplement (if needing more than 20 mg iron per day)    Bariatric Fusion: https://www.bariatricfusion.com/collections/bariatric-multivitamins    Take 2 chews twice per day.    Optifast Bariatric Multivitamin  https://www.O-film/optifastr-post-bariatric-chewable-vitamin-and-mineral-supplement.html  Take 2 chews twice per day. Additional iron supplement needed (take at separate time from multivitamins)       Post-op Diet Handouts:  Diet Guidelines after Weight-loss Surgery  [email or web address]     Your Stage 1 Diet: Clear Liquids  [email or web address]     Your Stage 2 Diet: Low-fat Full Liquids  [email or web address]     Your Stage 3 Diet: Pureed Foods  [email or web address]     Pureed Pleasures  [email or web address]    Your Stage 4 Diet: Soft Foods  [email or web address]    Your Stage 5 Diet: Regular Foods  [email or web address]    Supplements after Weight Loss Surgery  [email or web address]     The Plate Method  [email or web address]    Protein Sources for Weight Loss  [email or web address]     Carbohydrates  [email or web address]     Mindful Eating  [email or web address]     Summary of Volumetrics Eating Plan  [email or web address]     Diet Guidelines after Weight Loss Surgery  [email or web address]     Seated Exercises for Arms  and Legs (can be done before or after surgery)  [email or web address]    Interested in working with a health ? Health coaches work with you to improve your overall health and wellbeing. They look at the whole person, and may involve discussion of different areas of life, including, but not limited to the four pillars of health (sleep, exercise, nutrition, and stress management). Discuss with your care team if you would like to start working a health .    Health Coaching-3 Pack:    $99 for three health coaching visits    Visits may be done in person or via phone    Coaching is a partnership between the  and the client; Coaches do not prescribe or diagnose    Coaching helps inspire the client to reach his/her personal goals    COMPREHENSIVE WEIGHT MANAGEMENT PROGRAM  VIRTUAL SUPPORT GROUPS    For Support Group Information:      We offer support groups for patients who are working on weight loss and considering, preparing for or have had weight loss surgery.   There is no cost for this opportunity.  You are invited to attend the?Virtual Support Groups?provided by any of the following locations:    [facility] via [employer] Teams with Gemini Jay RN  2.   [place] via [employer] with Daren Kline, PhD, LP  3.   [place] via [employer] with Georgia Abrams RN  4.   [facility] via [employer] Teams with Georgia Cosby [facility]    The following Support Group information can also be found on our website:  [email or web address]-support-groups    [email or web address]/weight-loss-[email or web address]-loss-surgery-support-groups    [facility] Weight Loss Surgery Support Group    [facility] Weight Loss Surgery Support Group  The support group is a patient-lead forum that meets monthly to share experiences, encouragement and education. It is open to those who have had weight loss surgery, are scheduled  "for surgery, or are considering surgery.   WHEN: This group meets on the 3rd Wednesday of each month from 5:00PM - 6:00PM virtually using Microsoft Teams.   FACILITATOR: Led by Gemini Epperson RD, LD, RN, the program's Clinical Coordinator.   TO REGISTER: Please contact the clinic via [employer] or call the nurse line directly at [phone] to inform our staff that you would like an invite sent to you and to let us know the email you would like the invite sent to. Prior to the meeting, a link with directions on how to join the meeting will be sent to you.    2023 Meetings (speakers to be determined)  January 18: \"Let's Talk\" a time for the group to share.  February 15: \"Let's Talk\" a time for the group to share.  March 15: \"Let's Talk\" a time for the group to share.  April 19: \"Let's Talk\" a time for the group to share.  May 17: \"Let's Talk\" a time for the group to share.  June 21: \"Let's Talk\" a time for the group to share.    [facility] Support Groups    [facility] Bariatric Care Support Group?  This is open to all pre- and post- operative bariatric surgery patients as well as their support system.   WHEN: This group meets the 2nd Tuesday of each month from 6:30 PM - 8:00 PM virtually using Microsoft Teams.   FACILITATOR: Led by Daren Kline, Ph.D who is a Licensed Psychologist with the [facility] Comprehensive Weight Management Program.   TO REGISTER: Please send an email to Daren Kline, Ph.D., LP at?[email or web address]?if you would like an invitation to the group and to learn about using Microsoft Teams.    2023 Meetings  January 10: Jet Jackson, PharmD, Pharmacy Resident, \"Medications and Bariatric Surgery\"  February 14:  March 14:  April 11:  May 9:  June 11:    [facility]ctions Post-Operative Bariatric Surgery Support Group  This is a support group for [facility] bariatric patients (and those external to [facility]) who have had " "bariatric surgery and are at least 3 months post-surgery.  WHEN: This support group meets the 4th Wednesday of the month from 11:00 AM - 12:00 PM virtually using Microsoft Teams.   FACILITATOR: Led by Certified Bariatric Nurse, Georgia Abrams RN.   TO REGISTER: Please send an email to Georgia at [email or web address] if you would like an invitation to the group and to learn about using Microsoft Teams.    2023 Meetings  January 25  February 22  March 22  April 26  May 24  Ivelisse 28      [facility] Healthy Lifestyle Virtual Support Group    Healthy Lifestyle Virtual Support Group?  This is 60 minutes of small group guided discussion, support and resources. All are welcome who want a healthy lifestyle.  WHEN: This group meets monthly on a Friday from 12:30 PM - 1:30 PM virtually using Microsoft Teams.   FACILITATOR: Led by National Board Certified Health and , Georgia Cosby [facility].   TO REGISTER: Please send an email to Georgia [email or web address] to receive monthly invites to the group or if you have any questions about having a health .  Prior to the meeting, a link with directions on how to join the meeting will be sent to you.    2023 Meetings January 20: \"Let's Talk\" a time for the group to share  February 17: Guest Speaker, Laura Trinidad RD, Registered Dietician, \"Tips to Maximize Your Metabolism\"  March 17: Let's Talk\" a time for the group to share  April 14: Guest Speaker, Ronda Ferreira RD, Registered Dietician, \"Heart Health\"  May 19: \"Let's Talk\" a time for the group to share  June: To be announced.      " English

## 2023-03-21 NOTE — PROGRESS NOTES
"Shira Best is a 57 year old female who is being evaluated via a billable video visit.      The patient has been notified of following:     \"This video visit will be conducted via a call between you and your physician/provider. We have found that certain health care needs can be provided without the need for an in-person physical exam.  This service lets us provide the care you need with a video conversation.  If a prescription is necessary we can send it directly to your pharmacy.  If lab work is needed we can place an order for that and you can then stop by our lab to have the test done at a later time.    Video visits are billed at different rates depending on your insurance coverage.  Please reach out to your insurance provider with any questions.    If during the course of the call the physician/provider feels a video visit is not appropriate, you will not be charged for this service.\"    Shira Best is a 57 year old female who is being evaluated via a billable video visit.      The patient has been notified of following:     \"This video visit will be conducted via a call between you and your physician/provider. We have found that certain health care needs can be provided without the need for an in-person physical exam.  This service lets us provide the care you need with a video conversation.  If a prescription is necessary we can send it directly to your pharmacy.  If lab work is needed we can place an order for that and you can then stop by our lab to have the test done at a later time.    Video visits are billed at different rates depending on your insurance coverage.  Please reach out to your insurance provider with any questions.    If during the course of the call the physician/provider feels a video visit is not appropriate, you will not be charged for this service.\"    Patient has given verbal consent for Video visit? Yes  How would you like to obtain your AVS? MyChart  If you are dropped " "from the video visit, the video invite should be resent to: Text to cell phone: [phone]  Will anyone else be joining your video visit? No  {If patient encounters technical issues they should call [phone]      Video-Visit Details    Type of service:  Video Visit    Video Start Time: 3:30 PM  Video End Time: 3:46 PM    Originating Location (pt. Location): Home    Distant Location (provider location):  Offsite (providers home)    Platform used for Video Visit: AppNeta    During this virtual visit the patient is located in [place], patient verifies this as the location during the entirety of this visit.       Return Bariatric Nutrition Consultation Note    Reason For Visit: Nutrition Assessment    Shira Best is a 57 year old presenting today for new bariatric nutrition consult.   Pt is interested in laparoscopic sleeve gastrectomy with Dr. Higgins expected surgery in [facility].  Patient is accompanied by self.  This is pt's second of 3 required nutrition visits prior to surgery.     Pt referred by CONCHIS Brandon on December 6, 2022.  CO-MORBIDITIES OF OBESITY INCLUDE:       12/3/2022   I have the following health issues associated with obesity: Pre-Diabetes, Heart Disease, High Blood Pressure, High Cholesterol, Polycystic Ovarian Syndrome, GERD (Reflux), Osteoarthritis (joint disease)       SUPPORT:  Support System Reviewed With Patient 12/3/2022   Who do you have in your support network that can be available to help you for the first 2 weeks after surgery? Yes my family   Who can you count on for support throughout your weight loss surgery journey? My sister and niece my mom       ANTHROPOMETRICS:  Estimated body mass index is 43.9 kg/m  as calculated from the following:    Height as of 2/3/23: 1.575 m (5' 2\").    Weight as of 2/3/23: 108.9 kg (240 lb).     Current weight: 243 lbs     Required weight loss goal pre-op: -10 lbs from initial consult weight (goal weight 242 lbs or less before surgery)       " 12/3/2022   I have tried the following methods to lose weight Watching portions or calories, Exercise       Weight Loss Questions Reviewed With Patient 12/3/2022   How long have you been overweight? Since early childhood       SUPPLEMENT INFORMATION:  none    NUTRITION HISTORY:  Per CONCHIS Tierney: Would like to go through surgery because she is unable to lose weight like she had in the past. Has become harder to exercise    Working with [facility]- working on portion sizes and decreasing carb portions.     1/17/22: Pt states she lost 10 lbs but regained d/t sciatica and plantar fascitis. Has been eating smaller portions and eating very slowly. Appetite has decreased.     2/15/23: Pt attended S Nutrition Class    3/21/23: Pt doing well- continues to lose weight. Focused on protein and fluids. EGD scheduled with Dr. Higgins on April 12th.     Barriers to lifestyle change: Difficulty exercising, previously failed weight loss attempts     Recall Diet Questions Reviewed With Patient 12/3/2022   Describe what you typically consume for breakfast (typical or most recent): Bagles, yogurt apple   Describe what you typically consume for lunch (typical or most recent): [place], water flavored, bagel apple.   Describe what you typically consume for supper (typical or most recent): Piece of meat, potato, veggie milk   Describe what you typically consume as snacks (typical or most recent): Chips, crackers ,apples, candy, pop, sweetbread,.   How many ounces of water, or other low calorie drinks, do you drink daily (8 oz=1 glass)? 48 oz   How many ounces of caffeine (coffee, tea, pop) do you drink daily (8 oz=1 glass)? 24 oz   How many ounces of carbonated (pop, beer, sparkling water) drinks do you drinky daily (8 oz=1 glass)? 32 oz   How many ounces of juice, pop, sweet tea, sports drinks, protein drinks, other sweetened drinks, do you drink daily (8 oz=1 glass)? 32 oz   How many ounces of milk do you drink daily (8  "oz=1 glass) 8 oz   Please indicate the type of milk: 1%   How often do you drink alcohol? Never       Eating Habits 12/3/2022   Do you have any dietary restrictions? Yes   Do you currently binge eat (eat a large amount of food in a short time)? No   Are you an emotional eater? Yes   Do you get up to eat after falling asleep? No   What foods do you crave? Chocolate salads fruits       Dining Out History Reviewed With Patient 12/3/2022   How often do you dine out? Rarely.   Where do you dine out? (select all that apply) sit-down restaurants   What types of food do you order when you dine out? Pizza, steak, Chinese         EXERCISE:       12/3/2022   How often do you exercise? 1 to 2 times per week   What is the duration of your exercise (in minutes)? 30 Minutes   What types of exercise do you do? home gym   What keeps you from being more active?  Pain, I have just had surgery on one or more of my joints     Progress on Previous Goals  Relating To Eatin) Eat slowly (20-30 minutes per meal), chewing foods well (25 chews per bite/applesauce consistency) met, continues  2) 9\" Plate method (1/2 plate non-starchy vegetables/fruit, 1/4 plate lean protein, 1/4 plate whole grain starch - no more than 1/2 cup carb/meal) met, continues     Relating to beverages:  1) Separate fluids from meals and for 30 minutes after met, continues    NUTRITION DIAGNOSIS:  Obesity r/t long history of positive energy balance aeb BMI >30 kg/m2.    INTERVENTION:  Intervention Provided/Education Provided on post-op diet guidelines, vitamins/minerals essential post-operatively, GI anatomy of bariatric surgeries, ways to help prepare for post-op diet guidelines pre-operatively, portion/calorie-control, mindful eating and sources of protein.  Patient demonstrates understanding.     Personal barriers to making and continuing required life changes have been identified, and strategies to overcome those barriers have been recommended AND family and " social supports have been assessed and strategies to strengthen those supports have been recommended.    Provided pt with list of goals, RD contact information and resources listed below via [employer].         Questions Reviewed With Patient 12/3/2022   How ready are you to make changes regarding your weight? Number 1 = Not ready at all to make changes up to 10 = very ready. 10   How confident are you that you can change? 1 = Not confident that you will be successful making changes up to 10 = very confident. 10       Expected Engagement: good    GOALS:  1) Practice  fluids from meals and for 30 minutes after  2) Practice chewing foods thoroughly and taking 20-30 minutes per meal   3) Practice sipping on fluids throughout the day     Goals after surgery:   1. Follow the bariatric post-op diet advancement schedule (see below)  2. Sip on 48-64 oz (or greater) fluids daily, recording intake to help stay on-track.  - Drink at least 1-2 oz of fluid every 15-30 min.  3. Stop vitamins/minerals 1 week before surgery. We will discuss starting a chewable multivitamin at the one week post-op visit.   4. Work towards consuming 60 gm protein daily.     Post-op Diet Advancement Schedule:  Clear Liquid Diet (stage 1):   Low-Fat Full Liquid Diet (stage 2):   Pureed Diet (stage 3):   Soft Diet (stage 4):   Regular Diet (stage 5):     Meal Replacement Shake Options:   *Protein Shake Criteria: no more than 210 Calories, at least 20 grams of protein, and less than 10 grams of sugar   [facility] smoothie (160 Calories, 20 g protein)   Premier Protein (160 Calories, 30 g protein)  Slim Fast Advanced Nutrition (180 Calories, 20 g protein)  Muscle Milk, lactose-free, 17 oz bottle (210 Calories, 30 g protein)  Integrated Supplements, no artificial sugars (110 Calories, 20 g protein)  Genepro, unflavored protein powder (60 Calories, 30 g protein)  Boost/Ensure Max (160 calories, 30 gm protein)   adSage Core Power (170 calories, 26  gm protein)  Aldi's Elevation Protein Powder (180 calories, 30 gm protein)       Chewable Multivitamin Options for After Surgery:  Bariatric Advantage Advanced Multi EA chewable: https://www.bariatricadZenph Sound Innovationsage.com/item/chewable-advanced-multi-ea  Take 2 chews daily. Additional calcium citrate supplement needed.  - Discount code for Bariatric Advantage vitamin/mineral supplements: UOFMINN (for 15% off order)     ADR [employer] ConceptsraAtlantia Search Multi Complete 45: [email or web address]  Take 2 chews daily. Additional calcium citrate supplement needed.    Burnet's Complete, or generic (with 10 mg iron per chew)   (https://[email or web address].Idibon/p/kids-39-complete-multivitamin-chewable-tablets-orange-grape-38-cherry-150ct-up-38-up-8482/-/[email or web address]#lnk=sametab)   Take 2 chews per day. Additional B12 and calcium citrate supplements needed. Potential need for additional iron supplement (if needing more than 20 mg iron per day)    Bariatric Fusion: https://www.bariatricfusion.com/collections/bariatric-multivitamins    Take 2 chews twice per day.    Optifast Bariatric Multivitamin  https://www.[employer].Idibon/optifastr-post-bariatric-chewable-vitamin-and-mineral-supplement.html  Take 2 chews twice per day. Additional iron supplement needed (take at separate time from multivitamins)       Post-op Diet Handouts:  Diet Guidelines after Weight-loss Surgery  [email or web address]     Your Stage 1 Diet: Clear Liquids  [email or web address]     Your Stage 2 Diet: Low-fat Full Liquids  [email or web address]     Your Stage 3 Diet: Pureed Foods  [email or web address]     Pureed Pleasures  [email or web address]    Your Stage 4 Diet: Soft Foods  [email or web address]    Your Stage 5 Diet: Regular Foods  [email or web address]    Supplements after Weight Loss Surgery  [email or web address]     The Plate  Method  [email or web address]    Protein Sources for Weight Loss  [email or web address]     Carbohydrates  [email or web address]     Mindful Eating  [email or web address]     Summary of Volumetrics Eating Plan  [email or web address]     Diet Guidelines after Weight Loss Surgery  [email or web address]     Seated Exercises for Arms and Legs (can be done before or after surgery)  [email or web address]    Follow up: 1 month, prn    Time spent with patient: 16 minutes.  KALYANI BOATENG RD, LD

## 2023-03-21 NOTE — LETTER
"3/21/2023       RE: Shira Best  [address] [facility] [address]     Dear Colleague,    Thank you for referring your patient, Shira Best, to the [facility] [place] [facility]. Please see a copy of my visit note below.    Shira Best is a 57 year old female who is being evaluated via a billable video visit.      The patient has been notified of following:     \"This video visit will be conducted via a call between you and your physician/provider. We have found that certain health care needs can be provided without the need for an in-person physical exam.  This service lets us provide the care you need with a video conversation.  If a prescription is necessary we can send it directly to your pharmacy.  If lab work is needed we can place an order for that and you can then stop by our lab to have the test done at a later time.    Video visits are billed at different rates depending on your insurance coverage.  Please reach out to your insurance provider with any questions.    If during the course of the call the physician/provider feels a video visit is not appropriate, you will not be charged for this service.\"    Shira Best is a 57 year old female who is being evaluated via a billable video visit.      The patient has been notified of following:     \"This video visit will be conducted via a call between you and your physician/provider. We have found that certain health care needs can be provided without the need for an in-person physical exam.  This service lets us provide the care you need with a video conversation.  If a prescription is necessary we can send it directly to your pharmacy.  If lab work is needed we can place an order for that and you can then stop by our lab to have the test done at a later time.    Video visits are billed at different rates depending on your insurance " "coverage.  Please reach out to your insurance provider with any questions.    If during the course of the call the physician/provider feels a video visit is not appropriate, you will not be charged for this service.\"    Patient has given verbal consent for Video visit? Yes  How would you like to obtain your AVS? MyChart  If you are dropped from the video visit, the video invite should be resent to: Text to cell phone: [phone]  Will anyone else be joining your video visit? No  {If patient encounters technical issues they should call [phone]      Video-Visit Details    Type of service:  Video Visit    Video Start Time: 3:30 PM  Video End Time: 3:46 PM    Originating Location (pt. Location): Home    Distant Location (provider location):  Offsite (providers home)    Platform used for Video Visit: Appurify    During this virtual visit the patient is located in [place], patient verifies this as the location during the entirety of this visit.       Return Bariatric Nutrition Consultation Note    Reason For Visit: Nutrition Assessment    Shira Best is a 57 year old presenting today for new bariatric nutrition consult.   Pt is interested in laparoscopic sleeve gastrectomy with Dr. Higgins expected surgery in [facility].  Patient is accompanied by self.  This is pt's second of 3 required nutrition visits prior to surgery.     Pt referred by CONCHIS Brandon on December 6, 2022.  CO-MORBIDITIES OF OBESITY INCLUDE:       12/3/2022   I have the following health issues associated with obesity: Pre-Diabetes, Heart Disease, High Blood Pressure, High Cholesterol, Polycystic Ovarian Syndrome, GERD (Reflux), Osteoarthritis (joint disease)       SUPPORT:  Support System Reviewed With Patient 12/3/2022   Who do you have in your support network that can be available to help you for the first 2 weeks after surgery? Yes my family   Who can you count on for support throughout your weight loss surgery journey? My sister and niece my " "mom       ANTHROPOMETRICS:  Estimated body mass index is 43.9 kg/m  as calculated from the following:    Height as of 2/3/23: 1.575 m (5' 2\").    Weight as of 2/3/23: 108.9 kg (240 lb).     Current weight: 243 lbs     Required weight loss goal pre-op: -10 lbs from initial consult weight (goal weight 242 lbs or less before surgery)       12/3/2022   I have tried the following methods to lose weight Watching portions or calories, Exercise       Weight Loss Questions Reviewed With Patient 12/3/2022   How long have you been overweight? Since early childhood       SUPPLEMENT INFORMATION:  none    NUTRITION HISTORY:  Per Neyda Campos PA: Would like to go through surgery because she is unable to lose weight like she had in the past. Has become harder to exercise    Working with [facility] RD- working on portion sizes and decreasing carb portions.     1/17/22: Pt states she lost 10 lbs but regained d/t sciatica and plantar fascitis. Has been eating smaller portions and eating very slowly. Appetite has decreased.     2/15/23: Pt attended [facility] Nutrition Class    3/21/23: Pt doing well- continues to lose weight. Focused on protein and fluids. EGD scheduled with Dr. Higgins on April 12th.     Barriers to lifestyle change: Difficulty exercising, previously failed weight loss attempts     Recall Diet Questions Reviewed With Patient 12/3/2022   Describe what you typically consume for breakfast (typical or most recent): Bagles, yogurt apple   Describe what you typically consume for lunch (typical or most recent): [place], water flavored, bagel apple.   Describe what you typically consume for supper (typical or most recent): Piece of meat, potato, veggie milk   Describe what you typically consume as snacks (typical or most recent): Chips, crackers ,apples, candy, pop, sweetbread,.   How many ounces of water, or other low calorie drinks, do you drink daily (8 oz=1 glass)? 48 oz   How many ounces of caffeine (coffee, tea, pop) do " "you drink daily (8 oz=1 glass)? 24 oz   How many ounces of carbonated (pop, beer, sparkling water) drinks do you drinky daily (8 oz=1 glass)? 32 oz   How many ounces of juice, pop, sweet tea, sports drinks, protein drinks, other sweetened drinks, do you drink daily (8 oz=1 glass)? 32 oz   How many ounces of milk do you drink daily (8 oz=1 glass) 8 oz   Please indicate the type of milk: 1%   How often do you drink alcohol? Never       Eating Habits 12/3/2022   Do you have any dietary restrictions? Yes   Do you currently binge eat (eat a large amount of food in a short time)? No   Are you an emotional eater? Yes   Do you get up to eat after falling asleep? No   What foods do you crave? Chocolate salads fruits       Dining Out History Reviewed With Patient 12/3/2022   How often do you dine out? Rarely.   Where do you dine out? (select all that apply) sit-down restaurants   What types of food do you order when you dine out? tenzin Vizcarra, Chinese         EXERCISE:       12/3/2022   How often do you exercise? 1 to 2 times per week   What is the duration of your exercise (in minutes)? 30 Minutes   What types of exercise do you do? home gym   What keeps you from being more active?  Pain, I have just had surgery on one or more of my joints     Progress on Previous Goals  Relating To Eatin) Eat slowly (20-30 minutes per meal), chewing foods well (25 chews per bite/applesauce consistency) met, continues  2) 9\" Plate method (1/2 plate non-starchy vegetables/fruit, 1/4 plate lean protein, 1/4 plate whole grain starch - no more than 1/2 cup carb/meal) met, continues     Relating to beverages:  1) Separate fluids from meals and for 30 minutes after met, continues    NUTRITION DIAGNOSIS:  Obesity r/t long history of positive energy balance aeb BMI >30 kg/m2.    INTERVENTION:  Intervention Provided/Education Provided on post-op diet guidelines, vitamins/minerals essential post-operatively, GI anatomy of bariatric surgeries, " ways to help prepare for post-op diet guidelines pre-operatively, portion/calorie-control, mindful eating and sources of protein.  Patient demonstrates understanding.     Personal barriers to making and continuing required life changes have been identified, and strategies to overcome those barriers have been recommended AND family and social supports have been assessed and strategies to strengthen those supports have been recommended.    Provided pt with list of goals, RD contact information and resources listed below via HiWi[employer].         Questions Reviewed With Patient 12/3/2022   How ready are you to make changes regarding your weight? Number 1 = Not ready at all to make changes up to 10 = very ready. 10   How confident are you that you can change? 1 = Not confident that you will be successful making changes up to 10 = very confident. 10       Expected Engagement: good    GOALS:  1) Practice  fluids from meals and for 30 minutes after  2) Practice chewing foods thoroughly and taking 20-30 minutes per meal   3) Practice sipping on fluids throughout the day     Goals after surgery:   1. Follow the bariatric post-op diet advancement schedule (see below)  2. Sip on 48-64 oz (or greater) fluids daily, recording intake to help stay on-track.  - Drink at least 1-2 oz of fluid every 15-30 min.  3. Stop vitamins/minerals 1 week before surgery. We will discuss starting a chewable multivitamin at the one week post-op visit.   4. Work towards consuming 60 gm protein daily.     Post-op Diet Advancement Schedule:  Clear Liquid Diet (stage 1):   Low-Fat Full Liquid Diet (stage 2):   Pureed Diet (stage 3):   Soft Diet (stage 4):   Regular Diet (stage 5):     Meal Replacement Shake Options:   *Protein Shake Criteria: no more than 210 Calories, at least 20 grams of protein, and less than 10 grams of sugar   [facility] smoothie (160 Calories, 20 g protein)   Premier Protein (160 Calories, 30 g protein)  Slim Fast  Advanced Nutrition (180 Calories, 20 g protein)  Muscle Milk, lactose-free, 17 oz bottle (210 Calories, 30 g protein)  Integrated Supplements, no artificial sugars (110 Calories, 20 g protein)  Genepro, unflavored protein powder (60 Calories, 30 g protein)  Boost/Ensure Max (160 calories, 30 gm protein)   Fairlife Core Power (170 calories, 26 gm protein)  Aldi's Elevation Protein Powder (180 calories, 30 gm protein)       Chewable Multivitamin Options for After Surgery:  Bariatric Advantage Advanced Multi EA chewable: https://www.bariatricMezzobit.Skiipi/item/chewable-advanced-multi-ea  Take 2 chews daily. Additional calcium citrate supplement needed.  - Discount code for Bariatric Advantage vitamin/mineral supplements: UOFMINN (for 15% off order)     [employer] Multi Complete 45: https://Suo Yi/products/xnbgg-hvczrwwk-82?usqcfqa=[email or web address]  Take 2 chews daily. Additional calcium citrate supplement needed.    Rosharon's Complete, or generic (with 10 mg iron per chew)   (https://www.AllergEase.Skiipi/p/kids-39-complete-multivitamin-chewable-tablets-orange-grape-38-cherry-150ct-up-38-up-8482/-/[email or web address]#lnk=sametab)   Take 2 chews per day. Additional B12 and calcium citrate supplements needed. Potential need for additional iron supplement (if needing more than 20 mg iron per day)    Bariatric Fusion: https://www.bariatricfusion.com/collections/bariatric-multivitamins    Take 2 chews twice per day.    Optifast Bariatric Multivitamin  https://www.Skeeble.Skiipi/optifastr-post-bariatric-chewable-vitamin-and-mineral-supplement.html  Take 2 chews twice per day. Additional iron supplement needed (take at separate time from multivitamins)       Post-op Diet Handouts:  Diet Guidelines after Weight-loss Surgery  [email or web address]     Your Stage 1 Diet: Clear Liquids  [email or web address]     Your Stage 2 Diet: Low-fat Full Liquids  [email or web address]     Your Stage 3 Diet:  Pureed Foods  [email or web address]     Pureed Pleasures  [email or web address]    Your Stage 4 Diet: Soft Foods  [email or web address]    Your Stage 5 Diet: Regular Foods  [email or web address]    Supplements after Weight Loss Surgery  [email or web address]     The Plate Method  [email or web address]    Protein Sources for Weight Loss  [email or web address]     Carbohydrates  [email or web address]     Mindful Eating  [email or web address]     Summary of Volumetrics Eating Plan  [email or web address]     Diet Guidelines after Weight Loss Surgery  [email or web address]     Seated Exercises for Arms and Legs (can be done before or after surgery)  [email or web address]    Follow up: 1 month, prn    Time spent with patient: 16 minutes.  KALYANI BOATENG RD, LD

## 2023-03-22 ENCOUNTER — MYC MEDICAL ADVICE (OUTPATIENT)
Dept: ENDOCRINOLOGY | Facility: CLINIC | Age: 58
End: 2023-03-22

## 2023-03-22 ENCOUNTER — VIRTUAL VISIT (OUTPATIENT)
Dept: ENDOCRINOLOGY | Facility: CLINIC | Age: 58
End: 2023-03-22
Payer: COMMERCIAL

## 2023-03-22 VITALS — BODY MASS INDEX: 42.88 KG/M2 | WEIGHT: 233 LBS | HEIGHT: 62 IN

## 2023-03-22 DIAGNOSIS — E66.01 CLASS 3 SEVERE OBESITY WITH SERIOUS COMORBIDITY AND BODY MASS INDEX (BMI) OF 45.0 TO 49.9 IN ADULT, UNSPECIFIED OBESITY TYPE (H): Primary | ICD-10-CM

## 2023-03-22 DIAGNOSIS — E66.813 CLASS 3 SEVERE OBESITY WITH SERIOUS COMORBIDITY AND BODY MASS INDEX (BMI) OF 45.0 TO 49.9 IN ADULT, UNSPECIFIED OBESITY TYPE (H): Primary | ICD-10-CM

## 2023-03-22 PROCEDURE — 99214 OFFICE O/P EST MOD 30 MIN: CPT | Mod: GT

## 2023-03-22 NOTE — LETTER
3/22/2023       RE: Shira Best  [address] [facility] [address]     Dear Colleague,    Thank you for referring your patient, Shira Best, to the [facility] [place] [facility]. Please see a copy of my visit note below.    Shira is a 57 year old who is being evaluated via a billable video visit.      How would you like to obtain your AVS? MyChart  If the video visit is dropped, the invitation should be resent by: Text to cell phone: [phone]  Will anyone else be joining your video visit? No        Video-Visit Details    Type of service:  Video Visit     Originating Location (pt. Location): Other [place]    Distant Location (provider location):  Off-site  Platform used for Video Visit: Well          Pre-Bariatric Surgery Note    Karla Roberto    Date: 3/22/2023     RE: Shira Best    MR#: [id number]   : 1965   Date of Visit: Mar 22, 2023    REASON FOR VISIT: Preoperative evaluation for possible weight loss surgery    Dear Karla Lyon,    I had the pleasure of seeing your patient, Shira Best, in my preoperative bariatric clinic.    As you know, she has morbid obesity and is considering weight loss surgery to treat obesity in association with her medical conditions of obesity.  Her consult weight was 252.  She has lost 19 pounds since her consult weight. She has met her required pre-surgery weight. Please refer to initial consult note from date 2023 for patient's weight history and co-morbidities.    Assessment & Plan   Problem List Items Addressed This Visit        Digestive    Class 3 severe obesity with serious comorbidity and body mass index (BMI) of 45.0 to 49.9 in adult (H) - Primary      1. Clearance needed - PCP, therapist, pulmonology, neurology   2. After EGD discuss with Dr. Higgins - gastric bypass vs. Sleeve and emgality   3. Lauren Bloch, MTM  Pharmacist 1 month prior to surgery   4. Follow up with Neyda Toro in 3 months       Reviewed tasklist with patient   EGD - scheduled 4/12/2023 due to history of GERD and possible ulcer   PCP - needed   Therapy - needed   Cardiology - completed - 1/5/2023   Pulmonology - needed  Neurology - needed   Hematology - completed, gave post op guidance of Lovenox 60mg BID for 4 weeks post op.   Dr. Higgins - wanted EGD due to possible history of ulcer. Did not discuss bypass vs. sleeve  Psych Eval - completed 2/20/2023  Goal weight 242lb - met   RD - has completed 3, montly until surgery       Still undecided on bypass vs sleeve     Has been exercising more.     Has been working on portion control. No longer eating after 7pm. Eating more fruits and vegtables.         Most recent weights:  Wt Readings from Last 4 Encounters:   03/22/23 105.7 kg (233 lb)   02/03/23 108.9 kg (240 lb)   01/12/23 110.2 kg (243 lb)   12/05/22 114.5 kg (252 lb 8 oz)         ROS    No past medical history on file.    No past surgical history on file.    Current Outpatient Medications   Medication     acetaminophen (TYLENOL) 325 MG tablet     albuterol (PROAIR HFA/PROVENTIL HFA/VENTOLIN HFA) 108 (90 Base) MCG/ACT inhaler     aspirin (ASA) 81 MG chewable tablet     Budeson-Glycopyrrol-Formoterol (BREZTRI AEROSPHERE) 160-9-4.8 MCG/ACT AERO     busPIRone (BUSPAR) 10 MG tablet     butalbital-acetaminophen-caffeine (ESGIC) -40 MG tablet     famotidine (PEPCID) 40 MG tablet     gabapentin (NEURONTIN) 300 MG capsule     galcanezumab-gnlm (EMGALITY) 120 MG/ML injection     JARDIANCE 10 MG TABS tablet     magnesium 250 MG tablet     metFORMIN (GLUCOPHAGE XR) 500 MG 24 hr tablet     metoprolol succinate ER (TOPROL XL) 25 MG 24 hr tablet     nystatin (MYCOSTATIN) 758085 UNIT/GM external powder     oxyCODONE (ROXICODONE) 5 MG tablet     pantoprazole (PROTONIX) 40 MG EC tablet     potassium chloride ER (K-TAB/KLOR-CON) 10 MEQ CR tablet     prochlorperazine  "(COMPAZINE) 5 MG tablet     riboflavin 100 MG CAPS     rosuvastatin (CRESTOR) 40 MG tablet     sotalol (BETAPACE) 80 MG tablet     spironolactone (ALDACTONE) 25 MG tablet     topiramate (TOPAMAX) 100 MG tablet     traZODone (DESYREL) 150 MG tablet     venlafaxine (EFFEXOR) 50 MG tablet     benzonatate (TESSALON) 200 MG capsule     furosemide (LASIX) 20 MG tablet     No current facility-administered medications for this visit.       Allergies   Allergen Reactions     Amitriptyline      Tongue swells up and gets little dots on tongue     Amoxicillin      Sore tongue (no problems with Amoxicillin)  ++ has tolerated cephalexin and cefazolin ++     Sulindac      Lip swelling  3-7-17: Has tolerated other NSAIDs         Objective     Ht 1.575 m (5' 2\")   Wt 105.7 kg (233 lb)   BMI 42.62 kg/m           Vitals:  No vitals were obtained today due to virtual visit.    Physical Exam   GENERAL: Healthy, alert and no distress  EYES: Eyes grossly normal to inspection.  No discharge or erythema, or obvious scleral/conjunctival abnormalities.  RESP: No audible wheeze, cough, or visible cyanosis.  No visible retractions or increased work of breathing.    SKIN: Visible skin clear. No significant rash, abnormal pigmentation or lesions.  NEURO: Cranial nerves grossly intact.  Mentation and speech appropriate for age.  PSYCH: Mentation appears normal, affect normal/bright, judgement and insight intact, normal speech and appearance well-groomed.    Sleeve Gastrectomy: Risks and Side Effects    The complications or risks of surgery include but are not limited to: death, heart attack, infection in the surgical site (wound infection), abdomen (abscess), bladder (urinary tract infection), lungs (pneumonia), clots in legs (deep vein thrombosis) or lungs (pulmonary emboli),  injury to the bowels or other organs, bowel obstruction, hernia at the incision and gastrointestional bleeding.    More specific risks related to vertical sleeve " gastrectomy were detailed at the bariatric informational seminar and include the following: leak at the vertical sleeve staple line, leak at the anastomoses,  nausea, vomiting, and dehydration for several months,  adhesions causing bowel obstruction, rapid weight loss causing a higher rate of gallstone formation during the first 6 months after surgery, decreased absorption of vitamins and protein because of the reduced stomach size, weight regain if inappropriate food intake occurs, stricture, injury to other organs, hernia,  and ulcers.       Side effects of bariatric surgery include but are not limited to: abdominal pain, cramping, bloating, constipation, nausea, vomiting, diarrhea, difficulty swallowing,  dehydration, hair loss, excess skin, protein, iron and vitamin deficiencies, heartburn, transfer of addictions, increased anxiety and worsening depression.       I emphasized exercise and activity behavior along with appropriate food choice as the main foundation for weight loss with surgery providing surgical reinforcement of the appropriate behavior set.        Review of general surgery weight loss process    1. Complete preoperative requirements, including weight loss.  Final weight check to confirm MANDATORY weight loss requirement must be documented on a clinic scale.    2. Discuss prior authorization with .    3. History and physical evaluation by PCP of PAC clinic within 30 days of surgery date, preoperative class, and weight check (weigh-in visit) to be scheduled by patient.  Pre-anesthesia clinic for risk evaluation to be scheduled by anesthesia clinic.    4. We cannot guarantee that patient will qualify for surgery unless all preoperative requirements are met, prior authorization from primary insurance company is granted, and insurance changes do not occur.    5. It is possible for patients to regain all weight after weight loss surgery unless they follow guidelines prescribed by  our bariatric center.    6. All patients with gastrointestinal complaints after weight loss surgery must have complaints conveyed to the bariatric team for appropriate treatment.    7. Vitamin deficiencies may develop post-bariatric surgery and annual laboratory testing should be performed.    8. Persistent nausea/vomiting after bariatric surgery entails risk of thiamine deficiency and should be treated early.  Vitamin B12 deficiency may develop, especially after gastric bypass surgery and must be recognized.        If you have any questions about our plans please don't hesitate to contact me.    Sincerely,    BHANU TRONCOSO PA-C      37 minutes spent on the date of the encounter doing chart review, history and exam, documentation and further activities per the note

## 2023-03-22 NOTE — NURSING NOTE
Chief Complaint   Patient presents with     Follow Up     Return weight management.         Vitals:    03/22/23 1303   Weight: 233 lb       Body mass index is 42.62 kg/m .      Mariaa Spring, EMT  Surgery Clinic

## 2023-03-22 NOTE — PROGRESS NOTES
Shira is a 57 year old who is being evaluated via a billable video visit.      How would you like to obtain your AVS? MyChart  If the video visit is dropped, the invitation should be resent by: Text to cell phone: [phone]  Will anyone else be joining your video visit? No        Video-Visit Details    Type of service:  Video Visit     Originating Location (pt. Location): Other [place] over [place]    Distant Location (provider location):  Off-site  Platform used for Video Visit: Well          Pre-Bariatric Surgery Note    Karla Roberto    Date: 3/22/2023     RE: Shira Best    MR#: [id number]   : 1965   Date of Visit: Mar 22, 2023    REASON FOR VISIT: Preoperative evaluation for possible weight loss surgery    Dear Karla Lyon,    I had the pleasure of seeing your patient, Shira Best, in my preoperative bariatric clinic.    As you know, she has morbid obesity and is considering weight loss surgery to treat obesity in association with her medical conditions of obesity.  Her consult weight was 252.  She has lost 19 pounds since her consult weight. She has met her required pre-surgery weight. Please refer to initial consult note from date 2023 for patient's weight history and co-morbidities.    Assessment & Plan   Problem List Items Addressed This Visit        Digestive    Class 3 severe obesity with serious comorbidity and body mass index (BMI) of 45.0 to 49.9 in adult (H) - Primary      1. Clearance needed - PCP, therapist, pulmonology, neurology   2. After EGD discuss with Dr. Higgins - gastric bypass vs. Sleeve and emgality   3. Lauren Bloch, [facility] Pharmacist 1 month prior to surgery   4. Follow up with Neyda Toro in 3 months       Reviewed tasklist with patient   EGD - scheduled 2023 due to history of GERD and possible ulcer   PCP - needed   Therapy - needed   Cardiology - completed - 2023   Pulmonology - needed  Neurology - needed   Hematology - completed, gave  post op guidance of Lovenox 60mg BID for 4 weeks post op.   Dr. Higgins - wanted EGD due to possible history of ulcer. Did not discuss bypass vs. sleeve  Psych Eval - completed 2/20/2023  Goal weight 242lb - met   RD - has completed 3, montly until surgery       Still undecided on bypass vs sleeve     Has been exercising more.     Has been working on portion control. No longer eating after 7pm. Eating more fruits and vegtables.         Most recent weights:  Wt Readings from Last 4 Encounters:   03/22/23 105.7 kg (233 lb)   02/03/23 108.9 kg (240 lb)   01/12/23 110.2 kg (243 lb)   12/05/22 114.5 kg (252 lb 8 oz)         ROS    No past medical history on file.    No past surgical history on file.    Current Outpatient Medications   Medication     acetaminophen (TYLENOL) 325 MG tablet     albuterol (PROAIR HFA/PROVENTIL HFA/VENTOLIN HFA) 108 (90 Base) MCG/ACT inhaler     aspirin (ASA) 81 MG chewable tablet     Budeson-Glycopyrrol-Formoterol (BREZTRI AEROSPHERE) 160-9-4.8 MCG/ACT AERO     busPIRone (BUSPAR) 10 MG tablet     butalbital-acetaminophen-caffeine (ESGIC) -40 MG tablet     famotidine (PEPCID) 40 MG tablet     gabapentin (NEURONTIN) 300 MG capsule     galcanezumab-gnlm (EMGALITY) 120 MG/ML injection     JARDIANCE 10 MG TABS tablet     magnesium 250 MG tablet     metFORMIN (GLUCOPHAGE XR) 500 MG 24 hr tablet     metoprolol succinate ER (TOPROL XL) 25 MG 24 hr tablet     nystatin (MYCOSTATIN) 544217 UNIT/GM external powder     oxyCODONE (ROXICODONE) 5 MG tablet     pantoprazole (PROTONIX) 40 MG EC tablet     potassium chloride ER (K-TAB/KLOR-CON) 10 MEQ CR tablet     prochlorperazine (COMPAZINE) 5 MG tablet     riboflavin 100 MG CAPS     rosuvastatin (CRESTOR) 40 MG tablet     sotalol (BETAPACE) 80 MG tablet     spironolactone (ALDACTONE) 25 MG tablet     topiramate (TOPAMAX) 100 MG tablet     traZODone (DESYREL) 150 MG tablet     venlafaxine (EFFEXOR) 50 MG tablet     benzonatate (TESSALON) 200 MG capsule  "    furosemide (LASIX) 20 MG tablet     No current facility-administered medications for this visit.       Allergies   Allergen Reactions     Amitriptyline      Tongue swells up and gets little dots on tongue     Amoxicillin      Sore tongue (no problems with Amoxicillin)  ++ has tolerated cephalexin and cefazolin ++     Sulindac      Lip swelling  3-7-17: Has tolerated other NSAIDs         Objective    Ht 1.575 m (5' 2\")   Wt 105.7 kg (233 lb)   BMI 42.62 kg/m           Vitals:  No vitals were obtained today due to virtual visit.    Physical Exam   GENERAL: Healthy, alert and no distress  EYES: Eyes grossly normal to inspection.  No discharge or erythema, or obvious scleral/conjunctival abnormalities.  RESP: No audible wheeze, cough, or visible cyanosis.  No visible retractions or increased work of breathing.    SKIN: Visible skin clear. No significant rash, abnormal pigmentation or lesions.  NEURO: Cranial nerves grossly intact.  Mentation and speech appropriate for age.  PSYCH: Mentation appears normal, affect normal/bright, judgement and insight intact, normal speech and appearance well-groomed.    Sleeve Gastrectomy: Risks and Side Effects    The complications or risks of surgery include but are not limited to: death, heart attack, infection in the surgical site (wound infection), abdomen (abscess), bladder (urinary tract infection), lungs (pneumonia), clots in legs (deep vein thrombosis) or lungs (pulmonary emboli),  injury to the bowels or other organs, bowel obstruction, hernia at the incision and gastrointestional bleeding.    More specific risks related to vertical sleeve gastrectomy were detailed at the bariatric informational seminar and include the following: leak at the vertical sleeve staple line, leak at the anastomoses,  nausea, vomiting, and dehydration for several months,  adhesions causing bowel obstruction, rapid weight loss causing a higher rate of gallstone formation during the first 6 " months after surgery, decreased absorption of vitamins and protein because of the reduced stomach size, weight regain if inappropriate food intake occurs, stricture, injury to other organs, hernia,  and ulcers.       Side effects of bariatric surgery include but are not limited to: abdominal pain, cramping, bloating, constipation, nausea, vomiting, diarrhea, difficulty swallowing,  dehydration, hair loss, excess skin, protein, iron and vitamin deficiencies, heartburn, transfer of addictions, increased anxiety and worsening depression.       I emphasized exercise and activity behavior along with appropriate food choice as the main foundation for weight loss with surgery providing surgical reinforcement of the appropriate behavior set.        Review of general surgery weight loss process    1. Complete preoperative requirements, including weight loss.  Final weight check to confirm MANDATORY weight loss requirement must be documented on a clinic scale.    2. Discuss prior authorization with .    3. History and physical evaluation by PCP of PAC clinic within 30 days of surgery date, preoperative class, and weight check (weigh-in visit) to be scheduled by patient.  Pre-anesthesia clinic for risk evaluation to be scheduled by anesthesia clinic.    4. We cannot guarantee that patient will qualify for surgery unless all preoperative requirements are met, prior authorization from primary insurance company is granted, and insurance changes do not occur.    5. It is possible for patients to regain all weight after weight loss surgery unless they follow guidelines prescribed by our bariatric center.    6. All patients with gastrointestinal complaints after weight loss surgery must have complaints conveyed to the bariatric team for appropriate treatment.    7. Vitamin deficiencies may develop post-bariatric surgery and annual laboratory testing should be performed.    8. Persistent nausea/vomiting after  bariatric surgery entails risk of thiamine deficiency and should be treated early.  Vitamin B12 deficiency may develop, especially after gastric bypass surgery and must be recognized.        If you have any questions about our plans please don't hesitate to contact me.    Sincerely,    BHANU TRONCOSO PA-C      37 minutes spent on the date of the encounter doing chart review, history and exam, documentation and further activities per the note

## 2023-03-23 NOTE — PATIENT INSTRUCTIONS
"Thank you for allowing us the privilege of caring for you. We hope we provided you with the excellent service you deserve.   Please let us know if there is anything else we can do for you so that we can be sure you are completely satisfied with your care experience.    To ensure the quality of our services you may be receiving a patient satisfaction survey from an independent patient satisfaction monitoring company.    The greatest compliment you can give is a \"Likely to Recommend\"    Your visit was with BHANU TRONCOSO PA-C today.    Instructions per today's visit:     Robert Best, it was great to visit with you today.  Here is a review of our visit.  If our clinic scheduler is not able to reach you please call [phone] to schedule your next appointments.    1. Clearance needed - PCP, therapist, pulmonology, neurology   2. After EGD discuss with Dr. Higgins - gastric bypass vs. Sleeve and emgality   3. Lauren Bloch, [facility] Pharmacist 1 month prior to surgery   4. Follow up with Bhanu Toro in 3 months       Information about Video Visits with [facility]: video visit information  _________________________________________________________________________________________________________________________________________________________  If you are asked by your clinic team to have your blood pressure checked:  [facility] do offer several locations for blood pressure checks. Please follow the below link to schedule an appointment. Scheduling an appointment at the pharmacy for a blood pressure check is now preferred.    Appointment Plus ([email or web address])  _________________________________________________________________________________________________________________________________________________________  Important contact and scheduling information:  Please call our contact center at [phone] to schedule your next appointments.  To find a lab location near you, please call [phone].  For any nursing questions or concerns call Kymberly Montemayor LPN at [phone] or Simona Maier RN at [phone]  Please call during clinic hours Monday through Friday 8:00a - 4:00p if you have questions or you can contact us via Qubulushart at anytime and we will reply during clinic hours.    Lab results will be communicated through My Chart or letter (if My Chart not used). Please call the clinic if you have not received communication after 1 week or if you have any questions.?  Clinic Fax: [phone]    _________________________________________________________________________________________________________________________________________________________  Meal Replacement Products:    Here is the link to our new e-store where you can purchase our meal replacement products    [facility] E-Store  [email or web address]    The one week starter kit is a great way to sample a variety of products and see what works for you.    If you want more information about the product go to: [employer]    If you are an employee or [facility] or [facility] please contact your care team for a 10% estore discount    Free Shipping for orders over $75     Benefits of meal replacements products:    Portion and calorie control  Improved nutrition  Structured eating  Simplified food choices  Avoid contact with trigger foods  _________________________________________________________________________________________________________________________________________________________  Interested in working with a health ?  Health coaches work with you to improve your overall health and wellbeing.  They look at the whole person, and may involve discussion of different areas of life, including, but not limited to the four pillars of health (sleep, exercise, nutrition, and stress management). Discuss with your care team if you would like to start working a health  .  Health Coaching-3 Pack: Schedule by calling [phone]    $99 for three health coaching visits    Visits may be done in person or via phone    Coaching is a partnership between the  and the client; Coaches do not prescribe or diagnose    Coaching helps inspire the client to reach his/her personal goals   _________________________________________________________________________________________________________________________________________________________  24 Week Healthy Lifestyle Plan:    Our mission in the 24-week Healthy Lifestyle Plan is to provide you with individualized care by giving you the tools, education and support you need to lose weight and maintain a healthy lifestyle. In your 24-week journey, you ll be supported by a dedicated weight loss team that includes registered dietitians, medical weight management providers, health coaches, and nurses -- all with special expertise in weight loss -- to help you every step of the way.     Monthly meetings with your registered dietician or medical weight management provider help to review your progress, update your care plan, and make any adjustments needed to ensure success. Between these visits, weekly and bi-weekly health  visits will help you focus on the four pillars of weight loss -- stress, sleep, nutrition, and exercise -- and how you can best adapt each to achieve sustainable weight loss results.    In addition, you will be given exclusive access to online wellbeing classes through [employer].  Your initial visit will be with a medical weight management provider who will help to understand your weight loss goals and ensure this program is the right fit for you. Please let our team know if you are interested in the 24 week plan by sending a message to your care team or calling [phone] to  "schedule.  _________________________________________________________________________________________________________________________________________________________    COMPREHENSIVE WEIGHT MANAGEMENT PROGRAM  VIRTUAL SUPPORT GROUPS    For Support Group Information:      We offer support groups for patients who are working on weight loss and considering, preparing for or have had weight loss surgery.   There is no cost for this opportunity.  You are invited to attend the?Virtual Support Groups?provided by any of the following locations:    [facility] via Microsoft Teams with Gemini Epperson RN  2.   [place] via PhishMe with Daren Kline, PhD, LP  3.   [place] via PhishMe with Georgia Abrams RN  4.   [facility] via [employer] Teams with Georgia Cosby [facility]    The following Support Group information can also be found on our website:  [email or web address]    [facility] Weight Loss Surgery Support Group    [facility] Weight Loss Surgery Support Group  The support group is a patient-lead forum that meets monthly to share experiences, encouragement and education. It is open to those who have had weight loss surgery, are scheduled for surgery, and those who are considering surgery.   WHEN: This group meets on the 3rd Wednesday of each month from 5:00PM - 6:00PM virtually using Microsoft Teams.   FACILITATOR: Led by Gemini Epperson, DELTA, LD, RN, the program's Clinical Coordinator.   TO REGISTER: Please contact the clinic via Sociocast or call the nurse line directly at [phone] to inform our staff that you would like an invite sent to you and to let us know the email you would like the invite sent to. Prior to the meeting, a link with directions on how to join the meeting will be sent to you.    2022 Meetings  Ivelisse 15: \"Let's Talk\" a time for the group to share.  July 20: \"Let's Talk\" a time for the group to " "share.  August 17: \"Let's Talk\" a time for the group to share.  September 21: \"Let's Talk\" a time for the group to share.  October 19: Guest Speaker: Dr Gianluca Martinez MD Pulmonologist and Sleep Medicine Physician, \"Getting a Good Night's Sleep\".  November 16: \"Let's Talk\" a time for the group to share.  December 21: \"Let's Talk\" a time for the group to share.    [facility] Support Groups    Connections: Bariatric Care Support Group?  This is open to all [facility] (and those external to this program) pre- and post- operative bariatric surgery patients as well as their support system.   WHEN: This group meets the 2nd Tuesday of each month from 6:30 PM - 8:00 PM virtually using Microsoft Teams.   FACILITATOR: Led by Daren Kline, Ph.D who is a Licensed Psychologist with the [facility] Comprehensive Weight Management Program.   TO REGISTER: Please send an email to Daren Kline, Ph.D., LP at?[email or web address]?if you would like an invitation to the group and to learn about using Microsoft Teams.    2022 Meetings  June 14: Gracy Roth RD, LD at [facility], \"Nutritional Labeling\"  July 12 August 2 (Please Note Date Change)  September 13 October 11 November 8 December 13    Connections: Post-Operative Bariatric Surgery Support Group  This is a support group for [facility] bariatric patients (and those external to [facility]) who have had bariatric surgery and are at least 3 months post-surgery.  WHEN: This support group meets the 4th Wednesday of the month from 11:00 AM - 12:00 PM virtually using Microsoft Teams.   FACILITATOR: Led by Certified Bariatric Nurse, Georgia Abrams RN.   TO REGISTER: Please send an email to Georgia at [email or web address] if you would like an invitation to the group and to learn about using Microsoft Teams.    2022 Meetings June 22 July 27 August 24 September 28 October 26 November 23 December " "28      [facility] Healthy Lifestyle Virtual Support Group    Healthy Lifestyle Virtual Support Group?  This is 60 minutes of small group guided discussion, support and resources. All are welcome who want a healthy lifestyle.  WHEN: This group meets monthly on a Friday from 12:30 PM - 1:30 PM virtually using Microsoft Teams.   FACILITATOR: Led by National Board Certified Health and , Georgia Cosby [facility].   TO REGISTER: Please send an email to Georgia [email or web address] to receive monthly invites to the group or if you have any questions about having a health .  Prior to the meeting, a link with directions on how to join the meeting will be sent to you.    2022 Meetings  June 24: Georgia Cosby [facility], \"Setting Limits and Boundaries\".  Jul 29: Open Forum  August 26: Guest Speaker: Ronda Ferreira Registered Dietitian  September 30: Open Forum  October 28th: Guest Speaker: Susie James [facility], Health , \"Gratitude Practices\".  November 18: Guest Speaker: Laura Trinidad RD Registered Dietitian, \"Navigating How to Eat around the Holidays\".  December 16: Guest Speaker: Awilda Flores [facility], \"Changing Your Relationship with Movement\".    ____________________________________________________________________________________________________________________________________________________________________________  [facility] Get Moving Program  Our team of physical therapists is trained to help you understand and take control of your condition. They will perform a thorough evaluation to determine your ability for activity and develop a customized plan to fit your goals and physical ability.  Scheduling: Unsure if the Get Moving program is right for you? Discuss the program with your medical provider or diabetes educator. You can also call us at [phone] to ask questions or schedule an appointment.   [facility] Get Moving " Program  ____________________________________________________________________________________________________________________________________________________________________________  [facility] Diabetes Prevention Program (DPP)  If you have prediabetes and Medicare please contact us via "[employer]hart to learn more about the Diabetes Prevention Program (DPP)  Program Details:  [facility] offers the year-long Diabetes Prevention Program (DPP). The program helps you to make lifestyle changes that prevent or delay type 2 diabetes by supporting healthy eating, increased physical activity, stress reduction and use of coping skills.   On average, previous [facility] DPP cohorts have lost and maintained at least 5% of their starting weight throughout the program and averaged more than 150 minutes of physical activity per week.  Participants meet weekly for one-hour group sessions over sixteen weeks, every other week for the next 8 weeks, and monthly for the last six months.   A year-long maintenance program is also available for participants who complete the first year.   Location & Cost:   During the COVID-19 Public Health Emergency, the program is offered virtually. When in-person classes can resume, they will be held at [facility].  For people with Medicare, the program is covered in full. A self-pay option will also be available for those with non-Medicare insurance plans.   _________________________________________________________________________________________________________________________________________________________  Bluetooth Scale:    We hope to provide you with high quality virtual healthcare visits while social distancing for COVID-19 is necessary, as well as in the future when virtual visits may be more convenient for you.     Our technology team made it possible for Bluetooth scales to send weight measurements to our electronic medical record. This allows  weights from you weighing at home to securely flow into the medical record, which will improve telephone and virtual visits.   Additionally, studies have shown that adults actually lose more weight when their weights are automatically sent to someone else, and also that this process is not stressful for those adults.    Below is a link for purchasing the scale, with a discount code for our patients. You may call your insurance company to see if they will reimburse you for the cost of the scale, as a piece of durable medical equipment. The scales only go up to a weight of 400 pounds. This is an issue and we are working with the developer on increasing this. We found no scales that go over 400lb that have blue-tooth for connecting to ApplyKit.    Scale to purchase: the theRightAPI  Body  Scale: [email or web address]/[email or web address]=aw.ds    Discount Code: We have a discount code for our patients to bring the cost down to $50, Discount code is: [place]_Scale_20%off  _______________________________________________________________________________________________________________________________________________________________________________    To work with a Behavioral Health Psychologist:    Call to schedule:    Vitaliy Smith - [phone]  Roseanna Dudley - [phone]  Jhoana Wong - [phone]  Evie Bush - [phone]   Kacy Manuel PhD (cannot accept Medicare) [phone]        Thank you,   [facility] Comprehensive Weight Management Team

## 2023-03-27 NOTE — TELEPHONE ENCOUNTER
Clearance letters resent to patient. Tasklist updated.    Bariatric Task List    Fax:  Please fax all paperwork to: [phone] -     Status:  Is patient a candidate for bariatric surgery?:  patient is a candidate for bariatric surgery -     Cleared to schedule surgeon consult?:  cleared to schedule surgeon consult -     Status:  surgery evaluation in process -     Surgeon: Dr. Higgins -     Tentative surgery month/year: TBD -        Insurance: Insurance:  [employer] -      Contact insurance to discuss coverage: Needed -       [employer]: PCP Recommendation and Medical Clearance:    -     HP Referral:    -      Advanced beneficiary notification (ABN) for Medicare patients for RD visits   and surgery:   -      Weight history:   -     Other:    -        Patient Info: Initial Weight:  252 -     Date of Initial Weight/Height:  12/5/2022 -     Goal Weight (lbs):  242 -     Required Weight Loss:  10 -     Surgery Type:  undecided -     Multidisciplinary Meeting:    -        Dietician Visits: Structured weight loss required by insurance?:    -     Dietician Visit 1:  Completed - 12/6/22, Ronda [place]ney, [place]   Dietician Visit 2:  Completed - 1/17/23, Ronda Tierney, [place]   Dietician Visit 3:  Completed - 3/22/2023 - [facility]   Dietician Visit 4:    -     Dietician Visit 5:    -     Dietician Visit 6:    -     Dietician Visit additional:    - Monthly until surgery - [place]   Clearance from dietician to see surgeon?:    -     Dietician Notes:    -        Psychological Evaluation: Psych eval:  Completed - List and letter sent to patient, 12/7/22, [place] Scheduled for Feb 23rd    Therapist letter of support:  Needed - Letter sent to pt 3/27/23 - [place]   Psychiatrist letter of support:    -     Establish care with therapist:    -     Complete eating disorder evaluation:    -     Letter of clearance from therapist/eating disorder program:    -     Other:    -        Lab Work: Complete Blood Count:  Completed -    "  Comprehensive Metabolic Panel:  Completed -     Vitamin D:  Completed -     PTH:  Completed -     Hgb A1c:  Completed -      Lipids: Completed -      TSH ([facility], [facility], [place] [facility]):   -       Ferritin:   -       Folate:   -       Testosterone, Total and Free:   -     Thiamine:   -     Vitamin A:   -     Vitamin B12:   -     Zinc:   -     C-peptide:   -     H. pylori:    -     MRSA (2 swabs, minimum 48 hours apart):   -     Nicotine Testing:    -     Recheck Vitamin D:   -     Other:    -        Consults/ Clearance Sleep Medicine:    -     Cardiac:  Needed - Letter sent to patient, 3/27/23 - [place]   Pain:   - Possibly, to eval at next visit   Dental:    -     Endocrine:    -     Gastroenterology:    -     Vascular Medicine:    -     Hematology:  Completed - econlt per heme, Letter sent to patient, 12/7/22, [place], advised 60mg Lovenox twice daily for 4 weeks post op - [facility]   Medical Weight Management:   -     Physical Therapy/Exercise:    -     Nephrology:    -     Neurology:  Needed - Letter sent to patient, 3/27/23 - [place]   Pulmonology:  Needed - Letter sent to patient, 3/27/23 - [place]   Rheumatology:    -     Other:    -     Other:    -     Other:    -        Testing: UGI:    -     EGD:    -     Sleep Study:   -     Other:   -     Other:    -        PCP: Establish care with PCP:  Completed -     Follow up with PCP:    -     PCP letter of support:  Needed - Letter sent to patient, 3/27/23 - [place]      Stopping Smoking/ Alcohol Use: Quit tobacco use (3 months smoke free)?:    -     Quit date:    -     Quit alcohol use:   -     Quit date:   -     Other:   -     Quit date:   -        Patient Education:  Information Session:  Needed -     Given \"Making your decision\" handout?:  Yes -     Given \"A Roadmap to you Weight Loss Surgery\" handout?: Yes -     Given \"Get Well Loop\" information?: Yes -     Given support group information?:    -     Attended support group?:  Needed -     Support plan in place?:    -     Research consents signed?:    " -     Avoid NSAIDS/ Alternate Plan for Pain:   -        Additional Surgery Requirements: Review Coag plan:    -     HgA1c <8:    -     Inpatient pain consult:    -     Final nicotine screen:    -     Dental work complete:    -     Birth control plan:    -     Gallstone prevention plan (Actigall for 6 months postop):   -     Other:   -     Other:   -        Final Tasks:  Before surgery online class:  Needed -     Before surgery online class website link:  [email or web address]   After surgery online class:  Needed -     After surgery online class website link:  [email or web address]   Nurse visit per clinic:  Needed -     History and Physical per clinic:   -     Final labs per clinic: Needed -     Chest xray per clinic:   -     Electrocardiogram (ECG) per clinic:   -     Other:   -        Notes:   -

## 2023-04-12 ENCOUNTER — HOSPITAL ENCOUNTER (OUTPATIENT)
Facility: CLINIC | Age: 58
Discharge: HOME | End: 2023-04-12
Attending: SURGERY | Admitting: SURGERY
Payer: COMMERCIAL

## 2023-04-12 VITALS
RESPIRATION RATE: 16 BRPM | SYSTOLIC BLOOD PRESSURE: 116 MMHG | OXYGEN SATURATION: 94 % | DIASTOLIC BLOOD PRESSURE: 72 MMHG | HEART RATE: 60 BPM

## 2023-04-12 LAB — UPPER GI ENDOSCOPY: NORMAL

## 2023-04-12 PROCEDURE — 88305 TISSUE EXAM BY PATHOLOGIST: CPT | Mod: TC | Performed by: SURGERY

## 2023-04-12 PROCEDURE — 43239 EGD BIOPSY SINGLE/MULTIPLE: CPT | Performed by: SURGERY

## 2023-04-12 PROCEDURE — 999N000099 HC STATISTIC MODERATE SEDATION < 10 MIN: Performed by: SURGERY

## 2023-04-12 PROCEDURE — 88305 TISSUE EXAM BY PATHOLOGIST: CPT | Mod: 26 | Performed by: PATHOLOGY

## 2023-04-12 PROCEDURE — 250N000011 HC RX IP 250 OP 636: Performed by: SURGERY

## 2023-04-12 RX ORDER — PROCHLORPERAZINE MALEATE 5 MG
10 TABLET ORAL EVERY 6 HOURS PRN
Status: DISCONTINUED | OUTPATIENT
Start: 2023-04-12 | End: 2023-04-12 | Stop reason: HOSPADM

## 2023-04-12 RX ORDER — ONDANSETRON 4 MG/1
4 TABLET, ORALLY DISINTEGRATING ORAL EVERY 6 HOURS PRN
Status: DISCONTINUED | OUTPATIENT
Start: 2023-04-12 | End: 2023-04-12 | Stop reason: HOSPADM

## 2023-04-12 RX ORDER — NALOXONE HYDROCHLORIDE 0.4 MG/ML
0.4 INJECTION, SOLUTION INTRAMUSCULAR; INTRAVENOUS; SUBCUTANEOUS
Status: DISCONTINUED | OUTPATIENT
Start: 2023-04-12 | End: 2023-04-12 | Stop reason: HOSPADM

## 2023-04-12 RX ORDER — NALOXONE HYDROCHLORIDE 0.4 MG/ML
0.2 INJECTION, SOLUTION INTRAMUSCULAR; INTRAVENOUS; SUBCUTANEOUS
Status: DISCONTINUED | OUTPATIENT
Start: 2023-04-12 | End: 2023-04-12 | Stop reason: HOSPADM

## 2023-04-12 RX ORDER — LIDOCAINE 40 MG/G
CREAM TOPICAL
Status: DISCONTINUED | OUTPATIENT
Start: 2023-04-12 | End: 2023-04-12 | Stop reason: HOSPADM

## 2023-04-12 RX ORDER — ONDANSETRON 2 MG/ML
4 INJECTION INTRAMUSCULAR; INTRAVENOUS EVERY 6 HOURS PRN
Status: DISCONTINUED | OUTPATIENT
Start: 2023-04-12 | End: 2023-04-12 | Stop reason: HOSPADM

## 2023-04-12 RX ORDER — FENTANYL CITRATE 50 UG/ML
INJECTION, SOLUTION INTRAMUSCULAR; INTRAVENOUS PRN
Status: DISCONTINUED | OUTPATIENT
Start: 2023-04-12 | End: 2023-04-12 | Stop reason: HOSPADM

## 2023-04-12 RX ORDER — FLUMAZENIL 0.1 MG/ML
0.2 INJECTION, SOLUTION INTRAVENOUS
Status: DISCONTINUED | OUTPATIENT
Start: 2023-04-12 | End: 2023-04-12 | Stop reason: HOSPADM

## 2023-04-12 ASSESSMENT — ACTIVITIES OF DAILY LIVING (ADL): ADLS_ACUITY_SCORE: 35

## 2023-04-12 NOTE — H&P
[facility] Anesthesia Pre-op History and Physical    Shira Best MRN# [id number]   Age: 57 year old YOB: 1965                    Location [facility], [place]; 3rd floor endoscopy center          Primary care provider: Karla Roberto         Chief Complaint and/or Reason for Procedure:   History of ulcer; interested in weight loss surgery.         Active problem list:     Patient Active Problem List    Diagnosis Date Noted     Class 3 severe obesity with serious comorbidity and body mass index (BMI) of 45.0 to 49.9 in adult (H) 12/05/2022     Priority: Medium     Pre-diabetes 12/05/2022     Priority: Medium     Arthritis of right acromioclavicular joint 10/11/2022     Priority: Medium     Formatting of this note might be different from the original.  Added automatically from request for surgery [id number]       Osteopenia of multiple sites 08/09/2022     Priority: Medium     Osteoarthritis of one hip, left 03/18/2021     Priority: Medium     Formatting of this note might be different from the original.  Added automatically from request for surgery [id number]       H/O: hysterectomy 09/20/2020     Priority: Medium     Aspirin long-term use 09/09/2020     Priority: Medium     Stiffness of joints of both hands 10/18/2018     Priority: Medium     S/P TKR (total knee replacement) using cement, right 02/28/2018     Priority: Medium     Osteoarthritis of both knees, unspecified osteoarthritis type 02/15/2018     Priority: Medium     Cataract, left 06/16/2017     Priority: Medium     Fibromyalgia 06/06/2016     Priority: Medium     Stage 2 moderate COPD by GOLD classification (H) 01/13/2016     Priority: Medium     Coronary artery disease involving native coronary artery of native heart without angina pectoris 11/29/2015     Priority: Medium     Macrocytosis 09/13/2015     Priority: Medium     Migraine without aura and without status migrainosus, not intractable 08/26/2015     Priority: Medium      Automatic implantable cardioverter-defibrillator in situ 03/09/2015     Priority: Medium     Formatting of this note might be different from the original.  Images from the original note were not included.  [employer] Incepta [id number] SN# [id number] 03/09/2015  RA lead Medtronic 5076 SN# [id number] 03/09/2015  RV lead [employer] 0295 SN# [id number] 03/09/2015  Dr. Huffman  VF  Intrinisic Sinus rhythm 60 bpm    ICD/Non-dependent on pacemaker Above Umbilicus/> 5 seconds cautery =  Use Magnet (Only deactivates the Tachy therapy) Allows quick removal for treatment if VT or Abnormal Rhythm presents.  ICD Pocket = Left upper chest    .       Displacement of lumbar intervertebral disc without myelopathy 11/06/2012     Priority: Medium     Vitamin D deficiency 08/30/2010     Priority: Medium     Hyperlipidemia 08/26/2010     Priority: Medium     Essential hypertension 09/12/2008     Priority: Medium     Anxiety state 08/14/2007     Priority: Medium     Formatting of this note might be different from the original.  IMO Update 10/11       Allergic rhinitis 02/15/2001     Priority: Medium     Formatting of this note might be different from the original.  IMO Update              Medications (include herbals and vitamins):     No current facility-administered medications for this encounter.     Current Outpatient Medications   Medication Sig     acetaminophen (TYLENOL) 325 MG tablet Take 650 mg by mouth     albuterol (PROAIR HFA/PROVENTIL HFA/VENTOLIN HFA) 108 (90 Base) MCG/ACT inhaler Inhale 1-2 puffs into the lungs     aspirin (ASA) 81 MG chewable tablet 81 mg     benzonatate (TESSALON) 200 MG capsule Take 200 mg by mouth as needed     Budeson-Glycopyrrol-Formoterol (BREZTRI AEROSPHERE) 160-9-4.8 MCG/ACT AERO Inhale 2 puffs into the lungs 2 times daily     busPIRone (BUSPAR) 10 MG tablet Take 10 mg by mouth 3 times daily     butalbital-acetaminophen-caffeine (ESGIC) -40 MG tablet Take 1 tablet by mouth as needed      famotidine (PEPCID) 40 MG tablet Take 1 tablet by mouth At Bedtime     furosemide (LASIX) 20 MG tablet Take 20 mg by mouth daily     gabapentin (NEURONTIN) 300 MG capsule Take 900 mg by mouth 3 times daily     galcanezumab-gnlm (EMGALITY) 120 MG/ML injection Inject 1 auto injector pen every month, rotate injection site locations each month. For monthly maintenance dosing to prevent headaches/migraines.     JARDIANCE 10 MG TABS tablet TAKE 1 TABLET BY MOUTH ONCE DAILY - DO NOT CHEW OR CRUSH     magnesium 250 MG tablet Take 250 mg by mouth     metFORMIN (GLUCOPHAGE XR) 500 MG 24 hr tablet Take 500 mg by mouth     metoprolol succinate ER (TOPROL XL) 25 MG 24 hr tablet TAKE 1 & 1/2 (ONE & ONE-HALF) TABLETS BY MOUTH ONCE DAILY -  DO  NOT  CRUSH  OR  CHEW     nystatin (MYCOSTATIN) 085044 UNIT/GM external powder Apply 60 g topically as needed     oxyCODONE (ROXICODONE) 5 MG tablet Take 5 mg by mouth daily as needed     pantoprazole (PROTONIX) 40 MG EC tablet Take 40 mg by mouth At Bedtime     potassium chloride ER (K-TAB/KLOR-CON) 10 MEQ CR tablet Take 10 mEq by mouth daily     prochlorperazine (COMPAZINE) 5 MG tablet Take 1-2 tablets by mouth at onset of migraine headache, limit 3 times per week.     riboflavin 100 MG CAPS Take 1 tablet by mouth 2 times daily     rosuvastatin (CRESTOR) 40 MG tablet Take 1 tablet by mouth At Bedtime     sotalol (BETAPACE) 80 MG tablet Take 80 mg by mouth 2 times daily     spironolactone (ALDACTONE) 25 MG tablet Take 1 tablet by mouth daily at 2 pm     topiramate (TOPAMAX) 100 MG tablet Take 100 mg by mouth At Bedtime     traZODone (DESYREL) 150 MG tablet Take 150 mg by mouth At Bedtime     venlafaxine (EFFEXOR) 50 MG tablet Take 50 mg by mouth daily             Allergies:      Allergies   Allergen Reactions     Amitriptyline      Tongue swells up and gets little dots on tongue     Amoxicillin      Sore tongue (no problems with Amoxicillin)  ++ has tolerated cephalexin and cefazolin ++      Sulindac      Lip swelling  3-7-17: Has tolerated other NSAIDs              Physical Exam:   No data found.  No intake/output data recorded.  Breathing unlabored  NAD            Lab / Radiology Results:   Guidelines for CXR: new or unstable cardio-pulmonary disease         Anesthetic risk and/or ASA classification:       Gerard Higgins MD     Plan    Upper Endoscopy to evaluate for stomach surgery      Gerard Higgins MD  Surgery  [phone] (hospital )  [phone] (clinic nurses)      Component 2 yr ago   GI PROCEDURE  [facility]   Gastroenterology   _______________________________________________________________________________   Patient Name: Shira Best        MRN: [id number]   YOB: 1965              Patient Status: Outpatient   Age: 55                               Gender: Female   Note Status: Finalized                Procedure Date No Time: 3/2/2021   _______________________________________________________________________________     Procedure:             Upper GI endoscopy   Endoscopist:           MARIS SANTIAGO MD   Referring MD:          MARIS HARDIN MD   Indications:           Heartburn   Procedure Medications: Monitored Anesthesia Care   Patient Profile:       This is a 55 year old female.   Procedure:             Pre-Anesthesia Assessment:                          - Prior to the procedure, a History and Physical was                          performed, and patient medications and allergies were                          reviewed. The patient's tolerance of previous                          anesthesia was also reviewed. The risks and benefits                          of the procedure and the sedation options and risks                          were discussed with the patient. All questions were                          answered, and informed consent was obtained. Prior                          Anticoagulants: The patient last took aspirin on the                           day of the procedure. ASA Grade Assessment: III - A                          patient with severe systemic disease. After reviewing                          the risks and benefits, the patient was deemed in                          satisfactory condition to undergo the procedure.                          - Pre-procedure physical examination revealed no                          contraindications to sedation.                          - Universal Protocol:                          - Pre-procedure Verification: Prior to the procedure,                          the patient's identity was verified by full name and                          date of birth. The patient's identity was verified on                          all pertinent medical records, including History and                          Physical. Also prior to the procedure, a History and                          Physical was performed, and patient medications,                          allergies and sensitivities were reviewed. The                          patient's tolerance of previous anesthesia was                          reviewed. The risks and benefits of the procedure and                          the sedation options and risks were discussed with the                          patient. All questions were answered and informed                          consent was obtained.                          - Time-Out: Prior to the start of the procedure, the                          patient's identification, proposed procedure, accurate                          signed consent, correctly labeled images and records,                          and need for prophylactic antibiotics were verified by                          the physician, the nurse, the anesthetist and the                          technician in the procedure room.                          - ASA Grade Assessment: III - A patient with severe                          systemic disease.                           - Potential procedural benefits and risks were                          explained to the patient including: drug reactions,                          bleeding, perforation, and missing important lesions.                          Informed consent was confirmed and the patient was                          deemed in satisfactory condition to undergo the                          procedure. Throughout the procedure, the patient's                          blood pressure, pulse, and oxygen saturations were                          monitored continuously. The Olympus upper endoscope                          was passed under direct vision through the mouth, and                          advanced to the second part of duodenum. The upper GI                          endoscopy was accomplished without difficulty. The                          patient tolerated the procedure well.                                                                                    Findings:              The Z-line was variable and was found 40 cm from the                          incisors.                          Normal mucosa was found in the entire esophagus.                          Multiple localized, large non-bleeding erosions were                          found in the gastric antrum. There were no stigmata of                          recent bleeding. Biopsies were taken with a cold                          forceps for histology. Estimated blood loss was                          minimal.                          The cardia and gastric fundus were normal on                          retroflexion.                          The examined duodenum was normal.   Complications:         No immediate complications.   Estimated Blood Loss:        Estimated blood loss was minimal.   Impression:            - Z-line variable, 40 cm from the incisors.                          - Normal mucosa was found in the entire esophagus.                           - Non-bleeding erosive gastropathy. Biopsied.                          - Normal examined duodenum.   Recommendation:        - Patient has a contact number available for                          emergencies. The signs and symptoms of potential                          delayed complications were discussed with the patient.                          Return to normal activities tomorrow. Written                          discharge instructions were provided to the patient.                          - Resume previous diet.                          - Continue present medications.                          - Await pathology results.                          - Repeat upper endoscopy after studies are complete                          for surveillance based on pathology results.     Karla Matias MD   ___________________   KARLA MATIAS MD   3/2/2021 11:26:53 AM   This report has been signed electronically.   _______________________________________________________________________________   Number of Addenda: 0     Note Initiated On: 3/2/2021 11:09 AM

## 2023-04-13 LAB
PATH REPORT.COMMENTS IMP SPEC: NORMAL
PATH REPORT.COMMENTS IMP SPEC: NORMAL
PATH REPORT.FINAL DX SPEC: NORMAL
PATH REPORT.GROSS SPEC: NORMAL
PATH REPORT.MICROSCOPIC SPEC OTHER STN: NORMAL
PATH REPORT.RELEVANT HX SPEC: NORMAL
PHOTO IMAGE: NORMAL

## 2023-04-26 ENCOUNTER — MYC MEDICAL ADVICE (OUTPATIENT)
Dept: ENDOCRINOLOGY | Facility: CLINIC | Age: 58
End: 2023-04-26
Payer: COMMERCIAL

## 2023-05-09 ENCOUNTER — VIRTUAL VISIT (OUTPATIENT)
Dept: ENDOCRINOLOGY | Facility: CLINIC | Age: 58
End: 2023-05-09
Payer: COMMERCIAL

## 2023-05-09 DIAGNOSIS — R73.03 PRE-DIABETES: ICD-10-CM

## 2023-05-09 DIAGNOSIS — Z71.3 NUTRITIONAL COUNSELING: Primary | ICD-10-CM

## 2023-05-09 DIAGNOSIS — E66.9 OBESITY: ICD-10-CM

## 2023-05-09 DIAGNOSIS — K21.9 GASTROESOPHAGEAL REFLUX DISEASE WITHOUT ESOPHAGITIS: ICD-10-CM

## 2023-05-09 PROCEDURE — 97803 MED NUTRITION INDIV SUBSEQ: CPT | Mod: VID | Performed by: DIETITIAN, REGISTERED

## 2023-05-09 PROCEDURE — 99207 PR NO CHARGE LOS: CPT | Mod: VID | Performed by: DIETITIAN, REGISTERED

## 2023-05-09 NOTE — LETTER
"5/9/2023       RE: Shira Best  [address] [facility] [place] [address]     Dear Colleague,    Thank you for referring your patient, Shira Best, to the [facility] [place] at [facility]. Please see a copy of my visit note below.    Shira Best is a 57 year old female who is being evaluated via a billable video visit.      The patient has been notified of following:     \"This video visit will be conducted via a call between you and your physician/provider. We have found that certain health care needs can be provided without the need for an in-person physical exam.  This service lets us provide the care you need with a video conversation.  If a prescription is necessary we can send it directly to your pharmacy.  If lab work is needed we can place an order for that and you can then stop by our lab to have the test done at a later time.    Video visits are billed at different rates depending on your insurance coverage.  Please reach out to your insurance provider with any questions.    If during the course of the call the physician/provider feels a video visit is not appropriate, you will not be charged for this service.\"    Patient has given verbal consent for Video visit? Yes  How would you like to obtain your AVS? MyChart  If you are dropped from the video visit, the video invite should be resent to: Text to cell phone: [phone]  Will anyone else be joining your video visit? No  {If patient encounters technical issues they should call [phone]      Video-Visit Details    Type of service:  Video Visit    Video Start Time: 2:32 pm  Video End Time: 3:02 pm  *Switched to telephone visit due to issues with audio. Very choppy.    Originating Location (pt. Location): Home    Distant Location (provider location):  Offsite (providers home)    Platform used for Video Visit: Toñito    During this virtual visit the " "patient is located in [place], patient verifies this as the location during the entirety of this visit.       Return Bariatric Nutrition Consultation Note    Reason For Visit: Nutrition Assessment    Shira Best is a 57 year old presenting today for new bariatric nutrition consult.   Pt is interested in laparoscopic sleeve gastrectomy with Dr. Higgins expected surgery in [place].  Patient is accompanied by self.      Patient saw RD 12/6, 1/17, 2/15, 3/21, and today 5/9/23.    Pt referred by CONCHIS Brandon on December 6, 2022.      CO-MORBIDITIES OF OBESITY INCLUDE:        12/3/2022    10:47 AM   --   I have the following health issues associated with obesity Pre-Diabetes    Heart Disease    High Blood Pressure    High Cholesterol    Polycystic Ovarian Syndrome    GERD (Reflux)    Osteoarthritis (joint disease)       SUPPORT:      12/3/2022    10:47 AM   Support System Reviewed With Patient   Who do you have in your support network that can be available to help you for the first 2 weeks after surgery? Yes my family   Who can you count on for support throughout your weight loss surgery journey? My sister and niece my mom     ANTHROPOMETRICS:  Consult weight: 252 lbs    Estimated body mass index is 42.62 kg/m  as calculated from the following:    Height as of 3/22/23: 1.575 m (5' 2\").    Weight as of 3/22/23: 105.7 kg (233 lb).     Current weight:  228 lbs, pt report    Required weight loss goal pre-op: -10 lbs from initial consult weight (goal weight 242 lbs or less before surgery)        12/3/2022    10:47 AM   --   I have tried the following methods to lose weight Watching portions or calories    Exercise           12/3/2022    10:47 AM   Weight Loss Questions Reviewed With Patient   How long have you been overweight? Since early childhood       SUPPLEMENT INFORMATION:  none    NUTRITION HISTORY:  Per CONCHIS Tierney: Would like to go through surgery because she is unable to lose weight like she had in the " past. Has become harder to exercise    Working with [facility] RD- working on portion sizes and decreasing carb portions.     1/17/22: Pt states she lost 10 lbs but regained d/t sciatica and plantar fascitis. Has been eating smaller portions and eating very slowly. Appetite has decreased.     2/15/23: Pt attended [facility] Nutrition Class    3/21/23: Pt doing well- continues to lose weight. Focused on protein and fluids. EGD scheduled with Dr. Higgins on April 12th.     5/9/23: Reviewed task list. Still waiting on some clearances - were already sent per pt. Will check in with Caitlyn/Pepe and have re-sent if needed.    Curious about what type of surgery is recommended. She really wants RYGB. Defer to Dr. Higgins     Cutting back on carbonation and caffeine. Doing more sugar-free flavored water    Working on pace and smaller portions. Noticing earlier satiety. Not eating after 7 pm    Chews lots of gum but only in office.     Barriers to lifestyle change: Difficulty exercising, previously failed weight loss attempts     Previous goals:  1) Practice  fluids from meals and for 30 minutes after - Working on, sometimes forgetting and finding self having milk - mom often pours her milk with dinner meal. Wants to work on being more consistent.   2) Practice chewing foods thoroughly and taking 20-30 minutes per meal   3) Practice sipping on fluids throughout the day - going well, gets dry mouth from meds so helps with sipping constantly.           12/3/2022    10:47 AM   Recall Diet Questions Reviewed With Patient   Describe what you typically consume for breakfast (typical or most recent) Bagles, yogurt apple   Describe what you typically consume for lunch (typical or most recent) [place], water flavored, bagel apple.   Describe what you typically consume for supper (typical or most recent) Piece of meat, potato, veggie milk   Describe what you typically consume as snacks (typical or most recent) Chips, crackers ,apples,  candy, pop, sweetbread,.   How many ounces of water, or other low calorie drinks, do you drink daily (8 oz=1 glass)? 48 oz   How many ounces of caffeine (coffee, tea, pop) do you drink daily (8 oz=1 glass)? 24 oz   How many ounces of carbonated (pop, beer, sparkling water) drinks do you drinky daily (8 oz=1 glass)? 32 oz   How many ounces of juice, pop, sweet tea, sports drinks, protein drinks, other sweetened drinks, do you drink daily (8 oz=1 glass)? 32 oz   How many ounces of milk do you drink daily (8 oz=1 glass) 8 oz   Please indicate the type of milk 1%   How often do you drink alcohol? Never           12/3/2022    10:47 AM   Eating Habits   Do you have any dietary restrictions? Yes   Do you currently binge eat (eat a large amount of food in a short time)? No   Are you an emotional eater? Yes   Do you get up to eat after falling asleep? No   What foods do you crave? Chocolate salads fruits           12/3/2022    10:47 AM   Dining Out History Reviewed With Patient   How often do you dine out? Rarely.   Where do you dine out? (select all that apply) sit-down restaurants   What types of food do you order when you dine out? tenzin [employer]         EXERCISE:        12/3/2022    10:47 AM   --   How often do you exercise? 1 to 2 times per week   What is the duration of your exercise (in minutes)? 30 Minutes   What types of exercise do you do? home gym   What keeps you from being more active? Pain    I have just had surgery on one or more of my joints     NUTRITION DIAGNOSIS:  Obesity r/t long history of positive energy balance aeb BMI >30 kg/m2.    INTERVENTION:  Intervention Provided/Education Provided on post-op diet guidelines, vitamins/minerals essential post-operatively, GI anatomy of bariatric surgeries, ways to help prepare for post-op diet guidelines pre-operatively, portion/calorie-control, mindful eating and sources of protein.  Patient demonstrates understanding.     Personal barriers to making and  continuing required life changes have been identified, and strategies to overcome those barriers have been recommended AND family and social supports have been assessed and strategies to strengthen those supports have been recommended.    Provided pt with list of goals, RD contact information and resources listed below via [employer].             12/3/2022    10:47 AM   Questions Reviewed With Patient   How ready are you to make changes regarding your weight? Number 1 = Not ready at all to make changes up to 10 = very ready. 10   How confident are you that you can change? 1 = Not confident that you will be successful making changes up to 10 = very confident. 10       Expected Engagement: good    GOALS:  1. Continue working on  fluids from meals and for 30 minutes after. Aiming for consistency. Recommend keeping milk out of sight until after meal.  2. Continue working on small, frequent sips.   3. Continue working on decreasing gum chewing  4. Continue decreasing carbonation  3. Recommend scheduling with Lauren Bloch, Pharm D for med review prior to surgery. Number: [phone]     Goals after surgery:   1. Follow the bariatric post-op diet advancement schedule (see below)  2. Sip on 48-64 oz (or greater) fluids daily, recording intake to help stay on-track.  - Drink at least 1-2 oz of fluid every 15-30 min.  3. Stop vitamins/minerals 1 week before surgery. We will discuss starting a chewable multivitamin at the one week post-op visit.   4. Work towards consuming 60 gm protein daily.     Post-op Diet Advancement Schedule:  Clear Liquid Diet (stage 1):   Low-Fat Full Liquid Diet (stage 2):   Pureed Diet (stage 3):   Soft Diet (stage 4):   Regular Diet (stage 5):     Meal Replacement Shake Options:   *Protein Shake Criteria: no more than 210 Calories, at least 20 grams of protein, and less than 10 grams of sugar   [facility] smoothie (160 Calories, 20 g protein)   Premier Protein (160 Calories, 30 g  protein)  Slim Fast Advanced Nutrition (180 Calories, 20 g protein)  Muscle Milk, lactose-free, 17 oz bottle (210 Calories, 30 g protein)  Integrated Supplements, no artificial sugars (110 Calories, 20 g protein)  Genepro, unflavored protein powder (60 Calories, 30 g protein)  Boost/Ensure Max (160 calories, 30 gm protein)   Fairlife Core Power (170 calories, 26 gm protein)  Aldi's Elevation Protein Powder (180 calories, 30 gm protein)     Chewable Multivitamin Options for After Surgery:  Bariatric Advantage Advanced Multi EA chewable: https://www.bariatricWellAware [email or web address]/item/chewable-advanced-multi-ea  Take 2 chews daily. Additional calcium citrate supplement needed.  - Discount code for Bariatric Advantage vitamin/mineral supplements: UOFMINN (for 15% off order)     MamburaTelekenex Multi Complete 45: https://[email or web address]/products/pdhbq-tppymvfj-24?[email or web address]  Take 2 chews daily. Additional calcium citrate supplement needed.    Traphill's Complete, or generic (with 10 mg iron per chew)   (https://www.[employer].Apieron/p/kids-39-complete-multivitamin-chewable-tablets-orange-grape-38-cherry-150ct-up-38-up-8482/-/[email or web address]#lnk=sametab)   Take 2 chews per day. Additional B12 and calcium citrate supplements needed. Potential need for additional iron supplement (if needing more than 20 mg iron per day)    Bariatric Fusion: https://www.bariatricfusion.com/collections/bariatric-multivitamins    Take 2 chews twice per day.    Optifast Bariatric Multivitamin  https://www.[email or web address]/optifastr-post-bariatric-chewable-vitamin-and-mineral-supplement.html  Take 2 chews twice per day. Additional iron supplement needed (take at separate time from multivitamins)       Post-op Diet Handouts:  Diet Guidelines after Weight-loss Surgery  [email or web address]     Your Stage 1 Diet: Clear Liquids  [email or web address]     Your Stage 2 Diet: Low-fat Full Liquids  [email or web address]      Your Stage 3 Diet: Pureed Foods  [email or web address]     Pureed Pleasures  [email or web address]    Your Stage 4 Diet: Soft Foods  [email or web address]    Your Stage 5 Diet: Regular Foods  [email or web address]    Supplements after Weight Loss Surgery  [email or web address]     The Plate Method  [email or web address]    Protein Sources for Weight Loss  [email or web address]     Carbohydrates  [email or web address]     Mindful Eating  [email or web address]     Summary of Volumetrics Eating Plan  [email or web address]     Diet Guidelines after Weight Loss Surgery  [email or web address]     Seated Exercises for Arms and Legs (can be done before or after surgery)  [email or web address]    Follow up:   Thursday, June 29th at 2:30 pm    Time spent with patient: minutes.  JOHN Talamantes, RD, LD

## 2023-05-09 NOTE — PROGRESS NOTES
"Shira Best is a 57 year old female who is being evaluated via a billable video visit.      The patient has been notified of following:     \"This video visit will be conducted via a call between you and your physician/provider. We have found that certain health care needs can be provided without the need for an in-person physical exam.  This service lets us provide the care you need with a video conversation.  If a prescription is necessary we can send it directly to your pharmacy.  If lab work is needed we can place an order for that and you can then stop by our lab to have the test done at a later time.    Video visits are billed at different rates depending on your insurance coverage.  Please reach out to your insurance provider with any questions.    If during the course of the call the physician/provider feels a video visit is not appropriate, you will not be charged for this service.\"    Patient has given verbal consent for Video visit? Yes  How would you like to obtain your AVS? MyChart  If you are dropped from the video visit, the video invite should be resent to: Text to cell phone: [phone]  Will anyone else be joining your video visit? No  {If patient encounters technical issues they should call [phone]      Video-Visit Details    Type of service:  Video Visit    Video Start Time: 2:32 pm  Video End Time: 3:02 pm  *Switched to telephone visit due to issues with audio. Very choppy.    Originating Location (pt. Location): Home    Distant Location (provider location):  Offsite (providers home)    Platform used for Video Visit: [employer]*    During this virtual visit the patient is located in [place], patient verifies this as the location during the entirety of this visit.       Return Bariatric Nutrition Consultation Note    Reason For Visit: Nutrition Assessment    Shira Best is a 57 year old presenting today for new bariatric nutrition consult.   Pt is interested in laparoscopic sleeve " "gastrectomy with Dr. Higgins expected surgery in TBD.  Patient is accompanied by self.      Patient saw RD 12/6, 1/17, 2/15, 3/21, and today 5/9/23.    Pt referred by CONCHIS Brandon on December 6, 2022.      CO-MORBIDITIES OF OBESITY INCLUDE:        12/3/2022    10:47 AM   --   I have the following health issues associated with obesity Pre-Diabetes    Heart Disease    High Blood Pressure    High Cholesterol    Polycystic Ovarian Syndrome    GERD (Reflux)    Osteoarthritis (joint disease)       SUPPORT:      12/3/2022    10:47 AM   Support System Reviewed With Patient   Who do you have in your support network that can be available to help you for the first 2 weeks after surgery? Yes my family   Who can you count on for support throughout your weight loss surgery journey? My sister and niece my mom     ANTHROPOMETRICS:  Consult weight: 252 lbs    Estimated body mass index is 42.62 kg/m  as calculated from the following:    Height as of 3/22/23: 1.575 m (5' 2\").    Weight as of 3/22/23: 105.7 kg (233 lb).     Current weight:  228 lbs, pt report    Required weight loss goal pre-op: -10 lbs from initial consult weight (goal weight 242 lbs or less before surgery)        12/3/2022    10:47 AM   --   I have tried the following methods to lose weight Watching portions or calories    Exercise           12/3/2022    10:47 AM   Weight Loss Questions Reviewed With Patient   How long have you been overweight? Since early childhood       SUPPLEMENT INFORMATION:  none    NUTRITION HISTORY:  Per CONCHIS Tierney: Would like to go through surgery because she is unable to lose weight like she had in the past. Has become harder to exercise    Working with [facility] RD- working on portion sizes and decreasing carb portions.     1/17/22: Pt states she lost 10 lbs but regained d/t sciatica and plantar fascitis. Has been eating smaller portions and eating very slowly. Appetite has decreased.     2/15/23: Pt attended S Nutrition " Class    3/21/23: Pt doing well- continues to lose weight. Focused on protein and fluids. EGD scheduled with Dr. Higgins on April 12th.     5/9/23: Reviewed task list. Still waiting on some clearances - were already sent per pt. Will check in with Caitlyn/Pepe and have re-sent if needed.    Curious about what type of surgery is recommended. She really wants RYGB. Defer to Dr. Higgins     Cutting back on carbonation and caffeine. Doing more sugar-free flavored water    Working on pace and smaller portions. Noticing earlier satiety. Not eating after 7 pm    Chews lots of gum but only in office.     Barriers to lifestyle change: Difficulty exercising, previously failed weight loss attempts     Previous goals:  1) Practice  fluids from meals and for 30 minutes after - Working on, sometimes forgetting and finding self having milk - mom often pours her milk with dinner meal. Wants to work on being more consistent.   2) Practice chewing foods thoroughly and taking 20-30 minutes per meal   3) Practice sipping on fluids throughout the day - going well, gets dry mouth from meds so helps with sipping constantly.           12/3/2022    10:47 AM   Recall Diet Questions Reviewed With Patient   Describe what you typically consume for breakfast (typical or most recent) Bagles, yogurt apple   Describe what you typically consume for lunch (typical or most recent) [place], water flavored, bagel apple.   Describe what you typically consume for supper (typical or most recent) Piece of meat, potato, veggie milk   Describe what you typically consume as snacks (typical or most recent) Chips, crackers ,apples, candy, pop, sweetbread,.   How many ounces of water, or other low calorie drinks, do you drink daily (8 oz=1 glass)? 48 oz   How many ounces of caffeine (coffee, tea, pop) do you drink daily (8 oz=1 glass)? 24 oz   How many ounces of carbonated (pop, beer, sparkling water) drinks do you drinky daily (8 oz=1 glass)? 32 oz   How  many ounces of juice, pop, sweet tea, sports drinks, protein drinks, other sweetened drinks, do you drink daily (8 oz=1 glass)? 32 oz   How many ounces of milk do you drink daily (8 oz=1 glass) 8 oz   Please indicate the type of milk 1%   How often do you drink alcohol? Never           12/3/2022    10:47 AM   Eating Habits   Do you have any dietary restrictions? Yes   Do you currently binge eat (eat a large amount of food in a short time)? No   Are you an emotional eater? Yes   Do you get up to eat after falling asleep? No   What foods do you crave? Chocolate salads fruits           12/3/2022    10:47 AM   Dining Out History Reviewed With Patient   How often do you dine out? Rarely.   Where do you dine out? (select all that apply) sit-down restaurants   What types of food do you order when you dine out? tenzin Vizcarra, Chinese         EXERCISE:        12/3/2022    10:47 AM   --   How often do you exercise? 1 to 2 times per week   What is the duration of your exercise (in minutes)? 30 Minutes   What types of exercise do you do? home gym   What keeps you from being more active? Pain    I have just had surgery on one or more of my joints     NUTRITION DIAGNOSIS:  Obesity r/t long history of positive energy balance aeb BMI >30 kg/m2.    INTERVENTION:  Intervention Provided/Education Provided on post-op diet guidelines, vitamins/minerals essential post-operatively, GI anatomy of bariatric surgeries, ways to help prepare for post-op diet guidelines pre-operatively, portion/calorie-control, mindful eating and sources of protein.  Patient demonstrates understanding.     Personal barriers to making and continuing required life changes have been identified, and strategies to overcome those barriers have been recommended AND family and social supports have been assessed and strategies to strengthen those supports have been recommended.    Provided pt with list of goals, RD contact information and resources listed below via  Dereje.             12/3/2022    10:47 AM   Questions Reviewed With Patient   How ready are you to make changes regarding your weight? Number 1 = Not ready at all to make changes up to 10 = very ready. 10   How confident are you that you can change? 1 = Not confident that you will be successful making changes up to 10 = very confident. 10       Expected Engagement: good    GOALS:  1. Continue working on  fluids from meals and for 30 minutes after. Aiming for consistency. Recommend keeping milk out of sight until after meal.  2. Continue working on small, frequent sips.   3. Continue working on decreasing gum chewing  4. Continue decreasing carbonation  3. Recommend scheduling with Lauren Bloch, Pharm D for med review prior to surgery. Number: [phone]     Goals after surgery:   1. Follow the bariatric post-op diet advancement schedule (see below)  2. Sip on 48-64 oz (or greater) fluids daily, recording intake to help stay on-track.  - Drink at least 1-2 oz of fluid every 15-30 min.  3. Stop vitamins/minerals 1 week before surgery. We will discuss starting a chewable multivitamin at the one week post-op visit.   4. Work towards consuming 60 gm protein daily.     Post-op Diet Advancement Schedule:  Clear Liquid Diet (stage 1):   Low-Fat Full Liquid Diet (stage 2):   Pureed Diet (stage 3):   Soft Diet (stage 4):   Regular Diet (stage 5):     Meal Replacement Shake Options:   *Protein Shake Criteria: no more than 210 Calories, at least 20 grams of protein, and less than 10 grams of sugar   [facility] smoothie (160 Calories, 20 g protein)   Premier Protein (160 Calories, 30 g protein)  Slim Fast Advanced Nutrition (180 Calories, 20 g protein)  Muscle Milk, lactose-free, 17 oz bottle (210 Calories, 30 g protein)  Integrated Supplements, no artificial sugars (110 Calories, 20 g protein)  Genepro, unflavored protein powder (60 Calories, 30 g protein)  Boost/Ensure Max (160 calories, 30 gm protein)    [facility] Core Power (170 calories, 26 gm protein)  Aldi's Elevation Protein Powder (180 calories, 30 gm protein)     Chewable Multivitamin Options for After Surgery:  Bariatric Advantage Advanced Multi EA chewable: https://www.bariatricadvantage.com/item/chewable-advanced-multi-ea  Take 2 chews daily. Additional calcium citrate supplement needed.  - Discount code for Bariatric Advantage vitamin/mineral supplements: UOFMINN (for 15% off order)     Rawbotsrabesomebody. Multi Complete 45: https://Mr Banana/products/yhono-uikilviu-72?lqraynk=[email or web address]  Take 2 chews daily. Additional calcium citrate supplement needed.    [place]'s Complete, or generic (with 10 mg iron per chew)   (https://www.EnhanceWorks.Progressus/p/kids-39-complete-multivitamin-chewable-tablets-orange-grape-38-cherry-150ct-up-38-up-8482/-/A-34809293#lnk=sametab)   Take 2 chews per day. Additional B12 and calcium citrate supplements needed. Potential need for additional iron supplement (if needing more than 20 mg iron per day)    Bariatric Fusion: https://www.bariatricfusion.com/collections/bariatric-multivitamins    Take 2 chews twice per day.    Optifast Bariatric Multivitamin  https://www.[employer]ics.Progressus/optifastr-post-bariatric-chewable-vitamin-and-mineral-supplement.html  Take 2 chews twice per day. Additional iron supplement needed (take at separate time from multivitamins)       Post-op Diet Handouts:  Diet Guidelines after Weight-loss Surgery  [email or web address]     Your Stage 1 Diet: Clear Liquids  [email or web address]     Your Stage 2 Diet: Low-fat Full Liquids  [email or web address]://[email or web address]     Your Stage 3 Diet: Pureed Foods  [email or web address]://[email or web address]     Pureed Pleasures  [email or web address]://[email or web address]    Your Stage 4 Diet: Soft Foods  [email or web address]://[email or web address]    Your Stage 5 Diet: Regular Foods  [email or web address]://[email or web address]    Supplements after Weight Loss Surgery  [email or web address]://[employer]/[email or web address]     The  Plate Method  [email or web address]://[email or web address]    Protein Sources for Weight Loss  [email or web address]://[email or web address]/[email or web address]     Carbohydrates  [email or web address]://[email or web address]     Mindful Eating  [email or web address]://Playto/[email or web address]     Summary of Volumetrics Eating Plan  [email or web address]://[email or web address]/[email or web address]     Diet Guidelines after Weight Loss Surgery  [email or web address]://[email or web address]     Seated Exercises for Arms and Legs (can be done before or after surgery)  [email or web address]    Follow up:   Thursday, June 29th at 2:30 pm    Time spent with patient: minutes.  JOHN Talamantes, RD, LD

## 2023-05-09 NOTE — PATIENT INSTRUCTIONS
GOALS:  1. Continue working on  fluids from meals and for 30 minutes after. Aiming for consistency. Recommend keeping milk out of sight until after meal.  2. Continue working on small, frequent sips.   3. Continue working on decreasing gum chewing  4. Continue decreasing carbonation  3. Recommend scheduling with Lauren Bloch, Pharm D for med review prior to surgery. Number: [phone]     Goals after surgery:   1. Follow the bariatric post-op diet advancement schedule (see below)  2. Sip on 48-64 oz (or greater) fluids daily, recording intake to help stay on-track.  - Drink at least 1-2 oz of fluid every 15-30 min.  3. Stop vitamins/minerals 1 week before surgery. We will discuss starting a chewable multivitamin at the one week post-op visit.   4. Work towards consuming 60 gm protein daily.     Post-op Diet Advancement Schedule:  Clear Liquid Diet (stage 1):   Low-Fat Full Liquid Diet (stage 2):   Pureed Diet (stage 3):   Soft Diet (stage 4):   Regular Diet (stage 5):     Meal Replacement Shake Options:   *Protein Shake Criteria: no more than 210 Calories, at least 20 grams of protein, and less than 10 grams of sugar   [facility] smoothie (160 Calories, 20 g protein)   Premier Protein (160 Calories, 30 g protein)  Slim Fast Advanced Nutrition (180 Calories, 20 g protein)  Muscle Milk, lactose-free, 17 oz bottle (210 Calories, 30 g protein)  Integrated Supplements, no artificial sugars (110 Calories, 20 g protein)  Genepro, unflavored protein powder (60 Calories, 30 g protein)  Boost/Ensure Max (160 calories, 30 gm protein)   [employer] Core Power (170 calories, 26 gm protein)  Aldi's Elevation Protein Powder (180 calories, 30 gm protein)     Chewable Multivitamin Options for After Surgery:  Bariatric Advantage Advanced Multi EA chewable: https://www.bariatricadvantage.com/item/chewable-advanced-multi-ea  Take 2 chews daily. Additional calcium citrate supplement needed.  - Discount code for Bariatric  Advantage vitamin/mineral supplements: UOFMINN (for 15% off order)     Celebrate Multi Complete 45: https://Welcome Real-time.Edvert/products/itpjw-habtmuew-19?[email or web address]  Take 2 chews daily. Additional calcium citrate supplement needed.    [place]'s Complete, or generic (with 10 mg iron per chew)   (https://www.Referrizer.Edvert/p/kids-39-complete-multivitamin-chewable-tablets-orange-grape-38-cherry-150ct-up-38-up-8482/-/A-76434458#lnk=sametab)   Take 2 chews per day. Additional B12 and calcium citrate supplements needed. Potential need for additional iron supplement (if needing more than 20 mg iron per day)    Bariatric Fusion: https://www.bariatricfusion.com/collections/bariatric-multivitamins    Take 2 chews twice per day.    Optifast Bariatric Multivitamin  https://www.C4M/optifastr-post-bariatric-chewable-vitamin-and-mineral-supplement.html  Take 2 chews twice per day. Additional iron supplement needed (take at separate time from multivitamins)       Post-op Diet Handouts:  Diet Guidelines after Weight-loss Surgery  [email or web address]://[email or web address]     Your Stage 1 Diet: Clear Liquids  [email or web address]://[email or web address]     Your Stage 2 Diet: Low-fat Full Liquids  [email or web address]://[email or web address]     Your Stage 3 Diet: Pureed Foods  [email or web address]://[email or web address]/[email or web address]     Pureed Pleasures  http://[employer]/966379.pdf    Your Stage 4 Diet: Soft Foods  [email or web address]://[email or web address]/009087.[email or web address]    Your Stage 5 Diet: Regular Foods  [email or web address]://[email or web address]/466475.[email or web address]    Supplements after Weight Loss Surgery  http://[employer]/322339.pdf     The Plate Method  [email or web address]://[email or web address]    Protein Sources for Weight Loss  [email or web address]://[email or web address]/[email or web address]     Carbohydrates  [email or web address]://[email or web address]/[email or web address]     Mindful Eating  http://[employer]/980102.pdf     Summary of Volumetrics Eating Plan  [email or web address]://[email or web address]/[email or web address]     Diet Guidelines after Weight Loss Surgery  [email or web address]://[email or web address]/[email or web address]     Seated Exercises  for Arms and Legs (can be done before or after surgery)  [email or web address]    Follow up:   Thursday, June 29th at 2:30 pm    JOHN Lemus, RD, LD  Clinic #: [phone]

## 2023-05-24 ENCOUNTER — TELEPHONE (OUTPATIENT)
Dept: ENDOCRINOLOGY | Facility: CLINIC | Age: 58
End: 2023-05-24
Payer: COMMERCIAL

## 2023-06-01 ENCOUNTER — VIRTUAL VISIT (OUTPATIENT)
Dept: ENDOCRINOLOGY | Facility: CLINIC | Age: 58
End: 2023-06-01
Payer: COMMERCIAL

## 2023-06-01 VITALS — HEIGHT: 62 IN | WEIGHT: 228 LBS | BODY MASS INDEX: 41.96 KG/M2

## 2023-06-01 DIAGNOSIS — E66.813 CLASS 3 SEVERE OBESITY WITH SERIOUS COMORBIDITY AND BODY MASS INDEX (BMI) OF 45.0 TO 49.9 IN ADULT, UNSPECIFIED OBESITY TYPE (H): Primary | ICD-10-CM

## 2023-06-01 DIAGNOSIS — E66.01 CLASS 3 SEVERE OBESITY WITH SERIOUS COMORBIDITY AND BODY MASS INDEX (BMI) OF 45.0 TO 49.9 IN ADULT, UNSPECIFIED OBESITY TYPE (H): Primary | ICD-10-CM

## 2023-06-01 PROCEDURE — 99214 OFFICE O/P EST MOD 30 MIN: CPT | Mod: VID | Performed by: SURGERY

## 2023-06-01 ASSESSMENT — PAIN SCALES - GENERAL: PAINLEVEL: NO PAIN (0)

## 2023-06-01 NOTE — LETTER
6/1/2023       RE: Shira Best  [address] [facility] [address]     Dear Colleague,    Thank you for referring your patient, Shira Best, to the [facility] [place] [facility]. Please see a copy of my visit note below.    Shira Best is a 58 year old who is being evaluated via a billable video visit.      How would you like to obtain your AVS? MyChart  If the video visit is dropped, the invitation should be resent by: Text to cell phone: [phone]  Will anyone else be joining your video visit? No  If patient encounters technical issues they should call [phone]    During this virtual visit the patient is located in [place], patient verifies this as the location during the entirety of this visit.     Video-Visit Details  Video Start Time: 1242    Type of service:  Video Visit    Video End Time:12:59 PM    Originating Location (pt. Location): Home    Distant Location (provider location):  On-site    Platform used for Video Visit: Toñito Villarreal, PAULINE 6/1/2023      9:40 AM      Wt Readings from Last 8 Encounters:   06/01/23 103.4 kg (228 lb)   03/22/23 105.7 kg (233 lb)   02/03/23 108.9 kg (240 lb)   01/12/23 110.2 kg (243 lb)   12/05/22 114.5 kg (252 lb 8 oz)   Highest lifetime weight was 278#, which was in 2015.  Had heart attack.    Was smoking at the time.    Quit smoking in 2009    Dear Dr. Roberto,      Referring provider:     12/3/2022    10:47 AM   --   Who referred you Can't remember his name     I was asked to see the patient regarding obesity by the referring provider above.    I had the pleasure of meeting with your patient Shira Best in our weight loss surgery office.  This patient is a 58 year old female who has been undergoing our thorough preoperative screening process in anticipation of potential bariatric surgery.    At initial evaluation we recorded Shira Best's  "Height: 157.5 cm (5' 2\"), and BMI 46.2 kg/m2.  The patient has been unsuccessful with other methods of permanent weight loss and suffers from multiple weight related medical conditions.  Due to lack of success in achieving weight loss through other methods, she is interested in undergoing bariatric surgery.    Has had blood clots; last one was in 2015 at time of heart attack; DVT and PE.        PREVIOUS WEIGHT LOSS ATTEMPTS:      12/3/2022    10:47 AM   --   I have tried the following methods to lose weight Watching portions or calories    Exercise       CO-MORBIDITIES OF OBESITY INCLUDE:      12/3/2022    10:47 AM   --   I have the following health issues associated with obesity Pre-Diabetes    Heart Disease    High Blood Pressure    High Cholesterol    Polycystic Ovarian Syndrome    GERD (Reflux)    Osteoarthritis (joint disease)       VITALS:  Ht 1.575 m (5' 2\")   Wt 103.4 kg (228 lb)   BMI 41.70 kg/m      PE:  GENERAL: Alert and oriented x3. NAD    RESPIRATORY: Breathing unlabored  Rest of exam deferred due to virtual visit      In summary, she has undergone an appropriate medical evaluation, dietitian evaluation, as well as psychologic screening. The patient appears to be an appropriate candidate for bariatric surgery.    In the office today, I discussed the laparoscopic gastric sleeve surgery.  Risks, benefits and anticipated outcomes were outlined including the risk of death, staple line leak (1-2%), PE, DVT, ulcer, worsening GERD, N/V, stricture, hernia, wound infection, weight regain, and vitamin deficiencies. This patient has a good chance of sustaining permanent weight loss due to this procedure.  This should also allow improvement if not resolution of his/her weight related medical conditions.    At present we are going to present your patient's file for prior authorization to insurance.  Pending prior authorization, I anticipate a surgical date in the near future.  We will keep you updated on any " progress.  If you have questions regarding care please feel free to contact me.  Total time spent in the clinic was 30 minutes with greater than 50% in face-to-face consultation.        Sincerely,    Gerard Higgins MD    Please route or send letter to:  Primary Care Provider (PCP) and Referring Provider

## 2023-06-01 NOTE — NURSING NOTE
"(   Chief Complaint   Patient presents with     New Patient     Discuss surgery type LSG vs RNYGB & plan for Emgality. Meet and gremarleni    )    ( Weight: 103.4 kg (228 lb) (Pt reported) )  ( Height: 157.5 cm (5' 2\") )  ( BMI (Calculated): 41.7 )  (   )  (   )  (   )  (   )  (   )  (   )    (   )  (   )  (   )  (   )  (   )  (   )  (   )    (   Patient Active Problem List   Diagnosis     Coronary artery disease involving native coronary artery of native heart without angina pectoris     Automatic implantable cardioverter-defibrillator in situ     Essential hypertension     Hyperlipidemia     Fibromyalgia     Osteoarthritis of both knees, unspecified osteoarthritis type     Osteoarthritis of one hip, left     Osteopenia of multiple sites     S/P TKR (total knee replacement) using cement, right     Stage 2 moderate COPD by GOLD classification (H)     Stiffness of joints of both hands     Vitamin D deficiency     Migraine without aura and without status migrainosus, not intractable     H/O: hysterectomy     Cataract, left     Arthritis of right acromioclavicular joint     Anxiety state     Allergic rhinitis     Aspirin long-term use     Displacement of lumbar intervertebral disc without myelopathy     Macrocytosis     Class 3 severe obesity with serious comorbidity and body mass index (BMI) of 45.0 to 49.9 in adult (H)     Pre-diabetes    )  (   Current Outpatient Medications   Medication Sig Dispense Refill     acetaminophen (TYLENOL) 325 MG tablet Take 650 mg by mouth       albuterol (PROAIR HFA/PROVENTIL HFA/VENTOLIN HFA) 108 (90 Base) MCG/ACT inhaler Inhale 1-2 puffs into the lungs       aspirin (ASA) 81 MG chewable tablet 81 mg       benzonatate (TESSALON) 200 MG capsule Take 200 mg by mouth as needed       Budeson-Glycopyrrol-Formoterol (BREZTRI AEROSPHERE) 160-9-4.8 MCG/ACT AERO Inhale 2 puffs into the lungs 2 times daily       busPIRone (BUSPAR) 10 MG tablet Take 10 mg by mouth 3 times daily       " butalbital-acetaminophen-caffeine (ESGIC) -40 MG tablet Take 1 tablet by mouth as needed       famotidine (PEPCID) 40 MG tablet Take 1 tablet by mouth At Bedtime       furosemide (LASIX) 20 MG tablet Take 20 mg by mouth daily       gabapentin (NEURONTIN) 300 MG capsule Take 900 mg by mouth 3 times daily       galcanezumab-gnlm (EMGALITY) 120 MG/ML injection Inject 1 auto injector pen every month, rotate injection site locations each month. For monthly maintenance dosing to prevent headaches/migraines.       JARDIANCE 10 MG TABS tablet TAKE 1 TABLET BY MOUTH ONCE DAILY - DO NOT CHEW OR CRUSH       magnesium 250 MG tablet Take 250 mg by mouth       metFORMIN (GLUCOPHAGE XR) 500 MG 24 hr tablet Take 500 mg by mouth       metoprolol succinate ER (TOPROL XL) 25 MG 24 hr tablet TAKE 1 & 1/2 (ONE & ONE-HALF) TABLETS BY MOUTH ONCE DAILY -  DO  NOT  CRUSH  OR  CHEW       nystatin (MYCOSTATIN) 115502 UNIT/GM external powder Apply 60 g topically as needed       pantoprazole (PROTONIX) 40 MG EC tablet Take 40 mg by mouth At Bedtime       potassium chloride ER (K-TAB/KLOR-CON) 10 MEQ CR tablet Take 10 mEq by mouth daily       prochlorperazine (COMPAZINE) 5 MG tablet Take 1-2 tablets by mouth at onset of migraine headache, limit 3 times per week.       riboflavin 100 MG CAPS Take 1 tablet by mouth 2 times daily       rosuvastatin (CRESTOR) 40 MG tablet Take 1 tablet by mouth At Bedtime       sotalol (BETAPACE) 80 MG tablet Take 80 mg by mouth 2 times daily       spironolactone (ALDACTONE) 25 MG tablet Take 1 tablet by mouth daily at 2 pm       topiramate (TOPAMAX) 100 MG tablet Take 100 mg by mouth At Bedtime       traZODone (DESYREL) 150 MG tablet Take 150 mg by mouth At Bedtime       venlafaxine (EFFEXOR) 50 MG tablet Take 50 mg by mouth daily       oxyCODONE (ROXICODONE) 5 MG tablet Take 5 mg by mouth daily as needed (Patient not taking: Reported on 6/1/2023)      )  ( Diabetes Eval:    )    ( Pain Eval:  No Pain  (0) )    ( Wound Eval:       )    (   History   Smoking Status     Former     Types: Cigarettes   Smokeless Tobacco     Never    )    ( Signed By:  Hugo Villarreal, EMT; June 1, 2023; 12:20 PM )

## 2023-06-01 NOTE — PROGRESS NOTES
"Shira Best is a 58 year old who is being evaluated via a billable video visit.      How would you like to obtain your AVS? MyChart  If the video visit is dropped, the invitation should be resent by: Text to cell phone: [phone]  Will anyone else be joining your video visit? No  If patient encounters technical issues they should call [phone]    During this virtual visit the patient is located in [place], patient verifies this as the location during the entirety of this visit.     Video-Visit Details  Video Start Time: 1242    Type of service:  Video Visit    Video End Time:12:59 PM    Originating Location (pt. Location): Home    Distant Location (provider location):  On-site    Platform used for Video Visit: PAULINE Scherer 6/1/2023      9:40 AM      Wt Readings from Last 8 Encounters:   06/01/23 103.4 kg (228 lb)   03/22/23 105.7 kg (233 lb)   02/03/23 108.9 kg (240 lb)   01/12/23 110.2 kg (243 lb)   12/05/22 114.5 kg (252 lb 8 oz)   Highest lifetime weight was 278#, which was in 2015.  Had heart attack.    Was smoking at the time.    Quit smoking in 2009    Dear Dr. Roberto,      Referring provider:     12/3/2022    10:47 AM   --   Who referred you Can't remember his name     I was asked to see the patient regarding obesity by the referring provider above.    I had the pleasure of meeting with your patient Shira Best in our weight loss surgery office.  This patient is a 58 year old female who has been undergoing our thorough preoperative screening process in anticipation of potential bariatric surgery.    At initial evaluation we recorded Shira Best's Height: 157.5 cm (5' 2\"), and BMI 46.2 kg/m2.  The patient has been unsuccessful with other methods of permanent weight loss and suffers from multiple weight related medical conditions.  Due to lack of success in achieving weight loss through other methods, she is interested in undergoing bariatric surgery.    Has had blood clots; " "last one was in 2015 at time of heart attack; DVT and PE.        PREVIOUS WEIGHT LOSS ATTEMPTS:      12/3/2022    10:47 AM   --   I have tried the following methods to lose weight Watching portions or calories    Exercise       CO-MORBIDITIES OF OBESITY INCLUDE:      12/3/2022    10:47 AM   --   I have the following health issues associated with obesity Pre-Diabetes    Heart Disease    High Blood Pressure    High Cholesterol    Polycystic Ovarian Syndrome    GERD (Reflux)    Osteoarthritis (joint disease)       VITALS:  Ht 1.575 m (5' 2\")   Wt 103.4 kg (228 lb)   BMI 41.70 kg/m      PE:  GENERAL: Alert and oriented x3. NAD    RESPIRATORY: Breathing unlabored  Rest of exam deferred due to virtual visit      In summary, she has undergone an appropriate medical evaluation, dietitian evaluation, as well as psychologic screening. The patient appears to be an appropriate candidate for bariatric surgery.    In the office today, I discussed the laparoscopic gastric sleeve surgery.  Risks, benefits and anticipated outcomes were outlined including the risk of death, staple line leak (1-2%), PE, DVT, ulcer, worsening GERD, N/V, stricture, hernia, wound infection, weight regain, and vitamin deficiencies. This patient has a good chance of sustaining permanent weight loss due to this procedure.  This should also allow improvement if not resolution of his/her weight related medical conditions.    At present we are going to present your patient's file for prior authorization to insurance.  Pending prior authorization, I anticipate a surgical date in the near future.  We will keep you updated on any progress.  If you have questions regarding care please feel free to contact me.  Total time spent in the clinic was 30 minutes with greater than 50% in face-to-face consultation.        Sincerely,    Gerard Higgins MD    Please route or send letter to:  Primary Care Provider (PCP) and Referring Provider  "

## 2023-06-05 ENCOUNTER — PREP FOR PROCEDURE (OUTPATIENT)
Dept: ENDOCRINOLOGY | Facility: CLINIC | Age: 58
End: 2023-06-05
Payer: COMMERCIAL

## 2023-06-05 ENCOUNTER — CARE COORDINATION (OUTPATIENT)
Dept: ENDOCRINOLOGY | Facility: CLINIC | Age: 58
End: 2023-06-05
Payer: COMMERCIAL

## 2023-06-05 DIAGNOSIS — E66.01 MORBID OBESITY (H): Primary | ICD-10-CM

## 2023-06-05 RX ORDER — ACETAMINOPHEN 325 MG/1
975 TABLET ORAL ONCE
Status: CANCELLED | OUTPATIENT
Start: 2023-06-05 | End: 2023-06-05

## 2023-06-05 RX ORDER — ENOXAPARIN SODIUM 100 MG/ML
40 INJECTION SUBCUTANEOUS
Status: CANCELLED | OUTPATIENT
Start: 2023-06-05

## 2023-06-05 RX ORDER — CLINDAMYCIN PHOSPHATE 900 MG/50ML
900 INJECTION, SOLUTION INTRAVENOUS SEE ADMIN INSTRUCTIONS
Status: CANCELLED | OUTPATIENT
Start: 2023-06-05

## 2023-06-05 RX ORDER — CLINDAMYCIN PHOSPHATE 900 MG/50ML
900 INJECTION, SOLUTION INTRAVENOUS
Status: CANCELLED | OUTPATIENT
Start: 2023-06-05

## 2023-06-05 RX ORDER — ONDANSETRON 2 MG/ML
4 INJECTION INTRAMUSCULAR; INTRAVENOUS
Status: CANCELLED | OUTPATIENT
Start: 2023-06-05

## 2023-06-05 NOTE — PROGRESS NOTES
Reviewed tasklist over the phone.  Sent final tasklist for 8/29/23 OR with dr Higgins for Sleeve Gastrectomy.    Bariatric Task List    Fax:  Please fax all paperwork to: [phone] -     Status:  Is patient a candidate for bariatric surgery?:  patient is a candidate for bariatric surgery -     Cleared to schedule surgeon consult?:  cleared to schedule surgeon consult -     Status:  surgery evaluation in process -     Surgeon: Dr. Higgins -     Tentative surgery month/year: 8/29/23 -        Insurance: Insurance:  [employer] -      Contact insurance to discuss coverage: Needed -       [employer]: PCP Recommendation and Medical Clearance:    -     HP Referral:    -      Advanced beneficiary notification (ABN) for Medicare patients for RD visits   and surgery:   -      Weight history:   -     Other:    -        Patient Info: Initial Weight:  252 -     Date of Initial Weight/Height:  12/5/2022 -     Goal Weight (lbs):  242 -     Required Weight Loss:  10 -     Surgery Type:  sleeve gastrectomy -     Multidisciplinary Meeting:    -        Dietician Visits: Structured weight loss required by insurance?:    -     Dietician Visit 1:  Completed - 12/6/22, Neyda Campos, [place]onda [place], [place]   Dietician Visit 2:  Completed - 1/17/23, Neyda Campos, [place], [place]   Dietician Visit 3:  Completed - 3/22/2023 - [facility]   Dietician Visit 4:  Completed - 5/9/23. [facility]   Dietician Visit 5:  Needed - 6/29/23 apt. [facility]   Dietician Visit 6:    -     Dietician Visit additional:  Needed - Monthly until surgery - [place]   Clearance from dietician to see surgeon?:    -     Dietician Notes:    -        Psychological Evaluation: Psych eval:  Completed - List and letter sent to patient, 12/7/22, [place] Scheduled for Feb 23rd ES   Therapist letter of support:  Completed - Letter sent to pt 3/27/23 - [place];9/19/22 initial Assessment & Diagnostic Impression Dr Fara Benson, PsyD, LP- available if needed. [facility]   Psychiatrist letter of support:    -    "  Establish care with therapist:    -     Complete eating disorder evaluation:    -     Letter of clearance from therapist/eating disorder program:    -     Other:    -        Lab Work: Complete Blood Count:  Completed -     Comprehensive Metabolic Panel:  Completed -     Vitamin D:  Completed -     PTH:  Completed -     Hgb A1c:  Completed -      Lipids: Completed -      TSH ([facility], [facility], [place] MA): Completed - 2/1/23 bks     Ferritin:   -       Folate:   -       Testosterone, Total and Free:   -     Thiamine:   -     Vitamin A:   -     Vitamin B12:   -     Zinc:   -     C-peptide:   -     H. pylori:    -     MRSA (2 swabs, minimum 48 hours apart):   -     Nicotine Testing:    -     Recheck Vitamin D:   -     Other:    -        Consults/ Clearance Sleep Medicine:    -     Cardiac:  Completed - Letter sent to patient, 3/27/23 - [place]; 1/5/23 cleared by Yaritza Jarquin -via fax on 5/23/23   Pain:   - Possibly, to eval at next visit   Dental:    -     Endocrine:    -     Gastroenterology:    -     Vascular Medicine:    -     Hematology:  Completed - econlt per heme, Letter sent to patient, 12/7/22, [place],12/14/23 Econsult w Jacob Cogan, MD- advised 60mg Lovenox twice daily for 4 weeks post op - [facility]   Medical Weight Management:   -     Physical Therapy/Exercise:    -     Nephrology:    -     Neurology:  Completed - Letter sent to patient, 3/27/23 - [place]; 5/23/23 from  Cheko Fuentes DO. s   Pulmonology:  Completed - Letter sent to patient, 3/27/23 - [place]; 4/27/23 Karla Roberto MD- cleared from a pulmonary perspective, \" She can transition to nebulizer treatments instead of inhaler treatments pre and post-operatively\". bks   Rheumatology:    -     Other:    -     Other:    -     Other:    -        Testing: UGI:    -     EGD:    -     Sleep Study:   -     Other:   -     Other:    -        PCP: Establish care with PCP:  Completed -     Follow up with PCP:    -     PCP letter of support:  Completed - Letter sent to " "patient, 3/27/23 - [place]; 2/1/23 PCP ltr via fax from Karla Roberto MD (Pepe has copy). bks      Stopping Smoking/ Alcohol Use: Quit tobacco use (3 months smoke free)?:    -     Quit date:    -     Quit alcohol use:   -     Quit date:   -     Other:   -     Quit date:   -        Patient Education:  Information Session:  Needed -     Given \"Making your decision\" handout?:  Yes -     Given \"A Roadmap to you Weight Loss Surgery\" handout?: Yes -     Given \"Get Well Loop\" information?: Yes -     Given support group information?:    -     Attended support group?:  Needed -     Support plan in place?:    -     Research consents signed?:    -     Avoid NSAIDS/ Alternate Plan for Pain:   -        Additional Surgery Requirements: Review Coag plan:    -     HgA1c <8:    -     Inpatient pain consult:    -     Final nicotine screen:    -     Dental work complete:    -     Birth control plan:    -     Gallstone prevention plan (Actigall for 6 months postop):   -     Other:   -     Other:   -        Final Tasks:  Before surgery online class:  Needed -     Before surgery online class website link:  [email or web address]   After surgery online class:  Needed -     After surgery online class website link:  [email or web address]   Nurse visit per clinic:  Needed -     History and Physical per clinic:   -     Final labs per clinic: Needed -     Chest xray per clinic:   -     Electrocardiogram (ECG) per clinic:   -     Other:   -        Notes: Change from Get Well Loop to the Weight Loss Surgery Epic Care Companion    Dear Shira Best,    Effective in February 2023, please register for the Weight Loss Surgery Care Companion Pathway through the [employer] mobile julienne or via web browser after you receive an invite.   Information from this care journey can help you be more successful before and after surgery, for up to one year after your surgical procedure. Your Care Companion Pathway through [employer] " can also help answer any questions you might have related to the surgery.    The Pathway has 3 Phases:  Phase 1 starts when you get your Tasklist after your initial consultation with us or when you decide to start preparing for weight loss surgery.  Phase 2 starts when your surgery is scheduled.  Phase 3 starts on the day of discharge from the hospital.    You will be started on Phase 2    A patient can only be connected to one Care Companion journey at any given time.   You can remove or re-enroll yourself from the Weight Loss Surgery Care Companion Pathway by contacting us at [phone].     Your Weight Loss Surgery Team  [facility]  Ph:[phone]    -

## 2023-06-15 ENCOUNTER — TELEPHONE (OUTPATIENT)
Dept: ENDOCRINOLOGY | Facility: CLINIC | Age: 58
End: 2023-06-15
Payer: COMMERCIAL

## 2023-06-15 NOTE — TELEPHONE ENCOUNTER
General Call    Contacts       Type Contact Phone/Fax    06/15/2023 08:18 AM CDT Phone (Incoming) Shira Best (Self) [phone] ()        Reason for Call:  Upcoming appt 8/8    What are your questions or concerns:  Pt is wondering if she can be in [place] for the pre-op virtual class    Could we send this information to you in [facility] or would you prefer to receive a phone call?:   No preference   Okay to leave a detailed message?: Yes at Home number on file [phone] (home)

## 2023-06-28 NOTE — PROGRESS NOTES
"Shira Best is a 57 year old female who is being evaluated via a billable video visit.      The patient has been notified of following:     \"This video visit will be conducted via a call between you and your physician/provider. We have found that certain health care needs can be provided without the need for an in-person physical exam.  This service lets us provide the care you need with a video conversation.  If a prescription is necessary we can send it directly to your pharmacy.  If lab work is needed we can place an order for that and you can then stop by our lab to have the test done at a later time.    Video visits are billed at different rates depending on your insurance coverage.  Please reach out to your insurance provider with any questions.    If during the course of the call the physician/provider feels a video visit is not appropriate, you will not be charged for this service.\"    Patient has given verbal consent for Video visit? Yes  How would you like to obtain your AVS? MyChart  If you are dropped from the video visit, the video invite should be resent to: Text to cell phone: [phone]  Will anyone else be joining your video visit? No  {If patient encounters technical issues they should call [phone]    Video-Visit Details    Type of service:  Video Visit    Video Start Time: 2:29 PM  Video End Time: 2:54 PM     Originating Location (pt. Location): Home    Distant Location (provider location):  Offsite (providers home)    Platform used for Video Visit: [employer]    During this virtual visit the patient is located in [place], patient verifies this as the location during the entirety of this visit.     Return Bariatric Nutrition Consultation Note    Reason For Visit: Nutrition Assessment    Shira Best is a 57 year old presenting today for bariatric nutrition consult and nutrition education regarding clear and low-fat full liquid diet for post-bariatric surgery.  Pt is interested in " "laparoscopic sleeve gastrectomy with Dr. Higgins expected surgery tentatively on 8/29/23.  Patient is accompanied by self.      Patient saw RD 12/6, 1/17, 2/15, 3/21, 5/9/23 and today 6/29/23.    Pt referred by CONCHIS Brandon on December 6, 2022.    CO-MORBIDITIES OF OBESITY INCLUDE:        12/3/2022    10:47 AM   --   I have the following health issues associated with obesity Pre-Diabetes    Heart Disease    High Blood Pressure    High Cholesterol    Polycystic Ovarian Syndrome    GERD (Reflux)    Osteoarthritis (joint disease)       SUPPORT:      12/3/2022    10:47 AM   Support System Reviewed With Patient   Who do you have in your support network that can be available to help you for the first 2 weeks after surgery? Yes my family   Who can you count on for support throughout your weight loss surgery journey? My sister and niece my mom     ANTHROPOMETRICS:  Consult weight: 252 lbs    Estimated body mass index is 41.7 kg/m  as calculated from the following:    Height as of 6/1/23: 1.575 m (5' 2\").    Weight as of 6/1/23: 103.4 kg (228 lb).     Current weight:  226 lbs, pt report from home scale     Required weight loss goal pre-op: -10 lbs from initial consult weight (goal weight 242 lbs or less before surgery)- met        12/3/2022    10:47 AM   --   I have tried the following methods to lose weight Watching portions or calories    Exercise           12/3/2022    10:47 AM   Weight Loss Questions Reviewed With Patient   How long have you been overweight? Since early childhood   Meds:  Metformin   Jardiance     SUPPLEMENT INFORMATION:  None - currently, has been working on looking for one she wants to purchase.     NUTRITION HISTORY:  Per CONCHIS Tierney: Would like to go through surgery because she is unable to lose weight like she had in the past. Has become harder to exercise    Working with [facility] RD- working on portion sizes and decreasing carb portions.     1/17/22: Pt states she lost 10 lbs but " regained d/t sciatica and plantar fascitis. Has been eating smaller portions and eating very slowly. Appetite has decreased.     2/15/23: Pt attended [facility] Nutrition Class    3/21/23: Pt doing well- continues to lose weight. Focused on protein and fluids. EGD scheduled with Dr. Higgins on April 12th.     5/9/23: Reviewed task list. Still waiting on some clearances - were already sent per pt. Will check in with Caitlyn/Pepe and have re-sent if needed.    Curious about what type of surgery is recommended. She really wants RYGB. Defer to Dr. Higgins     Cutting back on carbonation and caffeine. Doing more sugar-free flavored water    Working on pace and smaller portions. Noticing earlier satiety. Not eating after 7 pm    Chews lots of gum but only in office.     June 2023: Not eating after supper time. Eating at last 2-3 meals most days. Protein Shake for 1 of her meals. Likes Greek Yogurt with added fresh fruit. Working on slowing down eating time and chewing food thoroughly.  liquids from meals. Limiting her caffeinated sodas as possible.      Barriers to lifestyle change: Difficulty exercising, previously failed weight loss attempts     Previous goals:  1. Continue working on  fluids from meals and for 30 minutes after. Aiming for consistency. Recommend keeping milk out of sight until after meal. - met, continues   2. Continue working on small, frequent sips - met, continues.   3. Continue working on decreasing gum chewing - continues   4. Continue decreasing carbonation - continues   3. Recommend scheduling with Lauren Bloch, Pharm D for med review prior to surgery. Number: [phone] - met, appointment on 8/7/23        12/3/2022    10:47 AM   Recall Diet Questions Reviewed With Patient   Describe what you typically consume for breakfast (typical or most recent) Bagles, yogurt apple   Describe what you typically consume for lunch (typical or most recent) [place], water flavored, bagel apple.    Describe what you typically consume for supper (typical or most recent) Piece of meat, potato, veggie milk   Describe what you typically consume as snacks (typical or most recent) Chips, crackers ,apples, candy, pop, sweetbread,.   How many ounces of water, or other low calorie drinks, do you drink daily (8 oz=1 glass)? 48 oz   How many ounces of caffeine (coffee, tea, pop) do you drink daily (8 oz=1 glass)? 24 oz   How many ounces of carbonated (pop, beer, sparkling water) drinks do you drinky daily (8 oz=1 glass)? 32 oz   How many ounces of juice, pop, sweet tea, sports drinks, protein drinks, other sweetened drinks, do you drink daily (8 oz=1 glass)? 32 oz   How many ounces of milk do you drink daily (8 oz=1 glass) 8 oz   Please indicate the type of milk 1%   How often do you drink alcohol? Never           12/3/2022    10:47 AM   Eating Habits   Do you have any dietary restrictions? Yes   Do you currently binge eat (eat a large amount of food in a short time)? No   Are you an emotional eater? Yes   Do you get up to eat after falling asleep? No   What foods do you crave? Chocolate salads fruits           12/3/2022    10:47 AM   Dining Out History Reviewed With Patient   How often do you dine out? Rarely.   Where do you dine out? (select all that apply) sit-down restaurants   What types of food do you order when you dine out? tenzin [employer]     EXERCISE:        12/3/2022    10:47 AM   --   How often do you exercise? 1 to 2 times per week   What is the duration of your exercise (in minutes)? 30 Minutes   What types of exercise do you do? home gym   What keeps you from being more active? Pain    I have just had surgery on one or more of my joints      NUTRITION DIAGNOSIS:  Food- and Nutrition-related knowledge deficit r/t lack of prior exposure to information AEB pt scheduled for upcoming bariatric surgery and pt interest in diet education/review    INTERVENTION:  Intervention Provided/Education  Provided/Reviewed previous goals and encouraged patient to continue goals prior to surgery.     Provided instruction on bariatric clear and low-fat full liquid diets.    Provided the following handouts: Diet Guidelines for Bariatric Surgery, Your Stage 1-5 Diet, Keeping Track of Your Fluids, list of recommended vitamin/mineral supplementation after sleeve gastrectomy surgery and RD contact information.         12/3/2022    10:47 AM   Questions Reviewed With Patient   How ready are you to make changes regarding your weight? Number 1 = Not ready at all to make changes up to 10 = very ready. 10   How confident are you that you can change? 1 = Not confident that you will be successful making changes up to 10 = very confident. 10     Expected Engagement: good    GOALS:  Goals after surgery:   1. Follow the bariatric post-op diet advancement schedule (see below)  2. Sip on 48-64 oz (or greater) fluids daily, recording intake to help stay on-track.  - Drink at least 1-2 oz of fluid every 15-30 min.  3. Stop vitamins/minerals 1 week before surgery. We will discuss starting a chewable multivitamin at the one week post-op visit.   4. Work towards consuming 60 gm protein daily.     Post-op Diet Advancement Schedule:  Clear Liquid Diet (stage 1): Start August 28  Low-Fat Full Liquid Diet (stage 2): Start August 30    Post-op Diet Handouts:  Diet Guidelines after Weight-loss Surgery  [email or web address]     Your Stage 1 Diet: Clear Liquids  [email or web address]     Your Stage 2 Diet: Low-fat Full Liquids  [email or web address]     Your Stage 3 Diet: Pureed Foods  [email or web address]     Pureed Recipes  [email or web address]    Your Stage 4 Diet: Soft Foods  [email or web address]    Your Stage 5 Diet: Regular Foods  [email or web address]    Supplements after Sleeve Gastrectomy, Gastric Bypass or Single Anastomosis Duodenal  Switch  [email or web address]    Keeping Track of Fluids  [email or web address]    Protein Supplements for After Surgery:     Unflavored protein powder: Genepro, Isopure (25-30 gm protein per 1 scoop)    You may also use flavored protein powder if you prefer.     Protein shots: https://store.[email or web address]/collections/protein-shots    Pre-made protein shakes (no more than 210 Calories, at least 20 grams of protein, and less than 10 grams of sugar):     Premier Protein (160 Calories, 30 g protein)    Boost/Ensure Max (160 calories, 30 gm protein)     Zeel Core Power (170 calories, 26 gm protein)    Aldi's Elevation Protein Shake (160 calories, 30 gm protein)     Equate Protein Shake (160 calories, 30 gm protein)    Slim Fast Advanced Nutrition (180 Calories, 20 g protein)    Muscle Milk, lactose-free, 17 oz bottle (210 Calories, 30 g protein)    Glucerna Protein Smart (150 citlaly, 30 gm protein)    Clear Protein Drinks:    BiPro    Premier Protein clear    [id number]    M [employer] Protein 15 Concentrate    Bone broth    Beneprotein protein powder mixed with 4-6 oz of fluid    [id number]    Chewable Multivitamin Options for After Surgery:  Bariatric Advantage Advanced Multi EA chewable: https://www.[email or web address]/item/chewable-advanced-multi-ea  Take 2 chews daily. Additional calcium citrate supplement needed.  - Discount code for Bariatric Advantage vitamin/mineral supplements: UOFMINN (for 15% off order)     Celebrate Multi Complete 45: [email or web address]/products/fodgy-fosgvglu-[email or web address]  Take 2 chews daily. Additional calcium citrate supplement needed.    [place]'s Complete, or generic (with 10 mg iron per chew)   (https://[email or web address].MedWhat/p/kids-39-complete-multivitamin-chewable-tablets-orange-grape-38-cherry-150ct-up-38-up-8482/-/[email or web address]#lnk=sametab)   Take 2 chews per day. Additional B12 and calcium citrate supplements needed. Potential need for  additional iron supplement (if needing more than 20 mg iron per day)    Bariatric Fusion: https://www.bariatricfusion.com/collections/bariatric-multivitamins    Take 2 chews twice per day.    Optifast Bariatric Multivitamin  https://www.[employer]/optifastr-post-bariatric-chewable-vitamin-and-mineral-supplement.html  Take 2 chews twice per day. Additional iron supplement needed (take at separate time from multivitamins)     Follow up: August 4    Time spent with patient: 25 minutes.  Tiffany Suero RD, LD  Clinic # [phone]

## 2023-06-29 ENCOUNTER — VIRTUAL VISIT (OUTPATIENT)
Dept: ENDOCRINOLOGY | Facility: CLINIC | Age: 58
End: 2023-06-29
Payer: COMMERCIAL

## 2023-06-29 VITALS — BODY MASS INDEX: 41.59 KG/M2 | HEIGHT: 62 IN | WEIGHT: 226 LBS

## 2023-06-29 DIAGNOSIS — E66.9 OBESITY: ICD-10-CM

## 2023-06-29 DIAGNOSIS — Z71.3 NUTRITIONAL COUNSELING: Primary | ICD-10-CM

## 2023-06-29 PROCEDURE — 99207 PR NO CHARGE LOS: CPT | Mod: VID

## 2023-06-29 PROCEDURE — 97803 MED NUTRITION INDIV SUBSEQ: CPT | Mod: VID

## 2023-06-29 ASSESSMENT — PAIN SCALES - GENERAL: PAINLEVEL: MODERATE PAIN (5)

## 2023-06-29 NOTE — NURSING NOTE
Is the patient currently in the state of [place]? NO  [place]    Visit mode:VIDEO    If the visit is dropped, the patient can be reconnected by: VIDEO VISIT: Text to cell phone: [phone]    Will anyone else be joining the visit? NO      How would you like to obtain your AVS? MyChart    Are changes needed to the allergy or medication list? NO    Reason for visit: RECHECK    Pt states allergies/medications reviewed in mychart/declines further review.    ELAINE Diego on 6/29/2023 at 2:21 PM    .

## 2023-06-29 NOTE — LETTER
"6/29/2023       RE: Shira Best  [address] [facility] [address]     Dear Colleague,    Thank you for referring your patient, Shira Best, to the [facility] WEIGHT MANAGEMENT [facility]IN[facility] [place] at [facility]. Please see a copy of my visit note below.    Shira Best is a 57 year old female who is being evaluated via a billable video visit.      The patient has been notified of following:     \"This video visit will be conducted via a call between you and your physician/provider. We have found that certain health care needs can be provided without the need for an in-person physical exam.  This service lets us provide the care you need with a video conversation.  If a prescription is necessary we can send it directly to your pharmacy.  If lab work is needed we can place an order for that and you can then stop by our lab to have the test done at a later time.    Video visits are billed at different rates depending on your insurance coverage.  Please reach out to your insurance provider with any questions.    If during the course of the call the physician/provider feels a video visit is not appropriate, you will not be charged for this service.\"    Patient has given verbal consent for Video visit? Yes  How would you like to obtain your AVS? MyChart  If you are dropped from the video visit, the video invite should be resent to: Text to cell phone: [phone]  Will anyone else be joining your video visit? No  {If patient encounters technical issues they should call [phone]    Video-Visit Details    Type of service:  Video Visit    Video Start Time: 2:29 PM  Video End Time: 2:54 PM     Originating Location (pt. Location): Home    Distant Location (provider location):  Offsite (providers home)    Platform used for Video Visit: HandInScan    During this virtual visit the patient is located in [place], patient verifies this as the location " "during the entirety of this visit.     Return Bariatric Nutrition Consultation Note    Reason For Visit: Nutrition Assessment    Shira Best is a 57 year old presenting today for bariatric nutrition consult and nutrition education regarding clear and low-fat full liquid diet for post-bariatric surgery.  Pt is interested in laparoscopic sleeve gastrectomy with Dr. Higgins expected surgery tentatively on 8/29/23.  Patient is accompanied by self.      Patient saw RD 12/6, 1/17, 2/15, 3/21, 5/9/23 and today 6/29/23.    Pt referred by CONCHIS Brandon on December 6, 2022.    CO-MORBIDITIES OF OBESITY INCLUDE:        12/3/2022    10:47 AM   --   I have the following health issues associated with obesity Pre-Diabetes    Heart Disease    High Blood Pressure    High Cholesterol    Polycystic Ovarian Syndrome    GERD (Reflux)    Osteoarthritis (joint disease)       SUPPORT:      12/3/2022    10:47 AM   Support System Reviewed With Patient   Who do you have in your support network that can be available to help you for the first 2 weeks after surgery? Yes my family   Who can you count on for support throughout your weight loss surgery journey? My sister and niece my mom     ANTHROPOMETRICS:  Consult weight: 252 lbs    Estimated body mass index is 41.7 kg/m  as calculated from the following:    Height as of 6/1/23: 1.575 m (5' 2\").    Weight as of 6/1/23: 103.4 kg (228 lb).     Current weight:  226 lbs, pt report from home scale     Required weight loss goal pre-op: -10 lbs from initial consult weight (goal weight 242 lbs or less before surgery)- met        12/3/2022    10:47 AM   --   I have tried the following methods to lose weight Watching portions or calories    Exercise           12/3/2022    10:47 AM   Weight Loss Questions Reviewed With Patient   How long have you been overweight? Since early childhood   Meds:  Metformin   Jardiance     SUPPLEMENT INFORMATION:  None - currently, has been working on looking for " one she wants to purchase.     NUTRITION HISTORY:  Per CONCHIS Tierney: Would like to go through surgery because she is unable to lose weight like she had in the past. Has become harder to exercise    Working with [facility]- working on portion sizes and decreasing carb portions.     1/17/22: Pt states she lost 10 lbs but regained d/t sciatica and plantar fascitis. Has been eating smaller portions and eating very slowly. Appetite has decreased.     2/15/23: Pt attended S Nutrition Class    3/21/23: Pt doing well- continues to lose weight. Focused on protein and fluids. EGD scheduled with Dr. Higgins on April 12th.     5/9/23: Reviewed task list. Still waiting on some clearances - were already sent per pt. Will check in with Caitlyn/Pepe and have re-sent if needed.    Curious about what type of surgery is recommended. She really wants RYGB. Defer to Dr. Higgins     Cutting back on carbonation and caffeine. Doing more sugar-free flavored water    Working on pace and smaller portions. Noticing earlier satiety. Not eating after 7 pm    Chews lots of gum but only in office.     June 2023: Not eating after supper time. Eating at last 2-3 meals most days. Protein Shake for 1 of her meals. Likes Greek Yogurt with added fresh fruit. Working on slowing down eating time and chewing food thoroughly.  liquids from meals. Limiting her caffeinated sodas as possible.      Barriers to lifestyle change: Difficulty exercising, previously failed weight loss attempts     Previous goals:  1. Continue working on  fluids from meals and for 30 minutes after. Aiming for consistency. Recommend keeping milk out of sight until after meal. - met, continues   2. Continue working on small, frequent sips - met, continues.   3. Continue working on decreasing gum chewing - continues   4. Continue decreasing carbonation - continues   3. Recommend scheduling with Lauren Bloch, Pharm D for med review prior to surgery. Number:  [phone] - met, appointment on 8/7/23        12/3/2022    10:47 AM   Recall Diet Questions Reviewed With Patient   Describe what you typically consume for breakfast (typical or most recent) Bagles, yogurt apple   Describe what you typically consume for lunch (typical or most recent) Avon, water flavored, bagel apple.   Describe what you typically consume for supper (typical or most recent) Piece of meat, potato, veggie milk   Describe what you typically consume as snacks (typical or most recent) Chips, crackers ,apples, candy, pop, sweetbread,.   How many ounces of water, or other low calorie drinks, do you drink daily (8 oz=1 glass)? 48 oz   How many ounces of caffeine (coffee, tea, pop) do you drink daily (8 oz=1 glass)? 24 oz   How many ounces of carbonated (pop, beer, sparkling water) drinks do you drinky daily (8 oz=1 glass)? 32 oz   How many ounces of juice, pop, sweet tea, sports drinks, protein drinks, other sweetened drinks, do you drink daily (8 oz=1 glass)? 32 oz   How many ounces of milk do you drink daily (8 oz=1 glass) 8 oz   Please indicate the type of milk 1%   How often do you drink alcohol? Never           12/3/2022    10:47 AM   Eating Habits   Do you have any dietary restrictions? Yes   Do you currently binge eat (eat a large amount of food in a short time)? No   Are you an emotional eater? Yes   Do you get up to eat after falling asleep? No   What foods do you crave? Chocolate salads fruits           12/3/2022    10:47 AM   Dining Out History Reviewed With Patient   How often do you dine out? Rarely.   Where do you dine out? (select all that apply) sit-down restaurants   What types of food do you order when you dine out? tenzin Vizcarra Chinese     EXERCISE:        12/3/2022    10:47 AM   --   How often do you exercise? 1 to 2 times per week   What is the duration of your exercise (in minutes)? 30 Minutes   What types of exercise do you do? home gym   What keeps you from being more active?  Pain    I have just had surgery on one or more of my joints      NUTRITION DIAGNOSIS:  Food- and Nutrition-related knowledge deficit r/t lack of prior exposure to information AEB pt scheduled for upcoming bariatric surgery and pt interest in diet education/review    INTERVENTION:  Intervention Provided/Education Provided/Reviewed previous goals and encouraged patient to continue goals prior to surgery.     Provided instruction on bariatric clear and low-fat full liquid diets.    Provided the following handouts: Diet Guidelines for Bariatric Surgery, Your Stage 1-5 Diet, Keeping Track of Your Fluids, list of recommended vitamin/mineral supplementation after sleeve gastrectomy surgery and RD contact information.         12/3/2022    10:47 AM   Questions Reviewed With Patient   How ready are you to make changes regarding your weight? Number 1 = Not ready at all to make changes up to 10 = very ready. 10   How confident are you that you can change? 1 = Not confident that you will be successful making changes up to 10 = very confident. 10     Expected Engagement: good    GOALS:  Goals after surgery:   1. Follow the bariatric post-op diet advancement schedule (see below)  2. Sip on 48-64 oz (or greater) fluids daily, recording intake to help stay on-track.  - Drink at least 1-2 oz of fluid every 15-30 min.  3. Stop vitamins/minerals 1 week before surgery. We will discuss starting a chewable multivitamin at the one week post-op visit.   4. Work towards consuming 60 gm protein daily.     Post-op Diet Advancement Schedule:  Clear Liquid Diet (stage 1): Start August 28  Low-Fat Full Liquid Diet (stage 2): Start August 30    Post-op Diet Handouts:  Diet Guidelines after Weight-loss Surgery  [email or web address]     Your Stage 1 Diet: Clear Liquids  [email or web address]     Your Stage 2 Diet: Low-fat Full Liquids  [email or web address]     Your Stage 3 Diet: Pureed Foods  [email or web address]      Pureed Recipes  [email or web address]    Your Stage 4 Diet: Soft Foods  [email or web address]    Your Stage 5 Diet: Regular Foods  [email or web address]    Supplements after Sleeve Gastrectomy, Gastric Bypass or Single Anastomosis Duodenal Switch  [email or web address]    Keeping Track of Fluids  [email or web address]    Protein Supplements for After Surgery:   Unflavored protein powder: Genepro, Isopure (25-30 gm protein per 1 scoop)  You may also use flavored protein powder if you prefer.   Protein shots: https://store.MAR Systems.Sub10 Systems/collections/protein-shots  Pre-made protein shakes (no more than 210 Calories, at least 20 grams of protein, and less than 10 grams of sugar):   Premier Protein (160 Calories, 30 g protein)  Boost/Ensure Max (160 calories, 30 gm protein)   Call Loop Core Power (170 calories, 26 gm protein)  Aldi's Elevation Protein Shake (160 calories, 30 gm protein)   Equate Protein Shake (160 calories, 30 gm protein)  Slim Fast Advanced Nutrition (180 Calories, 20 g protein)  Muscle Milk, lactose-free, 17 oz bottle (210 Calories, 30 g protein)  Glucerna Protein Smart (150 citlaly, 30 gm protein)  Clear Protein Drinks:  BiPro  Premier Protein clear  [id number]  M Health Protein 15 Concentrate  Bone broth  Beneprotein protein powder mixed with 4-6 oz of fluid  [id number]    Chewable Multivitamin Options for After Surgery:  Bariatric Advantage Advanced Multi EA chewable: https://www.bariatricAkella.Sub10 Systems/item/chewable-advanced-multi-ea  Take 2 chews daily. Additional calcium citrate supplement needed.  - Discount code for Bariatric Advantage vitamin/mineral supplements: UOFMINN (for 15% off order)     Celebrate Multi Complete 45: https://Saylent Technologies.Sub10 Systems/products/ekjkl-rytpdrdx-15?emozlam=[id number]  Take 2 chews daily. Additional calcium citrate supplement needed.    Nocatee's Complete, or generic (with 10 mg iron per chew)    (https://www.Invincea.Key Ring/p/kids-39-complete-multivitamin-chewable-tablets-orange-grape-38-cherry-150ct-up-38-up-8482/-/A-83008773#lnk=sametab)   Take 2 chews per day. Additional B12 and calcium citrate supplements needed. Potential need for additional iron supplement (if needing more than 20 mg iron per day)    Bariatric Fusion: https://www.bariatricfusion.com/collections/bariatric-multivitamins    Take 2 chews twice per day.    Optifast Bariatric Multivitamin  https://www.Pijon.Key Ring/optifastr-post-bariatric-chewable-vitamin-and-mineral-supplement.html  Take 2 chews twice per day. Additional iron supplement needed (take at separate time from multivitamins)     Follow up: August 4    Time spent with patient: 25 minutes.  Tiffany Suero RD, LD  Clinic # [phone]

## 2023-06-29 NOTE — PATIENT INSTRUCTIONS
Robert Silva     Follow-up with DELTA on August 4th. If you feel ready for surgery and no longer want that appointment you can cancel it.     Thank you,    Tiffany Suero, RD, LD  If you would like to schedule or reschedule an appointment with the RD, please call [phone]    Nutrition Goals  1. Follow the bariatric post-op diet advancement schedule (see below)  2. Sip on 48-64 oz (or greater) fluids daily, recording intake to help stay on-track.  - Drink at least 1-2 oz of fluid every 15-30 min.  3. Stop vitamins/minerals 1 week before surgery. We will discuss starting a chewable multivitamin at the one week post-op visit.   4. Work towards consuming 60 gm protein daily.     Post-op Diet Advancement Schedule:  Clear Liquid Diet (stage 1): Start August 28  Low-Fat Full Liquid Diet (stage 2): Start August 30    Post-op Diet Handouts:  Diet Guidelines after Weight-loss Surgery  [email or web address]     Your Stage 1 Diet: Clear Liquids  [email or web address]     Your Stage 2 Diet: Low-fat Full Liquids  [email or web address]     Your Stage 3 Diet: Pureed Foods  [email or web address]     Pureed Recipes  [email or web address]    Your Stage 4 Diet: Soft Foods  [email or web address]    Your Stage 5 Diet: Regular Foods  [email or web address]    Supplements after Sleeve Gastrectomy, Gastric Bypass or Single Anastomosis Duodenal Switch  [email or web address]    Keeping Track of Fluids  [email or web address]    Protein Supplements for After Surgery:   Unflavored protein powder: Genepro, Isopure (25-30 gm protein per 1 scoop)  You may also use flavored protein powder if you prefer.   Protein shots: https://store.Keego.Telsima/collections/protein-shots  Pre-made protein shakes (no more than 210 Calories, at least 20 grams of protein, and less than 10 grams of sugar):   Premier Protein (160 Calories, 30 g protein)  Boost/Ensure Max (160 calories, 30 gm  protein)   Fairlife Core Power (170 calories, 26 gm protein)  Aldi's Elevation Protein Shake (160 calories, 30 gm protein)   Equate Protein Shake (160 calories, 30 gm protein)  Slim Fast Advanced Nutrition (180 Calories, 20 g protein)  Muscle Milk, lactose-free, 17 oz bottle (210 Calories, 30 g protein)  Glucerna Protein Smart (150 citlaly, 30 gm protein)  Clear Protein Drinks:  BiPro  Premier Protein clear  [id number]  M Health Protein 15 Concentrate  Bone broth  Beneprotein protein powder mixed with 4-6 oz of fluid  [id number]    Chewable Multivitamin Options for After Surgery:  Bariatric Advantage Advanced Multi EA chewable: https://www.bariatricadGateway Development Group.Cista System/item/chewable-advanced-multi-ea  Take 2 chews daily. Additional calcium citrate supplement needed.  - Discount code for Bariatric Advantage vitamin/mineral supplements: UOFMINN (for 15% off order)     Egully Multi Complete 45: https://Tilkee/products/mwgud-geafhjqd-87?veqvoze=[email or web address]  Take 2 chews daily. Additional calcium citrate supplement needed.    Kapolei's Complete, or generic (with 10 mg iron per chew)   (https://www.Evolve IP.Cista System/p/kids-39-complete-multivitamin-chewable-tablets-orange-grape-38-cherry-150ct-up-38-up-8482/-/A-50646308#lnk=sametab)   Take 2 chews per day. Additional B12 and calcium citrate supplements needed. Potential need for additional iron supplement (if needing more than 20 mg iron per day)    Bariatric Fusion: https://www.bariatricfusion.com/collections/bariatric-multivitamins    Take 2 chews twice per day.    Optifast Bariatric Multivitamin  https://www.LOGIC DEVICES.Cista System/optifastr-post-bariatric-chewable-vitamin-and-mineral-supplement.html  Take 2 chews twice per day. Additional iron supplement needed (take at separate time from multivitamins)       COMPREHENSIVE WEIGHT MANAGEMENT PROGRAM  VIRTUAL SUPPORT GROUPS    For Support Group Information:      We offer support groups for patients who are working  "on weight loss and considering, preparing for or have had weight loss surgery.   There is no cost for this opportunity.  You are invited to attend the?Virtual Support Groups?provided by any of the following locations:    [facility] via Microsoft Teams with Gemini Jay RN  2.   [place] via Voices with Daren Kline, PhD, LP  3.   [place] via Voices with Georgia Abrams RN  4.   [facility] via Frameri Teams with Georgia Cosby [facility]    The following Support Group information can also be found on our website:  https://www.[facility].org/treatments/weight-loss-surgery-support-groups    https://www.[facility] HospitalNetworking[facility]ho[facility].comUniversity [facility]als [facility] Center.org/treatments/weight-loss-and-weight-loss-surgery-support-groups    [facility] Weight Loss Surgery Support Group    [facility] Weight Loss Surgery Support Group  The support group is a patient-lead forum that meets monthly to share experiences, encouragement and education. It is open to those who have had weight loss surgery, are scheduled for surgery, or are considering surgery.   WHEN: This group meets on the 3rd Wednesday of each month from 5:00PM - 6:00PM virtually using Microsoft Teams.   FACILITATOR: Led by Gemini Epperson RD, HETAL, RN, the program's Clinical Coordinator.   TO REGISTER: Please contact the clinic via [employer] or call the nurse line directly at [phone] to inform our staff that you would like an invite sent to you and to let us know the email you would like the invite sent to. Prior to the meeting, a link with directions on how to join the meeting will be sent to you.    2023 Meetings   April 19: Guest Speaker, Orladno Lynne RD, LD, \"Maintaining Weight Loss after Bariatric Surgery\".  May 17: \"Let's Talk\" a time for the group to share.  June 21: \"Let's Talk\" a time for the group to share.  July 19  August 16  September 20  October 18  November 15  December 20    [facility] " "Support Groups    Connections Bariatric Care Support Group?  This is open to all pre- and post- operative bariatric surgery patients as well as their support system.   WHEN: This group meets the 2nd Tuesday of each month from 6:30 PM - 8:00 PM virtually using Microsoft Teams.   FACILITATOR: Led by Daren Kline, Ph.D who is a Licensed Psychologist with the [facility] Comprehensive Weight Management Program.   TO REGISTER: Please send an email to Daren Kline, Ph.D., LP at?[email or web address]?if you would like an invitation to the group and to learn about using Microsoft Teams.    2023 Meetings  April 11: Guest speaker, Conchita Marrufo MD, Bariatrician, [facility] Comprehensive Weight Management Program, \"Injectable Weight Loss Medications\".  May 9  Ivelisse 13  July 11  August 8  September 12  October 10  November 14  December 12    Connections Post-Operative Bariatric Surgery Support Group  This is a support group for [facility] bariatric patients (and those external to [facility]) who have had bariatric surgery and are at least 3 months post-surgery.  WHEN: This support group meets the 4th Wednesday of the month from 11:00 AM - 12:00 PM virtually using Microsoft Teams.   FACILITATOR: Led by Certified Bariatric Nurse, Georgia Abrams RN.   TO REGISTER: Please send an email to Georgia at [email or web address] if you would like an invitation to the group and to learn about using Microsoft Teams.    2023 Meetings  April 26  May 24  Ivelisse 28  July 26  August 23  September 27  October 25  November 22  December 27    [facility]   Healthy Lifestyle Virtual Support Group    Healthy Lifestyle Virtual Support Group?  This is 60 minutes of small group guided discussion, support and resources. All are welcome who want a healthy lifestyle.  WHEN: This group meets monthly on a Friday from 12:30 PM - 1:30 PM virtually using Microsoft Teams.  This group will meet " the first Friday of the month beginning In July  FACILITATOR: Led by National Board Certified Health and , Georgia Cosby [facility].   TO REGISTER: Please send an email to Georgia [email or web address] to receive monthly invites to the group or if you have any questions about having a health .  Prior to the meeting, a link with directions on how to join the meeting will be sent to you.    2023 Meetings  May 19: Let's Talk  June 9: Create Your Coaching Toolkit: Learn How to  Yourself  July 7: Let's Talk  August 4: Benefits of Fiber with DELTA Mckeon  September 1: Show and Tell (share your aps, podcasts, recipes, hacks, books)  October 6 :Let's Talk  November 3: Introduction to Mindfulness   December 1: Let's Talk

## 2023-07-03 ENCOUNTER — TRANSFERRED RECORDS (OUTPATIENT)
Dept: HEALTH INFORMATION MANAGEMENT | Facility: CLINIC | Age: 58
End: 2023-07-03
Payer: COMMERCIAL

## 2023-07-12 NOTE — TELEPHONE ENCOUNTER
FUTURE VISIT INFORMATION      SURGERY INFORMATION:    Date: 23    Location: [facility] or    Surgeon:  Gerard Higgins MD    Anesthesia Type:  general    Procedure: GASTRECTOMY, SLEEVE, LAPAROSCOPIC HERNIORRHAPHY, HIATAL, LAPAROSCOPIC    RECORDS REQUESTED FROM:       Primary Care Provider: Venancio    Pertinent Medical History: CAD, hypertension    Most recent EKG+ Tracin23- Venancio

## 2023-07-31 ENCOUNTER — TELEPHONE (OUTPATIENT)
Dept: ENDOCRINOLOGY | Facility: CLINIC | Age: 58
End: 2023-07-31
Payer: COMMERCIAL

## 2023-07-31 NOTE — TELEPHONE ENCOUNTER
Called patient to let her know she has to contact Bariatric Resource Services at [phone] which is a requirement with [facility]. Asked her to contact them today.

## 2023-08-03 ENCOUNTER — TRANSFERRED RECORDS (OUTPATIENT)
Dept: HEALTH INFORMATION MANAGEMENT | Facility: CLINIC | Age: 58
End: 2023-08-03
Payer: COMMERCIAL

## 2023-08-03 NOTE — PROGRESS NOTES
"Video-Visit Details    Type of service:  Video Visit    Video Start Time: 2:32 PM   Video End Time: 2:44 PM     Originating Location (pt. Location): Home    Distant Location (provider location): Offsite (providers home) [facility] [place]     Platform used for Video Visit: AmWell    Return Bariatric Nutrition Consultation Note    Reason For Visit: Nutrition Assessment    Shira Best is a 57 year old presenting today for bariatric nutrition consult and nutrition education regarding clear and low-fat full liquid diet for post-bariatric surgery.  Pt is interested in laparoscopic sleeve gastrectomy with Dr. Higgins expected surgery tentatively on 8/29/23.  Patient is accompanied by self.      Patient saw RD 12/6, 1/17, 2/15, 3/21, 5/9/23, 6/29/23 and today.    Pt referred by CONCHIS Brandon on December 6, 2022.    CO-MORBIDITIES OF OBESITY INCLUDE:        12/3/2022    10:47 AM   --   I have the following health issues associated with obesity Pre-Diabetes    Heart Disease    High Blood Pressure    High Cholesterol    Polycystic Ovarian Syndrome    GERD (Reflux)    Osteoarthritis (joint disease)       SUPPORT:      12/3/2022    10:47 AM   Support System Reviewed With Patient   Who do you have in your support network that can be available to help you for the first 2 weeks after surgery? Yes my family   Who can you count on for support throughout your weight loss surgery journey? My sister and niece my mom     ANTHROPOMETRICS:  Consult weight: 252 lbs    Estimated body mass index is 41.34 kg/m  as calculated from the following:    Height as of 6/29/23: 1.575 m (5' 2\").    Weight as of 6/29/23: 102.5 kg (226 lb).     Current weight:  220 lb per pt report     Required weight loss goal pre-op: -10 lbs from initial consult weight (goal weight 242 lbs or less before surgery)- met        12/3/2022    10:47 AM   --   I have tried the following methods to lose weight Watching portions " or calories    Exercise           12/3/2022    10:47 AM   Weight Loss Questions Reviewed With Patient   How long have you been overweight? Since early childhood   Meds:  Metformin   Jardiance     SUPPLEMENT INFORMATION:  None - currently, has been working on looking for one she wants to purchase.     NUTRITION HISTORY:  Per Neyda Campos PA: Would like to go through surgery because she is unable to lose weight like she had in the past. Has become harder to exercise    Working with [facility]- working on portion sizes and decreasing carb portions.     1/17/22: Pt states she lost 10 lbs but regained d/t sciatica and plantar fascitis. Has been eating smaller portions and eating very slowly. Appetite has decreased.     2/15/23: Pt attended [facility] Nutrition Class    3/21/23: Pt doing well- continues to lose weight. Focused on protein and fluids. EGD scheduled with Dr. Higgins on April 12th.     5/9/23: Reviewed task list. Still waiting on some clearances - were already sent per pt. Will check in with Nehemias and have re-sent if needed.    Curious about what type of surgery is recommended. She really wants RYGB. Defer to Dr. Higgins     Cutting back on carbonation and caffeine. Doing more sugar-free flavored water    Working on pace and smaller portions. Noticing earlier satiety. Not eating after 7 pm    Chews lots of gum but only in office.     June 2023: Not eating after supper time. Eating at last 2-3 meals most days. Protein Shake for 1 of her meals. Likes Greek Yogurt with added fresh fruit. Working on slowing down eating time and chewing food thoroughly.  liquids from meals. Limiting her caffeinated sodas as possible.      August 2023: Started drinking a protein shake and has been drinking more water. Plans on buying the [employer] complete multivitamin for after surgery. She notes she has been exercising more often, has been kayaking.     Barriers to lifestyle change: Difficulty exercising, previously  failed weight loss attempts - improved.     Previous goals:  1. Continue working on  fluids from meals and for 30 minutes after. Aiming for consistency. Recommend keeping milk out of sight until after meal. - met, continues   2. Continue working on small, frequent sips - met, continues.   3. Continue working on decreasing gum chewing - continues   4. Continue decreasing carbonation - continues   3. Recommend scheduling with Lauren Bloch, Pharm D for med review prior to surgery. Number: [phone] - met, appointment on 8/7/23        12/3/2022    10:47 AM   Recall Diet Questions Reviewed With Patient   Describe what you typically consume for breakfast (typical or most recent) Bagles, yogurt apple   Describe what you typically consume for lunch (typical or most recent) [place], water flavored, bagel apple.   Describe what you typically consume for supper (typical or most recent) Piece of meat, potato, veggie milk   Describe what you typically consume as snacks (typical or most recent) Chips, crackers ,apples, candy, pop, sweetbread,.   How many ounces of water, or other low calorie drinks, do you drink daily (8 oz=1 glass)? 48 oz   How many ounces of caffeine (coffee, tea, pop) do you drink daily (8 oz=1 glass)? 24 oz   How many ounces of carbonated (pop, beer, sparkling water) drinks do you drinky daily (8 oz=1 glass)? 32 oz   How many ounces of juice, pop, sweet tea, sports drinks, protein drinks, other sweetened drinks, do you drink daily (8 oz=1 glass)? 32 oz   How many ounces of milk do you drink daily (8 oz=1 glass) 8 oz   Please indicate the type of milk 1%   How often do you drink alcohol? Never           12/3/2022    10:47 AM   Eating Habits   Do you have any dietary restrictions? Yes   Do you currently binge eat (eat a large amount of food in a short time)? No   Are you an emotional eater? Yes   Do you get up to eat after falling asleep? No   What foods do you crave? Chocolate salads fruits            12/3/2022    10:47 AM   Dining Out History Reviewed With Patient   How often do you dine out? Rarely.   Where do you dine out? (select all that apply) sit-down restaurants   What types of food do you order when you dine out? Pizza, steak, Chinese     EXERCISE:        12/3/2022    10:47 AM   --   How often do you exercise? 1 to 2 times per week   What is the duration of your exercise (in minutes)? 30 Minutes   What types of exercise do you do? home gym   What keeps you from being more active? Pain    I have just had surgery on one or more of my joints      NUTRITION DIAGNOSIS:  Food- and Nutrition-related knowledge deficit r/t lack of prior exposure to information AEB pt scheduled for upcoming bariatric surgery and pt interest in diet education/review    INTERVENTION:  Intervention Provided/Education Provided/Reviewed previous goals and encouraged patient to continue goals prior to surgery.     Provided instruction on bariatric clear and low-fat full liquid diets.    Provided the following handouts: Diet Guidelines for Bariatric Surgery, Your Stage 1-5 Diet, Keeping Track of Your Fluids, list of recommended vitamin/mineral supplementation after sleeve gastrectomy surgery and RD contact information.         12/3/2022    10:47 AM   Questions Reviewed With Patient   How ready are you to make changes regarding your weight? Number 1 = Not ready at all to make changes up to 10 = very ready. 10   How confident are you that you can change? 1 = Not confident that you will be successful making changes up to 10 = very confident. 10     Expected Engagement: good    Goals after surgery:   1. Follow the bariatric post-op diet advancement schedule (see below)  2. Sip on 48-64 oz (or greater) fluids daily, recording intake to help stay on-track.  - Drink at least 1-2 oz of fluid every 15-30 min.  3. Stop vitamins/minerals 1 week before surgery. We will discuss starting a chewable multivitamin at the one week post-op visit.    4. Work towards consuming 60 gm protein daily.     Post-op Diet Advancement Schedule:  Clear Liquid Diet (stage 1): Start August 28  Low-Fat Full Liquid Diet (stage 2): Start August 30  Pureed Diet (stage 3): Start September 12  Soft Diet (stage 4): Start September 26  Regular Diet (stage 5): Start October 24    Post-op Diet Handouts:  Diet Guidelines after Weight-loss Surgery  [email or web address]     Your Stage 1 Diet: Clear Liquids  [email or web address]     Your Stage 2 Diet: Low-fat Full Liquids  [email or web address]     Your Stage 3 Diet: Pureed Foods  [email or web address]     Pureed Recipes  [email or web address]    Your Stage 4 Diet: Soft Foods  [email or web address]    Your Stage 5 Diet: Regular Foods  [email or web address]    Supplements after Sleeve Gastrectomy, Gastric Bypass or Single Anastomosis Duodenal Switch  [email or web address]    Keeping Track of Fluids  [email or web address]    Protein Supplements for After Surgery:   Unflavored protein powder: Genepro, Isopure (25-30 gm protein per 1 scoop)  You may also use flavored protein powder if you prefer.   Protein shots: https://store.Origami Energy.com/collections/protein-shots  Pre-made protein shakes (no more than 210 Calories, at least 20 grams of protein, and less than 10 grams of sugar):   Premier Protein (160 Calories, 30 g protein)  Boost/Ensure Max (160 calories, 30 gm protein)   Fairlife Core Power (170 calories, 26 gm protein)  Aldi's Elevation Protein Shake (160 calories, 30 gm protein)   Equate Protein Shake (160 calories, 30 gm protein)  Slim Fast Advanced Nutrition (180 Calories, 20 g protein)  Muscle Milk, lactose-free, 17 oz bottle (210 Calories, 30 g protein)  Glucerna Protein Smart (150 citlaly, 30 gm protein)  Clear Protein Drinks:  BiPro  Premier Protein clear  [id number]  M Health Protein 15 Concentrate  Bone broth  Beneprotein protein powder mixed with  4-6 oz of fluid  [id number]    Chewable Multivitamin Options for After Surgery:  Bariatric Advantage Advanced Multi EA chewable: https://www.bariatricadvantage.com/item/chewable-advanced-multi-ea  Take 2 chews daily. Additional calcium citrate supplement needed.  - Discount code for Bariatric Advantage vitamin/mineral supplements: UOFMINN (for 15% off order)     Homestay.comraD4P Multi Complete 45: https://Intrinsic LifeSciences/products/frowo-wkqnltbt-33?urxvmbv=[email or web address]  Take 2 chews daily. Additional calcium citrate supplement needed.    [place]'s Complete, or generic (with 10 mg iron per chew)   (https://www.Qio.IMScouting/p/kids-39-complete-multivitamin-chewable-tablets-orange-grape-38-cherry-150ct-up-38-up-8482/-/[email or web address]#lnk=sametab)   Take 2 chews per day. Additional B12 and calcium citrate supplements needed. Potential need for additional iron supplement (if needing more than 20 mg iron per day)    Bariatric Fusion: https://www.bariatricfusion.com/collections/bariatric-multivitamins    Take 2 chews twice per day.    Optifast Bariatric Multivitamin  https://www.[employer]G [employer].IMScouting/optifastr-post-bariatric-chewable-vitamin-and-mineral-supplement.html  Take 2 chews twice per day. Additional iron supplement needed (take at separate time from multivitamins).     Follow up: September 8.    Time spent with patient: 12 minutes.  Tiffany Suero RD, LD  Clinic # [phone]

## 2023-08-04 ENCOUNTER — VIRTUAL VISIT (OUTPATIENT)
Dept: ENDOCRINOLOGY | Facility: CLINIC | Age: 58
End: 2023-08-04
Payer: COMMERCIAL

## 2023-08-04 VITALS — HEIGHT: 62 IN | BODY MASS INDEX: 40.48 KG/M2 | WEIGHT: 220 LBS

## 2023-08-04 DIAGNOSIS — E66.9 OBESITY: ICD-10-CM

## 2023-08-04 DIAGNOSIS — Z71.3 NUTRITIONAL COUNSELING: Primary | ICD-10-CM

## 2023-08-04 PROCEDURE — 97803 MED NUTRITION INDIV SUBSEQ: CPT | Mod: VID

## 2023-08-04 PROCEDURE — 99207 PR NO CHARGE LOS: CPT | Mod: VID

## 2023-08-04 ASSESSMENT — PAIN SCALES - GENERAL: PAINLEVEL: MODERATE PAIN (5)

## 2023-08-04 NOTE — LETTER
8/4/2023       RE: Shira Best  [address] [address]     Dear Colleague,    Thank you for referring your patient, Shira Best, to the [facility] [place] at [facility]. Please see a copy of my visit note below.    Video-Visit Details    Type of service:  Video Visit    Video Start Time: 2:32 PM   Video End Time: 2:44 PM     Originating Location (pt. Location): Home    Distant Location (provider location): Offsite (providers home) [facility] [place]     Platform used for Video Visit: AmWell    Return Bariatric Nutrition Consultation Note    Reason For Visit: Nutrition Assessment    Shira Best is a 57 year old presenting today for bariatric nutrition consult and nutrition education regarding clear and low-fat full liquid diet for post-bariatric surgery.  Pt is interested in laparoscopic sleeve gastrectomy with Dr. Higgins expected surgery tentatively on 8/29/23.  Patient is accompanied by self.      Patient saw RD 12/6, 1/17, 2/15, 3/21, 5/9/23, 6/29/23 and today.    Pt referred by CONCHIS Brandon on December 6, 2022.    CO-MORBIDITIES OF OBESITY INCLUDE:        12/3/2022    10:47 AM   --   I have the following health issues associated with obesity Pre-Diabetes    Heart Disease    High Blood Pressure    High Cholesterol    Polycystic Ovarian Syndrome    GERD (Reflux)    Osteoarthritis (joint disease)       SUPPORT:      12/3/2022    10:47 AM   Support System Reviewed With Patient   Who do you have in your support network that can be available to help you for the first 2 weeks after surgery? Yes my family   Who can you count on for support throughout your weight loss surgery journey? My sister and niece my mom     ANTHROPOMETRICS:  Consult weight: 252 lbs    Estimated body mass index is 41.34 kg/m  as calculated from the following:    Height as of 6/29/23:  "1.575 m (5' 2\").    Weight as of 6/29/23: 102.5 kg (226 lb).     Current weight:  220 lb per pt report     Required weight loss goal pre-op: -10 lbs from initial consult weight (goal weight 242 lbs or less before surgery)- met        12/3/2022    10:47 AM   --   I have tried the following methods to lose weight Watching portions or calories    Exercise           12/3/2022    10:47 AM   Weight Loss Questions Reviewed With Patient   How long have you been overweight? Since early childhood   Meds:  Metformin   Jardiance     SUPPLEMENT INFORMATION:  None - currently, has been working on looking for one she wants to purchase.     NUTRITION HISTORY:  Per CONCHIS Tierney: Would like to go through surgery because she is unable to lose weight like she had in the past. Has become harder to exercise    Working with [facility]- working on portion sizes and decreasing carb portions.     1/17/22: Pt states she lost 10 lbs but regained d/t sciatica and plantar fascitis. Has been eating smaller portions and eating very slowly. Appetite has decreased.     2/15/23: Pt attended [facility] Nutrition Class    3/21/23: Pt doing well- continues to lose weight. Focused on protein and fluids. EGD scheduled with Dr. Higgins on April 12th.     5/9/23: Reviewed task list. Still waiting on some clearances - were already sent per pt. Will check in with Nehemias and have re-sent if needed.    Curious about what type of surgery is recommended. She really wants RYGB. Defer to Dr. Higgins     Cutting back on carbonation and caffeine. Doing more sugar-free flavored water    Working on pace and smaller portions. Noticing earlier satiety. Not eating after 7 pm    Chews lots of gum but only in office.     June 2023: Not eating after supper time. Eating at last 2-3 meals most days. Protein Shake for 1 of her meals. Likes Greek Yogurt with added fresh fruit. Working on slowing down eating time and chewing food thoroughly.  liquids from meals. " Limiting her caffeinated sodas as possible.      August 2023: Started drinking a protein shake and has been drinking more water. Plans on buying the [employer] complete multivitamin for after surgery. She notes she has been exercising more often, has been kayaking.     Barriers to lifestyle change: Difficulty exercising, previously failed weight loss attempts - improved.     Previous goals:  1. Continue working on  fluids from meals and for 30 minutes after. Aiming for consistency. Recommend keeping milk out of sight until after meal. - met, continues   2. Continue working on small, frequent sips - met, continues.   3. Continue working on decreasing gum chewing - continues   4. Continue decreasing carbonation - continues   3. Recommend scheduling with Lauren Bloch, Pharm D for med review prior to surgery. Number: [phone] - met, appointment on 8/7/23        12/3/2022    10:47 AM   Recall Diet Questions Reviewed With Patient   Describe what you typically consume for breakfast (typical or most recent) Bagles, yogurt apple   Describe what you typically consume for lunch (typical or most recent) [place], water flavored, bagel apple.   Describe what you typically consume for supper (typical or most recent) Piece of meat, potato, veggie milk   Describe what you typically consume as snacks (typical or most recent) Chips, crackers ,apples, candy, pop, sweetbread,.   How many ounces of water, or other low calorie drinks, do you drink daily (8 oz=1 glass)? 48 oz   How many ounces of caffeine (coffee, tea, pop) do you drink daily (8 oz=1 glass)? 24 oz   How many ounces of carbonated (pop, beer, sparkling water) drinks do you drinky daily (8 oz=1 glass)? 32 oz   How many ounces of juice, pop, sweet tea, sports drinks, protein drinks, other sweetened drinks, do you drink daily (8 oz=1 glass)? 32 oz   How many ounces of milk do you drink daily (8 oz=1 glass) 8 oz   Please indicate the type of milk 1%   How often do  you drink alcohol? Never           12/3/2022    10:47 AM   Eating Habits   Do you have any dietary restrictions? Yes   Do you currently binge eat (eat a large amount of food in a short time)? No   Are you an emotional eater? Yes   Do you get up to eat after falling asleep? No   What foods do you crave? Chocolate salads fruits           12/3/2022    10:47 AM   Dining Out History Reviewed With Patient   How often do you dine out? Rarely.   Where do you dine out? (select all that apply) sit-down restaurants   What types of food do you order when you dine out? Pizza, steak, Chinese     EXERCISE:        12/3/2022    10:47 AM   --   How often do you exercise? 1 to 2 times per week   What is the duration of your exercise (in minutes)? 30 Minutes   What types of exercise do you do? home gym   What keeps you from being more active? Pain    I have just had surgery on one or more of my joints      NUTRITION DIAGNOSIS:  Food- and Nutrition-related knowledge deficit r/t lack of prior exposure to information AEB pt scheduled for upcoming bariatric surgery and pt interest in diet education/review    INTERVENTION:  Intervention Provided/Education Provided/Reviewed previous goals and encouraged patient to continue goals prior to surgery.     Provided instruction on bariatric clear and low-fat full liquid diets.    Provided the following handouts: Diet Guidelines for Bariatric Surgery, Your Stage 1-5 Diet, Keeping Track of Your Fluids, list of recommended vitamin/mineral supplementation after sleeve gastrectomy surgery and RD contact information.         12/3/2022    10:47 AM   Questions Reviewed With Patient   How ready are you to make changes regarding your weight? Number 1 = Not ready at all to make changes up to 10 = very ready. 10   How confident are you that you can change? 1 = Not confident that you will be successful making changes up to 10 = very confident. 10     Expected Engagement: good    Goals after surgery:   1. Follow  the bariatric post-op diet advancement schedule (see below)  2. Sip on 48-64 oz (or greater) fluids daily, recording intake to help stay on-track.  - Drink at least 1-2 oz of fluid every 15-30 min.  3. Stop vitamins/minerals 1 week before surgery. We will discuss starting a chewable multivitamin at the one week post-op visit.   4. Work towards consuming 60 gm protein daily.     Post-op Diet Advancement Schedule:  Clear Liquid Diet (stage 1): Start August 28  Low-Fat Full Liquid Diet (stage 2): Start August 30  Pureed Diet (stage 3): Start September 12  Soft Diet (stage 4): Start September 26  Regular Diet (stage 5): Start October 24    Post-op Diet Handouts:  Diet Guidelines after Weight-loss Surgery  [email or web address]     Your Stage 1 Diet: Clear Liquids  [email or web address]     Your Stage 2 Diet: Low-fat Full Liquids  [email or web address]     Your Stage 3 Diet: Pureed Foods  [email or web address]     Pureed Recipes  [email or web address]    Your Stage 4 Diet: Soft Foods  [email or web address]    Your Stage 5 Diet: Regular Foods  [email or web address]    Supplements after Sleeve Gastrectomy, Gastric Bypass or Single Anastomosis Duodenal Switch  [email or web address]    Keeping Track of Fluids  [email or web address]    Protein Supplements for After Surgery:   Unflavored protein powder: Genepro, Isopure (25-30 gm protein per 1 scoop)  You may also use flavored protein powder if you prefer.   Protein shots: https://store.bariatricSkiipi.com/collections/protein-shots  Pre-made protein shakes (no more than 210 Calories, at least 20 grams of protein, and less than 10 grams of sugar):   Premier Protein (160 Calories, 30 g protein)  Boost/Ensure Max (160 calories, 30 gm protein)   Fairlife Core Power (170 calories, 26 gm protein)  Aldi's Elevation Protein Shake (160 calories, 30 gm protein)   Equate Protein Shake (160 calories, 30 gm  protein)  Slim Fast Advanced Nutrition (180 Calories, 20 g protein)  Muscle Milk, lactose-free, 17 oz bottle (210 Calories, 30 g protein)  Glucerna Protein Smart (150 citlaly, 30 gm protein)  Clear Protein Drinks:  BiPro  Premier Protein clear  [id number]  M Health Protein 15 Concentrate  Bone broth  Beneprotein protein powder mixed with 4-6 oz of fluid  [id number]    Chewable Multivitamin Options for After Surgery:  Bariatric Advantage Advanced Multi EA chewable: https://www.bariatricadLe Cicogne.Zero Chroma LLC/item/chewable-advanced-multi-ea  Take 2 chews daily. Additional calcium citrate supplement needed.  - Discount code for Bariatric Advantage vitamin/mineral supplements: UOFMINN (for 15% off order)     Celebrate Multi Complete 45: https://Press/products/tfxvf-yhasqtfc-53?[email or web address]  Take 2 chews daily. Additional calcium citrate supplement needed.    Frederic's Complete, or generic (with 10 mg iron per chew)   (https://www.QingCloud.[employer] Chroma [employer]/p/kids-39-complete-multivitamin-chewable-tablets-orange-grape-38-cherry-150ct-up-38-up-8482/-/[email or web address]#lnk=sametab)   Take 2 chews per day. Additional B12 and calcium citrate supplements needed. Potential need for additional iron supplement (if needing more than 20 mg iron per day)    Bariatric Fusion: https://www.bariatricfusion.com/collections/bariatric-multivitamins    Take 2 chews twice per day.    Optifast Bariatric Multivitamin  https://www.CrowdComfort.[employer]/optifastr-post-bariatric-chewable-vitamin-and-mineral-supplement.html  Take 2 chews twice per day. Additional iron supplement needed (take at separate time from multivitamins).     Follow up: September 8.    Time spent with patient: 12 minutes.  Tiffany Suero RD, LD  Clinic # [phone]

## 2023-08-04 NOTE — NURSING NOTE
Is the patient currently in the state of [place]? NO    Visit mode:VIDEO    If the visit is dropped, the patient can be reconnected by: VIDEO VISIT: Text to cell phone: [phone]    Will anyone else be joining the visit? NO      How would you like to obtain your AVS? MyChart    Are changes needed to the allergy or medication list? NO    Reason for visit: RECHECK (Bariatric Nutrition)      Pt is currently in [place].    Nichole Hanson, VF

## 2023-08-04 NOTE — PATIENT INSTRUCTIONS
Robert Silva,     Follow-up with RD on September 8.     Thank you,    Tiffany Suero, RD, LD  If you would like to schedule or reschedule an appointment with the RD, please call [phone]    Nutrition Goals  1. Follow the bariatric post-op diet advancement schedule (see below)  2. Sip on 48-64 oz (or greater) fluids daily, recording intake to help stay on-track.  - Drink at least 1-2 oz of fluid every 15-30 min.  3. Stop vitamins/minerals 1 week before surgery. We will discuss starting a chewable multivitamin at the one week post-op visit.   4. Work towards consuming 60 gm protein daily.     Post-op Diet Advancement Schedule:  Clear Liquid Diet (stage 1): Start August 28  Low-Fat Full Liquid Diet (stage 2): Start August 30  Pureed Diet (stage 3): Start September 12  Soft Diet (stage 4): Start September 26  Regular Diet (stage 5): Start October 24    Post-op Diet Handouts:  Diet Guidelines after Weight-loss Surgery  [email or web address]     Your Stage 1 Diet: Clear Liquids  [email or web address]     Your Stage 2 Diet: Low-fat Full Liquids  [email or web address]     Your Stage 3 Diet: Pureed Foods  [email or web address]     Pureed Recipes  [email or web address]    Your Stage 4 Diet: Soft Foods  [email or web address]    Your Stage 5 Diet: Regular Foods  [email or web address]    Supplements after Sleeve Gastrectomy, Gastric Bypass or Single Anastomosis Duodenal Switch  [email or web address]    Keeping Track of Fluids  [email or web address]    Protein Supplements for After Surgery:   Unflavored protein powder: Genepro, Isopure (25-30 gm protein per 1 scoop)  You may also use flavored protein powder if you prefer.   Protein shots: https://store.Lâ€™ArcoBaleno.InnoVital Systems/collections/protein-shots  Pre-made protein shakes (no more than 210 Calories, at least 20 grams of protein, and less than 10 grams of sugar):   Premier Protein (160 Calories, 30 g  protein)  Boost/Ensure Max (160 calories, 30 gm protein)   Fairlife Core Power (170 calories, 26 gm protein)  Aldi's Elevation Protein Shake (160 calories, 30 gm protein)   Equate Protein Shake (160 calories, 30 gm protein)  Slim Fast Advanced Nutrition (180 Calories, 20 g protein)  Muscle Milk, lactose-free, 17 oz bottle (210 Calories, 30 g protein)  Glucerna Protein Smart (150 citlaly, 30 gm protein)  Clear Protein Drinks:  BiPro  Premier Protein clear  [id number]  M Health Protein 15 Concentrate  Bone broth  Beneprotein protein powder mixed with 4-6 oz of fluid  [id number]    Chewable Multivitamin Options for After Surgery:  Bariatric Advantage Advanced Multi EA chewable: https://www.bariatricadFood and Beverage.Front Flip/item/chewable-advanced-multi-ea  Take 2 chews daily. Additional calcium citrate supplement needed.  - Discount code for Bariatric Advantage vitamin/mineral supplements: UOFMINN (for 15% off order)     AfterYes Multi Complete 45: https://SpunLive/products/kkdnp-thmweleg-77?[email or web address]  Take 2 chews daily. Additional calcium citrate supplement needed.    [place]'s Complete, or generic (with 10 mg iron per chew)   (https://www.ZIIBRA.com/p/kids-39-complete-multivitamin-chewable-tablets-orange-grape-38-cherry-150ct-up-38-up-8482/-/A-90034247#lnk=sametab)   Take 2 chews per day. Additional B12 and calcium citrate supplements needed. Potential need for additional iron supplement (if needing more than 20 mg iron per day)    Bariatric Fusion: https://www.bariatricfusion.com/collections/bariatric-multivitamins    Take 2 chews twice per day.    Optifast Bariatric Multivitamin  https://www.Urban Planet Media [employer] Entertainment.Front Flip/optifastr-post-bariatric-chewable-vitamin-and-mineral-supplement.html  Take 2 chews twice per day. Additional iron supplement needed (take at separate time from multivitamins).       COMPREHENSIVE WEIGHT MANAGEMENT PROGRAM  VIRTUAL SUPPORT GROUPS    For Support Group Information:      We  "offer support groups for patients who are working on weight loss and considering, preparing for or have had weight loss surgery.   There is no cost for this opportunity.  You are invited to attend the?Virtual Support Groups?provided by any of the following locations:    [facility] via Microsoft Teams with Gemini Jay RN  2.   [place] via DermaGen Teams with Daren Kline, PhD, LP  3.   [place] via "[employer] with Georgia Abrams RN  4.   [facility] via DermaGen Teams with Georgia Cosby [facility]    The following Support Group information can also be found on our website:  https://www.[facility] HospitalirInternational [facility].org/treatments/weight-loss-surgery-support-groups    https://www.Bearch.org/treatments/weight-loss-and-weight-loss-surgery-support-groups    [facility] Weight Loss Surgery Support Group    [facility] Weight Loss Surgery Support Group  The support group is a patient-lead forum that meets monthly to share experiences, encouragement and education. It is open to those who have had weight loss surgery, are scheduled for surgery, or are considering surgery.   WHEN: This group meets on the 3rd Wednesday of each month from 5:00PM - 6:00PM virtually using Microsoft Teams.   FACILITATOR: Led by Gemini Epperson RD, HETAL, RN, the program's Clinical Coordinator.   TO REGISTER: Please contact the clinic via Evver or call the nurse line directly at [phone] to inform our staff that you would like an invite sent to you and to let us know the email you would like the invite sent to. Prior to the meeting, a link with directions on how to join the meeting will be sent to you.    2023 Meetings   April 19: Guest Speaker, Orlando Lynne RD, LD, \"Maintaining Weight Loss after Bariatric Surgery\".  May 17: \"Let's Talk\" a time for the group to share.  June 21: \"Let's Talk\" a time for the group to share.  July 19  August 16  September 20  October 18  November 15  December 20    [facility] " "[facility] Support Groups    Connections Bariatric Care Support Group?  This is open to all pre- and post- operative bariatric surgery patients as well as their support system.   WHEN: This group meets the 2nd Tuesday of each month from 6:30 PM - 8:00 PM virtually using Microsoft Teams.   FACILITATOR: Led by Daren Kline, Ph.D who is a Licensed Psychologist with the [facility] Comprehensive Weight Management Program.   TO REGISTER: Please send an email to Darne Kline, Ph.D., LP at?[email or web address]?if you would like an invitation to the group and to learn about using Microsoft Teams.    2023 Meetings  April 11: Guest speaker, Conchita Marrufo MD, Bariatrician, [facility], \"Injectable Weight Loss Medications\".  May 9  Ivelisse 13  July 11  August 8  September 12  October 10  November 14  December 12    Connections Post-Operative Bariatric Surgery Support Group  This is a support group for [facility] bariatric patients (and those external to [facility]) who have had bariatric surgery and are at least 3 months post-surgery.  WHEN: This support group meets the 4th Wednesday of the month from 11:00 AM - 12:00 PM virtually using Microsoft Teams.   FACILITATOR: Led by Certified Bariatric Nurse, Georgia Abrams RN.   TO REGISTER: Please send an email to Georgia at [email or web address] if you would like an invitation to the group and to learn about using Microsoft Teams.    2023 Meetings  April 26  May 24  Ivelisse 28  July 26  August 23  September 27  October 25  November 22  December 27    [facility]   Healthy Lifestyle Virtual Support Group    Healthy Lifestyle Virtual Support Group?  This is 60 minutes of small group guided discussion, support and resources. All are welcome who want a healthy lifestyle.  WHEN: This group meets monthly on a Friday from 12:30 PM - 1:30 PM " virtually using Microsoft Teams.  This group will meet the first Friday of the month beginning In July  FACILITATOR: Led by National Board Certified Health and , Georgia Cosby [facility].   TO REGISTER: Please send an email to Georgia at?[email or web address] to receive monthly invites to the group or if you have any questions about having a health .  Prior to the meeting, a link with directions on how to join the meeting will be sent to you.    2023 Meetings  May 19: Let's Talk  June 9: Create Your Coaching Toolkit: Learn How to  Yourself  July 7: Let's Talk  August 4: Benefits of Fiber with DELTA Mckeon  September 1: Show and Tell (share your aps, podcasts, recipes, hacks, books)  October 6 :Let's Talk  November 3: Introduction to Mindfulness   December 1: Let's Talk

## 2023-08-08 ENCOUNTER — TELEPHONE (OUTPATIENT)
Dept: ENDOCRINOLOGY | Facility: CLINIC | Age: 58
End: 2023-08-08

## 2023-08-08 ENCOUNTER — MYC MEDICAL ADVICE (OUTPATIENT)
Dept: ENDOCRINOLOGY | Facility: CLINIC | Age: 58
End: 2023-08-08

## 2023-08-08 ENCOUNTER — VIRTUAL VISIT (OUTPATIENT)
Dept: ENDOCRINOLOGY | Facility: CLINIC | Age: 58
End: 2023-08-08
Payer: COMMERCIAL

## 2023-08-08 VITALS — WEIGHT: 220 LBS | BODY MASS INDEX: 40.48 KG/M2 | HEIGHT: 62 IN

## 2023-08-08 DIAGNOSIS — Z91.89 AT HIGH RISK FOR POSTOPERATIVE COMPLICATIONS: ICD-10-CM

## 2023-08-08 DIAGNOSIS — E66.01 CLASS 3 SEVERE OBESITY WITH SERIOUS COMORBIDITY AND BODY MASS INDEX (BMI) OF 45.0 TO 49.9 IN ADULT, UNSPECIFIED OBESITY TYPE (H): Primary | ICD-10-CM

## 2023-08-08 DIAGNOSIS — E66.813 CLASS 3 SEVERE OBESITY WITH SERIOUS COMORBIDITY AND BODY MASS INDEX (BMI) OF 45.0 TO 49.9 IN ADULT, UNSPECIFIED OBESITY TYPE (H): Primary | ICD-10-CM

## 2023-08-08 PROCEDURE — 99207 PR NO CHARGE NURSE ONLY: CPT | Mod: VID

## 2023-08-08 ASSESSMENT — PAIN SCALES - GENERAL: PAINLEVEL: MODERATE PAIN (5)

## 2023-08-08 NOTE — TELEPHONE ENCOUNTER
Called [employer] [phone] to check on the status of the prior authorization for a sleeve gastrectomy with Dr. Higgins on 8/29/23. This has been authorized under ref # [id number] effective 08/29/23 - 08/30/23. I notified patient.

## 2023-08-08 NOTE — TELEPHONE ENCOUNTER
Patient talking to nurse with insurance tomorrow.  Informed her to contact [facility] at [phone] if she needs any additional information sent to insurance.  [facility] has submitted an initial request for  the tentative 8/29/23 sleeve gastrectomy.

## 2023-08-09 RX ORDER — HYOSCYAMINE SULFATE 0.125 MG
0.12 TABLET ORAL EVERY 4 HOURS PRN
Qty: 30 TABLET | Refills: 1 | Status: SHIPPED | OUTPATIENT
Start: 2023-08-09 | End: 2023-10-09

## 2023-08-09 RX ORDER — AMOXICILLIN 250 MG
2 CAPSULE ORAL DAILY PRN
Qty: 30 TABLET | Refills: 1 | Status: SHIPPED | OUTPATIENT
Start: 2023-08-09

## 2023-08-09 NOTE — PATIENT INSTRUCTIONS
Shira Best,    Below is a recap of our conversation regarding your upcoming Sleeve Gastrectomy on 8/29/23  at 8 AM with Dr. Yuan Higgins.  You should receive a call from the hospital surgery department 1-2 days before surgery confirming you surgery time and arrival time.  You should arrive at the hospital 2 hours prior to your surgery time.    SURGERY CANCELLATION  If something occurs in which you need to cancel your surgery, please contact [facility] at [phone], as soon as possible.      BEFORE SURGERY    MEDICATIONS TO STOP BEFORE SURGERY  ASPIRIN  - stop 7 days before surgery.  BLOOD THINNERS - Need a bridging plan with your prescribing provider.  NSAIDS - stop 7 days before surgery and do not restart after surgery.  CHRONIC NARCOTIC PAIN MEDICATION  - need a plan with pain provider and surgeon.  HERBAL SUPPLEMENTS - Stop 7 days before surgery.  VITAMINS - Stop 7 days before surgery.    ANTIOBESITY MEDICATIONS TO STOP BEFORE SURGERY  TOPIRAMATE - take the day of surgery in the morning.  NALTREXONE - Stop 4 days before surgery. Do not restart.  CONTRAVE - Taper with 1 tablet twice a day for 1 week, then stop 4 days before surgery.  Do not restart.  QSYMIA (7.5 mg/46 mg) - Stop 7 days before surgery.  If on a larger dose, will need a plan for tapering.  Do not restart.  PHENTERMINE - Stop 7 days before surgery.  Do not restart.  GLP-1 (Victoza, Saxenda)  - Last dose the day before surgery.  Do not restart.  GLP-1 (Ozempic, Wegovy, Trulicity, Mounjaro) - Last dose no later than 1 week before surgery.  Do not restart.  METFORMIN - Last dose the day before surgery.  Do not restart.    MEDICATION REVIEW  FIRST 4 WEEKS POSTOP - Medications should be liquid, chewable, crushed or pills smaller than 1/4 inch.    AFTER 4 WEEKS - Take small pills or cut up larger pills to be smaller than 1/2 inch.  Do not crush or open medications that are long acting or extended relief. Check with your prescribing provider to see if  there is a different form or option.    DAY BEFORE YOUR SURGERY  Starting in the morning, follow a clear liquid diet.  Stay away from red liquids and liquids with pulp.  Ensure you are well hydrated all day!  Strive for at least 64 ounces.  Nothing after midnight except water!  You may have sips of water up to 3 hours before your surgery.   Take your medications as directed from the PAC clinic.  You may have approved medications up to 3 hours before your surgery time.    SHOWER  You will take a shower the night before surgery and a shower the morning of surgery.  Follow instructions for pre-op shower using the required soap (4% CHG,    Hibiclens, Exidine, chlorhexidine).  Let the soap sit on your skin for a minute and then rinse.  After your evening shower, dry off with a clean towel, put on clean pajamas and get into a bed with clean sheets.  After your morning shower, dry off with a clean towel and put on clean comfortable clothes.  The soap can be very drying.  Do not put any deodorant, lotion or powder on after your shower.    TRANSPORTATION & POSTOP CARE  You will need a  to take you to the hospital the day of surgery.  You will need a  to pick you up from the hospital the day of discharge (usually the afternoon/evening the day after surgery).  You will need someone to stay with you for the first couple of days after surgery.  If you live greater than an hour from the hospital, you will need to stop every 50-60 minutes to get out of the car and walk around.         PRESCRIPTIONS FROM YOUR PHARMACY:   Please plan to pick these up before surgery and have them at home for when you are discharged. Do not start taking them until after surgery. Do not bring them to the hospital with you!    Prilosec (omeprazole) OR Alternative:   This medication is for control of stomach acid.  You will take this medication daily for the first three months after surgery.  TO TAKE: Open the capsule and put the beads  in applesauce.  Take every morning.  If you are currently on a medication for GERD or Reflux, you will just continue that medication.    Levsin (hyoscyamine) or Alternative:   This medication is to help control spasms/cramping in the stomach and intestines.    Take one tablet every 4 hours as needed for cramping.    It may be helpful to take in the morning before taking any other medications.    Zofran (ondansetron) or Alternative:    This medication is for nausea.    To Take: Place one tablet on the tongue and let it dissolve.  You can take this every 6 hours, as needed.    Senna:   This medication is a stool softener:  Take as directed to help avoid constipation.  It is important to take this medication if you are taking a narcotic pain medicine as the pain medication can be constipating.    If you experience diarrhea, please stop taking the Senna.      IN THE HOSPITAL  Use your Incentive Spirometer  Get up. You should be up walking at least 3 times (or more) each day while in the hospital.  Trips to the bathroom are great but they are not a long enough distance.  Ask for an abdominal binder for extra abdominal support.  Wear your abdominal as needed, for comfort.  Sip through your fluids!  Frequent sips - about 1 tsp every couple of minutes.  Drinking more than that at a time can contribute to pain and cramping.   Ask for some medicine cups to take home to help you measure your fluids.  Remember to take your abdominal binder and incentive spirometer home with you when you are discharged!!      AFTER SURGERY    REGULARLY SCHEDULED MEDICATIONS    Depending on the size and number of medications you take, you may need to space/change the time you take your medications, so you do not overfill your stomach.  Make sure you follow-up with your primary provider to make medication changes needed.  DIABETES: If you have diabetes, monitor your blood sugars more frequently after surgery.  Your blood sugars may normalize  quickly.  Please contact your prescribing provider if you need your medication adjusted.  HIGH BLOOD PRESSURE: If you have high blood pressure, monitor your blood pressure after surgery.  Your blood pressure may normalize quickly.  Please contact your prescribing provider if you need your medication adjusted.      POSTOPERATIVE MEDICATIONS  Take your postop medications as prescribed.  You can start a chewable Multivitamin when you get home.  Do not start any additional vitamins until you meet with your provider and dietician to receive further instructions.        PAIN CONTROL  Your pain medication prescription at discharge is a different dosage and timing than what you were taking in the hospital!  Follow the new directions.Take your pain medicine as needed, as directed.  Start with the minimal amount prescribed and supplement with Tylenol or Extra Strength Tylenol.  Take the minimal amount as directed for severe pain. The pain medicine can cause constipation.  Do not drive while taking narcotic pain medication.    For mild to moderate pain, you can take Tylenol or Extra Strength Tylenol per the bottle instructions.  DO NOT TAKE NSAIDS: IBUPROFEN, ASPIRIN OR NAPROXEN.  Change positions frequently.  Walking around for 10-15 minutes once an hour will also help.ICE: Use an ice pack on the incisions for the first 24 - 48 hours:  Use a barrier between the ice pack and the skin.  Do not leave the ice on longer than 20 minutes at each time.    HEAT: You may also try a heating pad on your abdomen to help soothe cramping.  Use a barrier between the heating pad and your skin.  Do not leave on longer than 20 minutes at each time.  Wear your abdominal binder as needed.  You will need someone to stay with you for the first 1-2 days after surgery, especially if you are taking prescription pain medicine.  Please share the information regarding fluid intake and activity with your caregiver so they can help support you in your  efforts.  No driving until you have been off narcotic pain medications for at least 24 hours.    DIET  For Dr. Higgins Patients:  You will be following the Stage 2 (Full Liquid Diet) - upon discharge.  Ensure you have a variety of proper full liquids at home to support you in taking in the proper nutrition.  Please refer to the Stage 2 - Full Liquid diet handout provided by the Dietician.  Stage 3 (Pureed) Diet Start Date: 9/12/23  Stage 4 (Soft Foods) Diet Start Date: 9/26/23  Stage 5 (Regular) Diet Start Date: 10/26/23     HYDRATION  Your goal is 48 to 64 ounces each day (4-6 ounces each hour).  This amount will help prevent dehydration.    It is important to measure and keep a tally sheet of your intake for the first month after surgery.  Keep your tally sheet next to you and cherry each time you drink one ounce of fluid.  You should track your fluids for the first month after surgery and if you are ill.  All fluids count in your fluid intake!  Keep a water bottle or thermal bottle by your bed.  Drink a little before going to sleep, if you wake in the middle of the night, and in the morning before getting out of bed.  Keep an eye on your urine.  It is a good indicator of your hydration status.  Your urine should be clear yellow or clear light yellow.    You will learn about advancing to a pureed diet at your first postop Dietician appointment.     BOWELS/CONSTIPATION   Movement is important to keep your bowels working.  Get up and walk at least each hour while you are awake.  It is not unusual to not have a bowel movement for a few days after surgery.  You should be passing gas each day.   Keep taking the SENNA until you have had a regular bowel movement and are off the narcotic pain medication.   If you haven't had a bowel movement by the 4th day after surgery, take one dose of Miralax (according to package directions).  Repeat dose if no results.  If you still don't have any results, use a Fleet Enema.  If still  no results, call your provider's office.   It is normal to have a little blood in your first couple of bowel movements.  If the blood continues, please contact our office.  If you are having gas pains and bloating, you can try Gas-X.    BREATHING EXERCISES  Use your incentive spirometer each hour while awake.  Sitting up or standing, take 5 - 10 slow, deep breaths each time, focusing on expanding your lungs.  This should be done for the first couple of weeks after surgery.  These exercises will help eliminate gases from your system (anesthesia and surgical).      ACTIVITY & EXERCISE  Get up and walk around each hour while you are awake - at least 10 minutes.  Walking will help with circulation, bowel regulation and will help you feel better.  Do not lift anything greater than 10-15 lb for the first 4 weeks.  The only exercise you can start right after surgery is WALKING.  Walking is good for the gut, the circulation and your breathing!   Seated Exercises for Arms and Legs (can be done before or after surgery) [email or web address]  Exercise Guidelines after Weight Loss Surgery (1st 4-6 weeks): [email or web address]  Exercises after Weight Loss Surgery (strengthening, when no weightlifting restrictions - after 4-6 weeks) :  [email or web address]       WOUND CARE  You will have steri-strips over your incision after surgery. They will fall off over the first 7-10 days after surgery.  If the steri strips fall off before your incisions are healed, replace them with a bandaid to pull the incision together.   You may have bruising around your wounds. This is normal. It will go away on its own. The skin around your incisions may be a little red. This is normal, too.   Showering: you may shower the day you get home unless your surgeon tells you differently.  Wash gently around incisions with warm soapy water, rinse well, and gently pat dry.    DO NOT wear tight clothing that rubs against  your incisions while they heal.   DO NOT soak in a bathtub, swimming pool, or hot tub until your incisions are healed completely, usually around 7-14 days. No tub baths or swimming until all incisions are healed.    CARE COMPANION  Track your fluid intake!  Report total fluids, not just water intake.  Reminders to set up follow up appointments.  Notifications of frequently asked questions.  Please use Startpack for any concerns regarding your health.    FOLLOW-UP CALL  You will receive a post-op phone call two to three days after surgery to check in and see how you are doing (If your surgery is on a Friday, your phone call will be on the Monday following your surgery).    You can always send us a message via Startpack if you have any questions or concerns.      CALL YOUR PROVIDER IF:      You have more redness, pain, warmth, swelling, or bleeding around your incision.     The wound is larger or deeper or looks dark or dried out.     The drainage from your incision does not decrease in 3 to 5 days or increases.     The drainage becomes thick, tan or yellow and has a bad smell (pus).     Your temperature is above 100 F (37.7 C) for more than 4 hours.     You have pain that your pain medicine is not helping.     You have chest and/or belly pain.     You have trouble breathing.     You have a cough that does not go away.     You cannot drink or eat.     You have a fast heartbeat.     You are dizzy or lightheaded.     You are not passing gas and have not had a bowel movement after following instructions above.     Your stools are loose, or you have diarrhea.     You are vomiting after eating.         Please let us know if you have any additional questions.    Sincerely,    Simona Paz RN, BSN  RN Care Coordinator  Bariatric Surgery and Comprehensive Weight Management  Phone: [phone]

## 2023-08-09 NOTE — PROGRESS NOTES
PREOP NURSE VISIT     This patient is having laparoscopic gastric sleeve with Dr. Yuan Higgins.    The following handouts were reviewed with the patient:  Before Your Surgery, Patient Checklist, Weight Loss Surgery Pre-operative Class, Preop Recommendations Quick Reference Guide, History and Physical, Medications to Avoid, Shower or Bathing Before Surgery, Bowel Preparation, Powerful Choices, and [place] [facility] Directive.  Questions were addressed and understanding of content was verbalized.  Contact information was provided.    WEIGHT CHECK:  Patient goal weight: 242    Weight today: 220    Surgery Date and Time: 8/29/23 at 8 AM   Call [phone] Pepe Conway to confirm surgery date.     PAC Visit: 8/21/23  Call [phone] to schedule your pre-operative history and physical with our PAC clinic.    PROBLEM LIST:  Patient Active Problem List   Diagnosis    Coronary artery disease involving native coronary artery of native heart without angina pectoris    Automatic implantable cardioverter-defibrillator in situ    Essential hypertension    Hyperlipidemia    Fibromyalgia    Osteoarthritis of both knees, unspecified osteoarthritis type    Osteoarthritis of one hip, left    Osteopenia of multiple sites    S/P TKR (total knee replacement) using cement, right    Stage 2 moderate COPD by GOLD classification (H)    Stiffness of joints of both hands    Vitamin D deficiency    Migraine without aura and without status migrainosus, not intractable    H/O: hysterectomy    Cataract, left    Arthritis of right acromioclavicular joint    Anxiety state    Allergic rhinitis    Aspirin long-term use    Displacement of lumbar intervertebral disc without myelopathy    Macrocytosis    Class 3 severe obesity with serious comorbidity and body mass index (BMI) of 45.0 to 49.9 in adult (H)    Pre-diabetes         MEDICAL CONDITIONS REVIEW:  Diabetic? No.   Diabetics who are on medications instructed to monitor blood sugar levels  closely after surgery and contact prescribing provider if levels run low as they may need their medications adjusted.    On high blood pressure medications? Yes   Patients on high blood pressure medications instructed to monitor blood pressure levels closely after surgery and contact prescribing provider if pressure are running low as they may need their medications adjusted.    CURRENT MEDICATIONS:   Current Outpatient Medications   Medication Sig Dispense Refill    acetaminophen (TYLENOL) 325 MG tablet Take 650 mg by mouth      albuterol (PROAIR HFA/PROVENTIL HFA/VENTOLIN HFA) 108 (90 Base) MCG/ACT inhaler Inhale 1-2 puffs into the lungs      aspirin (ASA) 81 MG chewable tablet 81 mg      benzonatate (TESSALON) 200 MG capsule Take 200 mg by mouth as needed      Budeson-Glycopyrrol-Formoterol (BREZTRI AEROSPHERE) 160-9-4.8 MCG/ACT AERO Inhale 2 puffs into the lungs 2 times daily      busPIRone (BUSPAR) 10 MG tablet Take 10 mg by mouth 3 times daily      butalbital-acetaminophen-caffeine (ESGIC) -40 MG tablet Take 1 tablet by mouth as needed      famotidine (PEPCID) 40 MG tablet Take 1 tablet by mouth At Bedtime      furosemide (LASIX) 20 MG tablet Take 20 mg by mouth daily      gabapentin (NEURONTIN) 300 MG capsule Take 900 mg by mouth 3 times daily      galcanezumab-gnlm (EMGALITY) 120 MG/ML injection Inject 1 auto injector pen every month, rotate injection site locations each month. For monthly maintenance dosing to prevent headaches/migraines. (Patient not taking: Reported on 8/7/2023)      JARDIANCE 10 MG TABS tablet TAKE 1 TABLET BY MOUTH ONCE DAILY - DO NOT CHEW OR CRUSH      magnesium 250 MG tablet Take 250 mg by mouth      metFORMIN (GLUCOPHAGE XR) 500 MG 24 hr tablet Take 500 mg by mouth      metoprolol succinate ER (TOPROL XL) 25 MG 24 hr tablet TAKE 1 & 1/2 (ONE & ONE-HALF) TABLETS BY MOUTH ONCE DAILY -  DO  NOT  CRUSH  OR  CHEW      nystatin (MYCOSTATIN) 680383 UNIT/GM external powder Apply 60 g  topically as needed      oxyCODONE (ROXICODONE) 5 MG tablet Take 5 mg by mouth daily as needed (Patient not taking: Reported on 6/1/2023)      pantoprazole (PROTONIX) 40 MG EC tablet Take 40 mg by mouth At Bedtime      potassium chloride ER (K-TAB/KLOR-CON) 10 MEQ CR tablet Take 10 mEq by mouth daily      prochlorperazine (COMPAZINE) 5 MG tablet Take 1-2 tablets by mouth at onset of migraine headache, limit 3 times per week.      riboflavin 100 MG CAPS Take 1 tablet by mouth 2 times daily      rosuvastatin (CRESTOR) 40 MG tablet Take 1 tablet by mouth At Bedtime      sotalol (BETAPACE) 80 MG tablet Take 80 mg by mouth 2 times daily      spironolactone (ALDACTONE) 25 MG tablet Take 1 tablet by mouth daily at 2 pm      topiramate (TOPAMAX) 100 MG tablet Take 100 mg by mouth At Bedtime      traZODone (DESYREL) 150 MG tablet Take 150 mg by mouth At Bedtime      venlafaxine (EFFEXOR) 50 MG tablet Take 50 mg by mouth daily         Patient currently taking opioid/narcotic pain medications? No.    CURRENT ALLERGIES:     Allergies   Allergen Reactions    Amitriptyline      Tongue swells up and gets little dots on tongue    Amoxicillin      Sore tongue (no problems with Amoxicillin)  ++ has tolerated cephalexin and cefazolin ++    Sulindac      Lip swelling  3-7-17: Has tolerated other NSAIDs       POSTOP MEDICATIONS:  The following postop medications were sent to the patient's pharmacy.  Patient was instructed to  medications before surgery and to have them at home for discharge.    HYSCYAMINE (LEVSIN) and SENNA (SENOKOT),  Patient currently on Protonix and Compazine.    LOGISITICS:  Patient lives greater than 3 hours from hospital?  Yes.  If patient lives greater than 3 hours away, is patient planning on staying in the area after surgery?  No.   Patients instructed to take breaks each hour on car ride home.  To be out of vehicle, stretch and walk for at least 10 minutes.  To do ankle pump exercises in  car.      SUPPORT SYSTEM  Mom and sister will be helping patient with postop care.    PAPERWORK  FMLA/Time OFF:  Patient does not work.    Bariatric Task List    Fax:  Please fax all paperwork to: [phone] -     Status:  Is patient a candidate for bariatric surgery?:  patient is a candidate for bariatric surgery -     Cleared to schedule surgeon consult?:  cleared to schedule surgeon consult -     Status:  surgery evaluation in process -     Surgeon: Dr. Higgins -     Tentative surgery month/year: 8/29/23 -        Insurance: Insurance:  [employer] -      Contact insurance to discuss coverage: Needed -       [employer]: PCP Recommendation and Medical Clearance:    -     HP Referral:    -      Advanced beneficiary notification (ABN) for Medicare patients for RD visits   and surgery:   -      Weight history:   -     Other:    -        Patient Info: Initial Weight:  252 -     Date of Initial Weight/Height:  12/5/2022 -     Goal Weight (lbs):  242 -     Required Weight Loss:  10 -     Surgery Type:  sleeve gastrectomy -     Multidisciplinary Meeting:    -        Dietician Visits: Structured weight loss required by insurance?:    -     Dietician Visit 1:  Completed - 12/6/22, Neyda Campos, Ronda Ferreira, [place]   Dietician Visit 2:  Completed - 1/17/23, Neyda Campos, Ronda Ferreira, [place]   Dietician Visit 3:  Completed - 3/22/2023 - [facility]   Dietician Visit 4:  Completed - 5/9/23. [facility]   Dietician Visit 5:  Needed - 6/29/23 apt. [facility]   Dietician Visit 6:    -     Dietician Visit additional:  Needed - Monthly until surgery - [place]   Clearance from dietician to see surgeon?:    -     Dietician Notes:    -        Psychological Evaluation: Psych eval:  Completed - List and letter sent to patient, 12/7/22, [place] Scheduled for Feb 23rd    Therapist letter of support:  Completed - Letter sent to pt 3/27/23 - [place];9/19/22 initial Assessment & Diagnostic Impression Dr Fara Benson, PsyD, LP- available if needed. [facility]   Psychiatrist  "letter of support:    -     Establish care with therapist:    -     Complete eating disorder evaluation:    -     Letter of clearance from therapist/eating disorder program:    -     Other:    -        Lab Work: Complete Blood Count:  Completed -     Comprehensive Metabolic Panel:  Completed -     Vitamin D:  Completed -     PTH:  Completed -     Hgb A1c:  Completed -      Lipids: Completed -      TSH ([facility], [facility], [place] MA): Completed - 2/1/23 bks     Ferritin:   -       Folate:   -       Testosterone, Total and Free:   -     Thiamine:   -     Vitamin A:   -     Vitamin B12:   -     Zinc:   -     C-peptide:   -     H. pylori:    -     MRSA (2 swabs, minimum 48 hours apart):   -     Nicotine Testing:    -     Recheck Vitamin D:   -     Other:    -        Consults/ Clearance Sleep Medicine:    -     Cardiac:  Completed - Letter sent to patient, 3/27/23 - [place]; 1/5/23 cleared by Yaritza Jarquin -via fax on 5/23/23   Pain:   - Possibly, to eval at next visit   Dental:    -     Endocrine:    -     Gastroenterology:    -     Vascular Medicine:    -     Hematology:  Completed - econlt per heme, Letter sent to patient, 12/7/22, [place],12/14/23 Econsult w Jacob Cogan, MD- advised 60mg Lovenox twice daily for 4 weeks post op - [facility]   Medical Weight Management:   -     Physical Therapy/Exercise:    -     Nephrology:    -     Neurology:  Completed - Letter sent to patient, 3/27/23 - [place]; 5/23/23 from  Cheko Fuentes DO. [facility]   Pulmonology:  Completed - Letter sent to patient, 3/27/23 - [place]; 4/27/23 Karla Roberto MD- cleared from a pulmonary perspective, \" She can transition to nebulizer treatments instead of inhaler treatments pre and post-operatively\". bks   Rheumatology:    -     Other:    -     Other:    -     Other:    -        Testing: UGI:    -     EGD:    -     Sleep Study:   -     Other:   -     Other:    -        PCP: Establish care with PCP:  Completed -     Follow up with PCP:    -     PCP letter of support:  " "Completed - Letter sent to patient, 3/27/23 - [place]; 2/1/23 PCP ltr via fax from Karla Roberto MD (Pepe has copy). bks      Stopping Smoking/ Alcohol Use: Quit tobacco use (3 months smoke free)?:    -     Quit date:    -     Quit alcohol use:   -     Quit date:   -     Other:   -     Quit date:   -        Patient Education:  Information Session:  Complete-     Given \"Making your decision\" handout?:  Yes -     Given \"A Roadmap to you Weight Loss Surgery\" handout?: Yes -     Given \"Get Well Loop\" information?: Yes -     Given support group information?:    -     Attended support group?:  Needed -     Support plan in place?:    -     Research consents signed?:    -     Avoid NSAIDS/ Alternate Plan for Pain:   -        Additional Surgery Requirements: Review Coag plan:    -     HgA1c <8:    -     Inpatient pain consult:    -     Final nicotine screen:    -     Dental work complete:    -     Birth control plan:    -     Gallstone prevention plan (Actigall for 6 months postop):   -     Other:   -     Other:   -        Final Tasks:  Before surgery online class:  Complete- 8/823     Before surgery online class website link:  [email or web address]   After surgery online class:  Complete- 8/823   After surgery online class website link:  [email or web address]   Nurse visit per clinic:  Complete- 8/823   History and Physical per clinic:   -     Final labs per clinic: Needed -     Chest xray per clinic:   -     Electrocardiogram (ECG) per clinic:   -     Other:   -        Notes: Change from Get Well Loop to the Weight Loss Surgery Epic Care Companion    Dear Shira Best,    Effective in February 2023, please register for the Weight Loss Surgery Care Companion Pathway through the [employer] mobile julienne or via web browser after you receive an invite.   Information from this care journey can help you be more successful before and after surgery, for up to one year after your surgical procedure. " Your Care Companion Pathway through [employer] can also help answer any questions you might have related to the surgery.    The Pathway has 3 Phases:  Phase 1 starts when you get your Tasklist after your initial consultation with us or when you decide to start preparing for weight loss surgery.  Phase 2 starts when your surgery is scheduled.  Phase 3 starts on the day of discharge from the hospital.    You will be started on Phase 2    A patient can only be connected to one Care Companion journey at any given time.   You can remove or re-enroll yourself from the Weight Loss Surgery Care Companion Pathway by contacting us at [phone].     Your Weight Loss Surgery Team  [facility]  Ph:[phone]    -

## 2023-08-18 RX ORDER — IPRATROPIUM BROMIDE AND ALBUTEROL SULFATE 2.5; .5 MG/3ML; MG/3ML
1 SOLUTION RESPIRATORY (INHALATION) EVERY 6 HOURS PRN
COMMUNITY

## 2023-08-18 RX ORDER — TRAZODONE HYDROCHLORIDE 100 MG/1
200 TABLET ORAL AT BEDTIME
COMMUNITY

## 2023-08-18 NOTE — PROGRESS NOTES
Preoperative Assessment Center Medication History Note  Medication history completed on August 18, 2023 by this writer prior to patient's PAC appointment. See Epic admission navigator for prior to admission medications. Operating room staff will still need to confirm medications and last dose information on day of surgery.     Medication history interview sources  Patient and CareEverywhere/SureScripts via phone    Changes made to PTA medication list  Added: duoneb,   Deleted: venlafaxine,   Changed: acetaminophen, albuterol, aspirin, benzonatate, metformin dose/sig, trazodone dose/sig, metoprolol dose/sig, sotalol dose/sig,     Allergies reviewed with patient and updates made in EHR: yes    -- No recent (within 30 days) course of antibiotics  -- No recent (within 30 days) course of systemic steroids  -- Reports not being on blood thinning medications except for aspirin 81 mg.    -- Declines being on any other prescription or over-the-counter medications    Prior to Admission medications    Medication Sig Last Dose Taking? Auth Provider Long Term End Date   acetaminophen (TYLENOL) 325 MG tablet Take 650 mg by mouth every 6 hours as needed for mild pain Taking Yes Reported, Patient     albuterol (PROAIR HFA/PROVENTIL HFA/VENTOLIN HFA) 108 (90 Base) MCG/ACT inhaler Inhale 1-2 puffs into the lungs every 4 hours as needed for shortness of breath Taking Yes Reported, Patient Yes    aspirin (ASA) 81 MG chewable tablet Take 81 mg by mouth daily Taking Yes Reported, Patient     benzonatate (TESSALON) 200 MG capsule Take 200 mg by mouth 3 times daily as needed for cough Taking Yes Reported, Patient     Budeson-Glycopyrrol-Formoterol (BREZTRI AEROSPHERE) 160-9-4.8 MCG/ACT AERO Inhale 2 puffs into the lungs 2 times daily Taking Yes Reported, Patient     busPIRone (BUSPAR) 10 MG tablet Take 10 mg by mouth 3 times daily Taking Yes Reported, Patient Yes    butalbital-acetaminophen-caffeine (ESGIC) -40 MG tablet Take 1  "tablet by mouth as needed Taking Yes Reported, Patient     famotidine (PEPCID) 40 MG tablet Take 1 tablet by mouth At Bedtime Taking Yes Reported, Patient     furosemide (LASIX) 20 MG tablet Take 20 mg by mouth daily Taking Yes Reported, Patient Yes    gabapentin (NEURONTIN) 300 MG capsule Take 900 mg by mouth 3 times daily Taking Yes Reported, Patient Yes    JARDIANCE 10 MG TABS tablet TAKE 1 TABLET BY MOUTH ONCE DAILY - DO NOT CHEW OR CRUSH Taking Yes Reported, Patient     metFORMIN (GLUCOPHAGE XR) 500 MG 24 hr tablet Take 500 mg by mouth daily (with breakfast) Taking Yes Reported, Patient Yes    metoprolol succinate ER (TOPROL XL) 25 MG 24 hr tablet Take 25 mg by mouth daily Original Rx for 37.5 mg (1.5 tablet) once daily, however pt has only been doing 1 tablet daily. Per pt MD aware. Taking Yes Reported, Patient Yes    nystatin (MYCOSTATIN) 136099 UNIT/GM external powder Apply 60 g topically as needed Taking Yes Reported, Patient     oxyCODONE (ROXICODONE) 5 MG tablet Take 5 mg by mouth daily as needed Taking Yes Reported, Patient     pantoprazole (PROTONIX) 40 MG EC tablet Take 40 mg by mouth At Bedtime Taking Yes Reported, Patient     potassium chloride ER (K-TAB/KLOR-CON) 10 MEQ CR tablet Take 10 mEq by mouth daily Taking Yes Reported, Patient     prochlorperazine (COMPAZINE) 5 MG tablet Take 1-2 tablets by mouth at onset of migraine headache, limit 3 times per week. Taking Yes Reported, Patient     rosuvastatin (CRESTOR) 40 MG tablet Take 1 tablet by mouth At Bedtime Taking Yes Reported, Patient Yes    senna-docusate (SENOKOT-S/PERICOLACE) 8.6-50 MG tablet Take 2 tablets by mouth daily as needed for constipation (While taking narcotic pain medications.  Stop taking if having loose stools.) Taking Yes Gerard Higgins MD     sotalol (BETAPACE) 80 MG tablet Take 80 mg by mouth 2 times daily Original Rx was for 1.5 tablet BID but was \"too much\" and dropped dose back to 1 tablet twice daily (per pt MD " aware). Taking Yes Reported, Patient     spironolactone (ALDACTONE) 25 MG tablet Take 1 tablet by mouth daily Taking Yes Reported, Patient Yes    topiramate (TOPAMAX) 100 MG tablet Take 100 mg by mouth At Bedtime Taking Yes Reported, Patient Yes    traZODone (DESYREL) 100 MG tablet Take 200 mg by mouth At Bedtime Taking Yes Unknown, Entered By History Yes    galcanezumab-gnlm (EMGALITY) 120 MG/ML injection Inject 1 auto injector pen every month, rotate injection site locations each month. For monthly maintenance dosing to prevent headaches/migraines.  Patient not taking: Reported on 8/7/2023 Not Taking  Reported, Patient     hyoscyamine (LEVSIN) 0.125 MG tablet Take 1 tablet (125 mcg) by mouth every 4 hours as needed for cramping  Patient not taking: Reported on 8/18/2023 Not Taking  Gerard Higgins MD     ipratropium - albuterol 0.5 mg/2.5 mg/3 mL (DUONEB) 0.5-2.5 (3) MG/3ML neb solution Take 1 vial by nebulization every 6 hours as needed for shortness of breath, wheezing or cough  Patient not taking: Reported on 8/18/2023 Not Taking  Unknown, Entered By History Yes    magnesium 250 MG tablet Take 250 mg by mouth  Patient not taking: Reported on 8/18/2023 Not Taking  Reported, Patient     riboflavin 100 MG CAPS Take 1 tablet by mouth 2 times daily  Patient not taking: Reported on 8/18/2023 Not Taking  Reported, Patient          Medication History Completed By: Wilian Thompson RPH 8/18/2023 11:59 AM

## 2023-08-21 ENCOUNTER — ANESTHESIA EVENT (OUTPATIENT)
Dept: SURGERY | Facility: CLINIC | Age: 58
DRG: 620 | End: 2023-08-21
Payer: COMMERCIAL

## 2023-08-21 ENCOUNTER — VIRTUAL VISIT (OUTPATIENT)
Dept: SURGERY | Facility: CLINIC | Age: 58
End: 2023-08-21
Payer: COMMERCIAL

## 2023-08-21 ENCOUNTER — PRE VISIT (OUTPATIENT)
Dept: SURGERY | Facility: CLINIC | Age: 58
End: 2023-08-21

## 2023-08-21 DIAGNOSIS — Z01.818 PRE-OP EVALUATION: Primary | ICD-10-CM

## 2023-08-21 DIAGNOSIS — E66.01 CLASS 3 SEVERE OBESITY WITH SERIOUS COMORBIDITY AND BODY MASS INDEX (BMI) OF 45.0 TO 49.9 IN ADULT, UNSPECIFIED OBESITY TYPE (H): ICD-10-CM

## 2023-08-21 DIAGNOSIS — E66.813 CLASS 3 SEVERE OBESITY WITH SERIOUS COMORBIDITY AND BODY MASS INDEX (BMI) OF 45.0 TO 49.9 IN ADULT, UNSPECIFIED OBESITY TYPE (H): ICD-10-CM

## 2023-08-21 PROCEDURE — 99204 OFFICE O/P NEW MOD 45 MIN: CPT | Mod: VID | Performed by: NURSE PRACTITIONER

## 2023-08-21 ASSESSMENT — PAIN SCALES - GENERAL: PAINLEVEL: MODERATE PAIN (4)

## 2023-08-21 ASSESSMENT — COPD QUESTIONNAIRES
CAT_SEVERITY: MILD
COPD: 1

## 2023-08-21 NOTE — PATIENT INSTRUCTIONS
Preparing for Your Surgery      Name:  Shira Best   MRN:  [id number]   :  1965   Today's Date:  2023       Arriving for surgery:  Surgery date:  23  Arrival time:  06:00 am      Please come to:   :      [facility] [address]  [address] [place]  [address]      The [facility] Patient /Visitor Ramp is located at [address]. Patients and visitors who self-park will receive the reduced hospital parking rate. If the Patient /Visitor Ramp is full, please follow the signs to the [employer] parking located at the main hospital entrance.     parking is available ( 24 hours/ 7 days a week)    Discounted parking pass options are available for patients and visitors. They can be purchased at the [employer] desk at the main hospital entrance.    -    Stop at the security desk and they will direct surgery patients to the 3rd floor Surgery Waiting Room. [phone]     -  If you are in need of directions, wheelchair or escort please stop at the Information/security desk in the lobby.       What can I eat or drink?  -  You may eat per surgeon's instructions.  -  You may have clear liquids until 2 hours prior to arrival time.     Examples of clear liquids:  Water  Clear broth  Juices (apple, white grape, white cranberry  and cider) without pulp  Noncarbonated, powder based beverages  (lemonade and Norris-Aid)  Sodas (Sprite, 7-Up, ginger ale and seltzer)  Coffee or tea (without milk or cream)  Gatorade    -  No Alcohol or cannabis products for at least 24 hours before surgery.     Which medicines can I take?    Hold Aspirin for 7 days before surgery.   Hold Multivitamins for 7 days before surgery.  Hold Supplements for 7 days before surgery.  Hold Ibuprofen (Advil, Motrin) for 1 day(s) before surgery--unless otherwise directed by surgeon.  Hold Naproxen (Aleve) for 4 days before surgery.    Hold jardiance x 3 days  before surgery.    -  DO NOT take these medications the day of surgery:  Metformin + aldactone + lasix + potassium.    -  PLEASE TAKE these medications the day of surgery:  Oxycodone or tylenol if needed; take other morning medications.  Bring inhalers if currently using.    How do I prepare myself?  - Please take 2 showers (one the night prior to surgery and one the morning of surgery) using Scrubcare or Hibiclens soap.    Use this soap only from the neck to your toes.     Leave the soap on your skin for one minute--then rinse thoroughly.      You may use your own shampoo and conditioner. No other hair products.   - Please remove all jewelry and body piercings.  - No lotions, deodorants or fragrance.  - No makeup or fingernail polish.   - Bring your ID and insurance card.    -If you have a Deep Brain Stimulator, Spinal Cord Stimulator, or any Neuro Stimulator device---you must bring the remote control to the hospital.      ALL PATIENTS GOING HOME THE SAME DAY OF SURGERY ARE REQUIRED TO HAVE A RESPONSIBLE ADULT TO DRIVE AND BE IN ATTENDANCE WITH THEM FOR 24 HOURS FOLLOWING SURGERY.    Covid testing policy as of 12/06/2022  Your surgeon will notify and schedule you for a COVID test if one is needed before surgery--please direct any questions or COVID symptoms to your surgeon      Questions or Concerns:    - For any questions regarding the day of surgery or your hospital stay, please contact the Pre Admission Nursing Office at [phone].       - If you have health changes between today and your surgery, please call your surgeon.       - For questions after surgery, please call your surgeons office.           Current Visitor Guidelines    You may have 2 visitors in the pre op area.    Visiting hours: 8 a.m. to 8:30 p.m.    You may have four visitors during your inpatient hospital stay.    Patients confirmed or suspected to have symptoms of COVID 19 or flu:     No visitors allowed for adult patients.   Children  (under age 18) can have 1 named visitor.     People who are sick or showing symptoms of COVID 19 or flu:    Are not allowed to visit patients--we can only make exceptions in special situations.       Please follow these guidelines for your visit:          Please maintain social distance          Masking is optional--however at times you may be asked to wear a mask for the safety of yourself and others     Clean your hands with alcohol hand . Do this when you arrive at and leave the building and patient room,    And again after you touch your mask or anything in the room.     Go directly to and from the room you are visiting.     Stay in the patient s room during your visit. Limit going to other places in the hospital as much as possible     Leave bags and jackets at home or in the car.     For everyone s health, please don t come and go during your visit. That includes for smoking   during your visit.

## 2023-08-21 NOTE — H&P
"  Pre-Operative H & P     CC:  Preoperative exam to assess for increased cardiopulmonary risk while undergoing surgery and anesthesia.    Date of Encounter: 8/21/2023  Primary Care Physician:  Karla Roberto     Reason for visit:   Encounter Diagnoses   Name Primary?    Pre-op evaluation Yes    Class 3 severe obesity with serious comorbidity and body mass index (BMI) of 45.0 to 49.9 in adult, unspecified obesity type (H)        HPI  Shira Best is a 58 year old female who presents for pre-operative H & P in preparation for  Procedure Information       Case: [id number] Date/Time: 08/29/23 0800    Procedures:       GASTRECTOMY, SLEEVE, LAPAROSCOPIC (Abdomen)      HERNIORRHAPHY, HIATAL, LAPAROSCOPIC (Abdomen)    Anesthesia type: General    Diagnosis: Morbid obesity (H) [E66.01]    Pre-op diagnosis: Morbid obesity (H) [E66.01]    Location:  OR  /  OR    Providers: Gerard Higgins MD          The patient presents to the PAC VIRTUALLY today in preparation for the above scheduled procedure with comorbid conditions including CAD s/p NSTEMI, Vfib arrest s/p ICD, HFpEF, pre-diabetes, HTN, HLD, COPD, obesity, h/o blood transfusion, chronic pain with FM/migraines/ OA, GERD, CKD and PONV.       The patient was seen by Dr. Higgins for evaluation of possible weight loss surgery.  At initial evaluation, the patient's Height: 157.5 cm (5' 2\"), and BMI 46.2 kg/m2.  Through the evaluation process, the patient was deemed an acceptable candidate.  The patient was counseled by the surgical team.  She has now been scheduled for the procedure as listed above.      History is obtained from the patient and chart review    Hx of abnormal bleeding or anti-platelet use: ASA     Menstrual history: No LMP recorded. Patient has had a hysterectomy.:      Past Medical History  No past medical history on file.    Past Surgical History  Past Surgical History:   Procedure Laterality Date    ESOPHAGOSCOPY, GASTROSCOPY, DUODENOSCOPY " (EGD), COMBINED N/A 4/12/2023    Procedure: ESOPHAGOGASTRODUODENOSCOPY, WITH BIOPSY;  Surgeon: Gerard Higgins MD;  Location:  GI       Prior to Admission Medications  Current Outpatient Medications   Medication Sig Dispense Refill    acetaminophen (TYLENOL) 325 MG tablet Take 650 mg by mouth every 6 hours as needed for mild pain      albuterol (PROAIR HFA/PROVENTIL HFA/VENTOLIN HFA) 108 (90 Base) MCG/ACT inhaler Inhale 1-2 puffs into the lungs every 4 hours as needed for shortness of breath      aspirin (ASA) 81 MG chewable tablet Take 81 mg by mouth daily      benzonatate (TESSALON) 200 MG capsule Take 200 mg by mouth 3 times daily as needed for cough      Budeson-Glycopyrrol-Formoterol (BREZTRI AEROSPHERE) 160-9-4.8 MCG/ACT AERO Inhale 2 puffs into the lungs 2 times daily      busPIRone (BUSPAR) 10 MG tablet Take 10 mg by mouth 3 times daily      butalbital-acetaminophen-caffeine (ESGIC) -40 MG tablet Take 1 tablet by mouth as needed      famotidine (PEPCID) 40 MG tablet Take 1 tablet by mouth At Bedtime      furosemide (LASIX) 20 MG tablet Take 20 mg by mouth daily      gabapentin (NEURONTIN) 300 MG capsule Take 900 mg by mouth 3 times daily      JARDIANCE 10 MG TABS tablet TAKE 1 TABLET BY MOUTH ONCE DAILY - DO NOT CHEW OR CRUSH      metFORMIN (GLUCOPHAGE XR) 500 MG 24 hr tablet Take 500 mg by mouth daily (with breakfast)      metoprolol succinate ER (TOPROL XL) 25 MG 24 hr tablet Take 25 mg by mouth daily Original Rx for 37.5 mg (1.5 tablet) once daily, however pt has only been doing 1 tablet daily. Per pt MD aware.      nystatin (MYCOSTATIN) 591374 UNIT/GM external powder Apply 60 g topically as needed      oxyCODONE (ROXICODONE) 5 MG tablet Take 5 mg by mouth daily as needed      pantoprazole (PROTONIX) 40 MG EC tablet Take 40 mg by mouth At Bedtime      potassium chloride ER (K-TAB/KLOR-CON) 10 MEQ CR tablet Take 10 mEq by mouth daily      prochlorperazine (COMPAZINE) 5 MG tablet Take 1-2  "tablets by mouth at onset of migraine headache, limit 3 times per week.      rosuvastatin (CRESTOR) 40 MG tablet Take 1 tablet by mouth At Bedtime      senna-docusate (SENOKOT-S/PERICOLACE) 8.6-50 MG tablet Take 2 tablets by mouth daily as needed for constipation (While taking narcotic pain medications.  Stop taking if having loose stools.) 30 tablet 1    sotalol (BETAPACE) 80 MG tablet Take 80 mg by mouth 2 times daily Original Rx was for 1.5 tablet BID but was \"too much\" and dropped dose back to 1 tablet twice daily (per pt MD aware).      spironolactone (ALDACTONE) 25 MG tablet Take 1 tablet by mouth daily      topiramate (TOPAMAX) 100 MG tablet Take 100 mg by mouth At Bedtime      traZODone (DESYREL) 100 MG tablet Take 200 mg by mouth At Bedtime      galcanezumab-gnlm (EMGALITY) 120 MG/ML injection Inject 1 auto injector pen every month, rotate injection site locations each month. For monthly maintenance dosing to prevent headaches/migraines. (Patient not taking: Reported on 8/7/2023)      hyoscyamine (LEVSIN) 0.125 MG tablet Take 1 tablet (125 mcg) by mouth every 4 hours as needed for cramping (Patient not taking: Reported on 8/18/2023) 30 tablet 1    ipratropium - albuterol 0.5 mg/2.5 mg/3 mL (DUONEB) 0.5-2.5 (3) MG/3ML neb solution Take 1 vial by nebulization every 6 hours as needed for shortness of breath, wheezing or cough (Patient not taking: Reported on 8/18/2023)      magnesium 250 MG tablet Take 250 mg by mouth (Patient not taking: Reported on 8/18/2023)      riboflavin 100 MG CAPS Take 1 tablet by mouth 2 times daily (Patient not taking: Reported on 8/18/2023)         Allergies  Allergies   Allergen Reactions    Amitriptyline      Tongue swells up and gets little dots on tongue    Amoxicillin      Sore tongue (no problems with Amoxicillin)  ++ has tolerated cephalexin and cefazolin ++    Sulindac      Lip swelling  3-7-17: Has tolerated other NSAIDs       Social History  Social History "     Socioeconomic History    Marital status: Single     Spouse name: Not on file    Number of children: Not on file    Years of education: Not on file    Highest education level: Not on file   Occupational History    Not on file   Tobacco Use    Smoking status: Former     Types: Cigarettes    Smokeless tobacco: Never   Vaping Use    Vaping Use: Never used   Substance and Sexual Activity    Alcohol use: Yes     Comment: Occasional    Drug use: Not Currently    Sexual activity: Not on file   Other Topics Concern    Not on file   Social History Narrative    Not on file     Social Determinants of Health     Financial Resource Strain: Not on file   Food Insecurity: Not on file   Transportation Needs: Not on file   Physical Activity: Not on file   Stress: Not on file   Social Connections: Not on file   Intimate Partner Violence: Not on file   Housing Stability: Not on file       Family History  No family history on file.    Review of Systems  The complete review of systems is negative other than noted in the HPI or here.   Anesthesia Evaluation   Pt has had prior anesthetic. Type: General and MAC.    History of anesthetic complications  -  and PONV.  h/o migraines after anesthesia.    ROS/MED HX  ENT/Pulmonary:     (+)     CHILANGO risk factors (Reports she had a sleep study a couple years ago and was told she did not have CHILANGO.), snores loudly, hypertension, obese,                mild,  COPD (Reports that she uses her inhalers as prescribed.  Uses rescue inhaler about once a week.  Reports OVIEDO, Cough and phlegm are at her baseline.),              Neurologic: Comment: Fibromyalgia treated with gabapentin.     (+)    no peripheral neuropathy  migraines (Reports good control over the past year since starting Emgality.),                       (-) no CVA   Cardiovascular: Comment: -  Denies cardiac symptoms.      (+)  hypertension- -  CAD - past MI (NSTEMI>>Takutsobu variant) - -   Taking blood thinners        OVIEDO (No change in  baseline symptoms.).   pacemaker, Reason placed: Vent Fib. type: [employer], settings: DDDR,  ICD Reason placed:VF.  type;[employer] Settings DDDR           Previous cardiac testing     METS/Exercise Tolerance: >4 METS Comment: Rides bike ~ 30 minutes per day.     Hematologic:     (+) History of blood clots (PE in 2015.  Completed 6 months of warfarin),    pt is not anticoagulated,  history of blood transfusion, no previous transfusion reaction, Known PRBC Anitbodies:No - h/o blood transfusion noted in chart but patient  does not recall this.,      Musculoskeletal: Comment: Right AC joint surgery   (+)  arthritis (s/p b/l TKA and left hip),             GI/Hepatic:     (+) GERD, Asymptomatic on medication,                  Renal/Genitourinary:  - neg Renal ROS     Endo:     (+)               Obesity,    (-) Type II DM   Psychiatric/Substance Use:     (+) psychiatric history (Reports to be stable) anxiety    (-) alcohol abuse history and chronic opioid use history   Infectious Disease:  - neg infectious disease ROS     Malignancy:  - neg malignancy ROS     Other:  - neg other ROS    (+)  , H/O Chronic Pain,         Virtual visit -  No vitals were obtained    Physical Exam  Constitutional: Awake, alert, cooperative, no apparent distress, and appears stated age.  Eyes: Pupils equal  HENT: Normocephalic  Respiratory: non labored breathing   Neurologic: Awake, alert, oriented to name, place and time.   Neuropsychiatric: Calm, cooperative. Normal affect.      Prior Labs/Diagnostic Studies   All labs and imaging personally reviewed   8/11/2023        Sodium 134 - 143 mEq/L 142    Potassium 3.4 - 5.1 mEq/L 3.3 Low     Chloride 99 - 110 mEq/L 112 High     Carbon Dioxide 19 - 29 mEq/L 21    Anion Gap 3.0 - 15.0 mEq/L 9.0    Blood Urea Nitrogen 5 - 24 mg/dL 11    Creatinine 0.40 - 1.00 mg/dL 1.24 High     Glomerular Filtration Rate >60 mL/min/1.73 m*2 50 Low  Risk of cardiovascular disease increases when GFR is  abnormal; persistently reduced GFR values are a specific indication of CKD. This calculation uses CKD-EPI 2021 equation without adjustment for race; it has not been validated in pregnant women.   Calcium 8.4 - 10.5 mg/dL 9.2    Glucose 70 - 99 mg/dL 91    Resulting Agency  [facility] LABORATORY      7/3/2023  WBC 3.2 - 11.0 10*9/L 5.6   RBC 3.77 - 5.24 10*12/L 3.74 Low    HGB 11.2 - 15.5 g/dL 12.9   HCT 34.3 - 46.0 % 37.8   MCV 81.4 - 99.0 fL 101.1 High    MCH 26.7 - 33.1 pg 34.5 High    MCHC 31.6 - 35.5 g/dL 34.1   RDW 11.3 - 14.6 % 17.2 High     - 375 10*9/L 139   Resulting [facility]     Specimen Collected: 07/03/23 11:33 AM     Component 03/01/23 07/19/22 09/09/20 03/15/19 03/07/18 03/08/17   Hemoglobin A1c 6.0 High  5.8 High  5.2 5.4 4.9 5.1   Estimated Average Glucose 126 120 103 108 94 100     PROCEDURES  CARDIAC DEVICE REMOTE SUMMARY   07/06/23 1000  Remote Impression and Plan  Place of Service Home;ICD  Final Impression Normal Remote Monitor, no events.  RVP > 40% NO  EF 60  EF Tests Echo  EF Test DOS 01/31/22  Events/Comments No episodes.  Follow Up 1 year office visit with every 3 month remotes  Plan of Care Full report under Media/CV tab  Anticoagulation No  Battery 3 yrs  Atrial Fib [place] % 0%  AP % 54  RVP % 0  Heart Rate Histograms Adequate histograms  Non-sustained 0  Presenting Rhythm APVS  Presenting Heart Rate 61  Indication VF  Device Synopsis Most recent data compared.;EGM rhythm strips reviewed for dysrhythmias.;Ectopic beats and mode switching assessed.;Results available for patient per a Televox call notification and can be viewed in [employer].      Electronically signed by Sukhdev Stewart MBBS at 07/06/2023 7:55 PM CDT     CARDIAC DEVICE CLINIC SUMMARY 4/6/2023 04/06/23 1351  Place of Service  Place of Service Outreach;ICD  ([place])  Final Impression  Final Impression Normal Office Evaluation, no  events.;Functioning Appropriately  RVP > 40% NO  EF 60  EF Tests Echo  EF Test DOS 01/31/22  Assessment  Patient Reports Fatigue;Dizziness  (Dizziness last week after getting up from bed.)  Patient Denies SOB;CP;Syncope;Palpitations;Shocks  Assessment Pt reports activity level unchanged  (has increased)  Anticoagulation No  Wound Assessment Left Chest, incision dry and intact  Sohan Pacing  Presenting Mode: Sohan DDDR  Lower Rate 60 BPM  Upper Rate 130 BPM  Presenting Tachy  Monitor Only Rate 150  VT-2 180    Non sustained episodes 0  Diagnostic/Counters Review  Atrial % Pacing since last reset 35  Ventricular % Pacing since last reset  (<1%)  Atrial Fib [place] % 0%  Heart Rate Histograms Good curve on histograms  Non-sustained 0  Heart Rhythm/Intrinsic Rhythm  Intrinsic Rhythm Normal Sinus Rhythm  Intrinsic Heart Rate 64  Presenting Rhythm ASVS  Presenting Heart Rate 64  Dependent < 40 No  Atrial Testing  P wave 1.8 MV  Atrial Capture (V@ms) 0.8V @ 0.4ms  Atrial Impedence (Ohms) 424 Ohms  Ventricular Testing  R Wave (RVMV) 8.7  R V Capture (V@ms) 1.0V @ 0.4ms  R V Impedance 408 Ohms  Shock Impedance 62 Ohms  Battery 3.5 years  Charge Time 11.1 sec.  Reprogramming  Reprogramming Summary Normal device function no changes made  Plan of Care/Follow Up  Plan of Care The above information was reviewed with the pt;Full report under Media/CV tab  Follow Up 1 year office visit with every 3 month remotes  ICD/Pacemaker Details  Device Type ICD  Evaluation Type Scheduled Office  ICD/Pacemaker  [employer]  Model Incepta E162  Serial # [id number]  Implant Date 03/09/15  Indication VF  Follows with Remote Monitor Yes  Implanting Physician  (Dr. Huffman)  Device Synopsis  Device Synopsis Most recent data compared.;Alerts reviewed.;EGM rhythm strips reviewed for dysrhythmias.;Ectopic beats and mode switching assessed.;Results available for patient per a Televox call notification and can be viewed in  [facility].;Results reviewed with patient.      Electronically signed by Artemio Cast DO at 04/06/2023 7:40 PM CDT       TTE 1/31/2022    Interpretation Summary   The left ventricle is normal size. Concentric remodeling of the left ventricle.   The left ventricular systolic function is normal. Wall motion is normal.   Qualitative ejection fraction is 60-65% (normal).   Catheter/pacemaker lead noted within the right ventricle.   Left ventricular diastolic function is normal.   Mild tricuspid regurgitation.   Calculated RV systolic pressure was estimated at 33 mm Hg.   Inferior vena cava size normal and collapsibility > 50% normal indicating normal right atrial pressure (3 mm Hg).   There is no pericardial effusion.     Compared to prior study report dated 10/27/2021, the left ventricle is better visualized with contrast. LV function is normal.     PULMONARY FUNCTION 1/25/2022    SITE:  [facility] PULMONARY FUNCTION LAB   ORDERED BY:  SHAYAN JONES       CLINICAL SUMMARY: Subject is a 56-year-old white female with a clinical diagnosis of COPD and dyspnea on exertion.     STUDY: Spirometry before and after bronchodilators, lung volumes, and diffusing capacity were performed.     FINDINGS: Adequate effort was obtained as noted on the flow volume loop. Spirometry suggests a moderate obstructive ventilatory defect. There is a significant improvement in the FVC after acute bronchodilator challenge. Lung volumes show a normal total lung capacity. The residual volume is increased suggesting air trapping. The diffusing capacity uncorrected for hemoglobin is normal at 85% of predicted.     Shayan Jones MD    Pulmonary Medicine [phone]   [address] [address] MPI 2019    Interpretation Summary   Myocardial perfusion imaging is normal.   No evidence of inducible myocardial ischemia or myocardial infarction.   Normal  hemodynamic response to stress.   Average exercise tolerance (exercised 6:45 min Cameron protocol, stage III, 7.5 METS) and stopped due to fatigue. Duke treadmill score +6.8 (low risk).   Achieved submaximal workload so test was converted to Lexiscan.   Overall left ventricular systolic function was normal without regional wall motion abnormalities.   Symptoms of shortness of breath were noted during stress and resolved with rest.   Resting EKG with normal sinus rhythm and with stress there was no significant ST-T segment changes.   Compared to prior 11/14/16, no significant change.       SLEEP STUDY 2016  CONCLUSIONS:   1. Normal respiratory pattern without evidence of sleep apnea (AHI of 1/h, SpO2 wilmar of 91%).   2. No excessive limb movements.   3. Normal sleep architecture.     RECOMMENDATIONS:   1. Patient presently does not qualify for CPAP.   2. If sleep problems persist such as insomnia, patient should follow up with sleep medicine service.         Tabitha Mims MD   [facility]   Sleep Medicine     CORONARY ANGIOGRAM 9/2015    CONCLUSIONS   1. Elevated LVEDP   2.  Normal LVEF with RWMA as noted above   3.  Severe lesion large diagonal 1 with successful PCI with a 2.5 x 26 mm Resolute REUBEN   4.  Possible occluded distal OM; no change from prior angiogram     RECOMMENDATIONS:   Dual antiplatelet therapy for 12 months   Medical therapy Optimization   Risk factor modification         LHC/CA/LV  3/7/2015    CONCLUSIONS   Normal coronary arteries with normal LV function       COMPLICATIONS   No Complications     VALVE FINDINGS   Aortic valve Normal   Mitral valve Normal       HEMODYNAMICS:           BSA:  1.99      HR:  77BPM   AO: 99/66  81   LV: 109/12  18     LV FINDINGS   LV Gram -  % EF   LV Wall Motion: Normal   Normal systolic function   Normal EDP pre and post Left Ventriculogram  mmHg       CARDIAC FINDINGS:   DOMINANCE:   Right Dominant   LEFT MAIN:   is angiographically  normal (0% Stenosis)   LEFT ANTERIOR DESCENDING :   and its branches are angiographically normal (0% Stenosis)   CIRCUMFLEX:   and its branches are angiographically normal (0% Stenosis)   RIGHT CORONARY ARTERY:   and its branches are angiographically normal (0% Stenosis)   EF 60 no regional wall motion abnormalities   this is a normal adult cath  aorta looks to be   normal   COLLATERALS:   No collateral flow     The patient's records and results personally reviewed by this provider.     Outside records reviewed from: Care Everywhere      Assessment    Shria Best is a 58 year old female seen as a PAC referral for risk assessment and optimization for anesthesia.    Plan/Recommendations  Pt will be optimized for the proposed procedure.  See below for details on the assessment, risk, and preoperative recommendations    NEUROLOGY  - No history of TIA, CVA or seizure  - Chronic Pain due fibromyalgia, migraines and osteoarthritis   ~ managed with gabapentin, flexeril, Emgality and occasional oxycodone.    ~  morphine equivilant = 7.5 MME/Day     -Post Op delirium risk factors:  High co-morbid index    ENT  - Physical exam is concerning for complicated airway.Appears to have a thick neck on video.  PER RECORD REVIEW FROM CARE EVERYWHERE:  ETT 11/28/22; 0741; Mask; Ventilated by mask with oral airway (2); Video laryngoscopy (CMAC); Cuffed; 7; Mac (CMAC); D; Oral; 1; 1; Auscultation, Capnometry, Glide Scope; 22 cm; CRNA     Mallampati: Unable to assess  TM: Unable to assess    CARDIAC  - CAD s/p NSTEMI Takutsobu variant s/p PCI and REUBEN of D1(2015)   ~ denies cardiac symptoms   ~ see cardiac testing above    ~ ASA and statin    - Vfib arrest s/p ICD (2015)   ~ [employer] DDDR     ~ Sotalol   ~ device check above    - HFpEF with h/o stress induced cardiomyopathy on echo from 2013   ~ medically management with metoprolol, spironolactone, furosemide, potassium replacement and more recently GLP1-RA    ~  Recovery of LVEF 60-65%    - HTN   ~ stable with metoprolol, spironolactone and  lasix    Follows with Dr. Yaritza Cid at [facility].  Cardiac pre op evaluation on 1/5/2023:  56 year old female with hx of CAD s/p NSTEMI, HFpEF, pre-diabetes, Vfib arrest s/p ICD and on Sotalol, HTN, HLD, obesity presenting for preop CV risk assessment prior to possible weight loss surgery.   She is stable, without active CV symptoms. Normal functional capacity. ICD interrogations showing no events.     - no additional work up necessary prior to bariatric surgery surgery. The patient is cleared to proceed with Bariatric surgery from a cardiac standpoint.   - continue aspirin 81mg, rosuvastatin 40mg (LDL at goal).   - continue sotalol 120mg BID. No events on ICD interrogation.   - reduce furosemide to 20mg daily. Stop potassium. Start spironolactone 12.5mg daily for HFpEF. Start Jardiance 10mg for HFpEF. She will stop Jardiance 2 days prior to any NPO/surgery or sick days.   - BMP to be collected in 7 days. We will schedule.     Will have this note faxed to Dr Gal Prescott. [facility].   [phone]    RTC in 6 months.     Yaritza Cid MD   [facility]      - METS (Metabolic Equivalents)  Patient performs 4 or more METS exercise without symptoms            Total Score: 0      - RCRI-Moderate risk: Class 3  6.6% complication rate            Total Score: 2    RCRI: CAD    RCRI: CHF        PULMONARY  - CHILANGO High Risk            Total Score: 5    CHILANGO: Snores loudly    CHILANGO: Hypertension    CHILANGO: BMI over 35 kg/m2    CHILANGO: Over 50 ys old    CHILANGO: Neck Circum >16 in      - COPD, moderate   ~ reports OVIEDO, phlegm and cough do be a baseline - no change   ~ Well controlled on current inhalers   ~ Continue maintenance inhaler and rescue inhaler as prescribe without interruptions. Bring inhalers with DOS.   ~ PFTs above     ~ Follows with Dr. Acosta  "Weerts       - Tobacco History    History   Smoking Status    Former    Types: Cigarettes   Smokeless Tobacco    Never       GI  - GERD  Controlled on medications: Proton Pump Inhibitor and H2B    - PONV. Significant history. Final decisions regarding prophylaxis by Anesthesia on DOS    PONV High Risk  Total Score: 4           1 AN PONV: Pt is Female    1 AN PONV: Patient is not a current smoker    1 AN PONV: Patient has history of PONV    1 AN PONV: Intended Post Op Opioids        /RENAL  - Hypokalemia on recent labs with primary team in [place].     ~ scheduled for labs tomorrow    - CKD  ~ Creatinine/GFR see above   ~ Monitoring renal function during periop period.   ~ Avoidance of nephrotoxins as possible      ENDOCRINE    - BMI: Estimated body mass index is 39.92 kg/m  as calculated from the following:    Height as of 8/8/23: 1.581 m (5' 2.24\").    Weight as of 8/8/23: 99.8 kg (220 lb).  Class 3 Obesity (BMI > 40)  Following in weight loss clinic with above procedure scheduled   ~ On Jardiance, Metformin and Topiramate   ~ ordered preoperative labs for DOS as patient is from out of town    - Prediabetic   Hemoglobin A1C   Date Value Ref Range Status   12/05/2022 6.0 (H) <5.7 % Final     Comment:     Normal <5.7%   Prediabetes 5.7-6.4%    Diabetes 6.5% or higher     Note: Adopted from ADA consensus guidelines.         HEME  - h/o Pulmonary emboli in 2015>>completed 6 months of anticoagulation   VTE Low Risk 0.5%            Total Score: 4    VTE: BMI greater than 39    VTE: Pt history of VTE      - Platelet dysfunction second to Aspirin (Mila, many others)   ~ hold for 7 days prior to surgery    - Records indicate a h/p blood transfusion but patient does not recall   ~ will get a CBC and T&S DOS     MSK  - Osteoarthritis s/p b/l TKA, left CHEYANNE and right AC joint repair.      PSYCH  - Anxiety   ~ Buspar    Different anesthesia methods/types have been discussed with the patient, but they are aware that the final " plan will be decided by the assigned anesthesia provider on the date of service.    Patient was discussed with Dr. Encarnacion    The patient is optimized for their procedure. AVS with information on surgery time/arrival time, meds and NPO status given by nursing staff. No further diagnostic testing indicated.    Please refer to the physical examination documented by the anesthesiologist in the anesthesia record on the day of surgery.    Video-Visit Details    Type of service:  Video Visit    Provider received verbal consent for a Video Visit from the patient? Yes     Originating Location (pt. Location): in her car in [place]    Distant Location (provider location):  Off-site  Mode of Communication:  Video Conference via [employer]  On the day of service:     Prep time: 10 minutes  Visit time: 19 minutes  Documentation time: 25 minutes  ------------------------------------------  Total time: 54 minutes      SERGIO Humphreys CNP  Preoperative Assessment Center  [facility]  [facility]  Phone: [phone]  Fax: [phone]

## 2023-08-21 NOTE — PROGRESS NOTES
Shira is a 58 year old who is being evaluated via a billable video visit.      How would you like to obtain your AVS? MyChart  If the video visit is dropped, the invitation should be resent by: Text to cell phone: [phone]    HPI           Review of Systems         Objective    Vitals - Patient Reported  Pain Score: Moderate Pain (4)  Pain Loc: Low Back        Physical Exam

## 2023-08-28 ASSESSMENT — COPD QUESTIONNAIRES
COPD: 1
CAT_SEVERITY: MILD

## 2023-08-28 NOTE — ANESTHESIA PREPROCEDURE EVALUATION
Anesthesia Pre-Procedure Evaluation    Patient: Shira Best   MRN: [id number] : 1965        Procedure : Procedure(s):  GASTRECTOMY, SLEEVE, LAPAROSCOPIC  HERNIORRHAPHY, HIATAL, LAPAROSCOPIC          No past medical history on file.   Past Surgical History:   Procedure Laterality Date    ESOPHAGOSCOPY, GASTROSCOPY, DUODENOSCOPY (EGD), COMBINED N/A 2023    Procedure: ESOPHAGOGASTRODUODENOSCOPY, WITH BIOPSY;  Surgeon: Gerard Higgins MD;  Location: [facility]      Allergies   Allergen Reactions    Amitriptyline      Tongue swells up and gets little dots on tongue    Amoxicillin      Sore tongue (no problems with Amoxicillin)  ++ has tolerated cephalexin and cefazolin ++    Sulindac      Lip swelling  3-7-17: Has tolerated other NSAIDs      Social History     Tobacco Use    Smoking status: Former     Types: Cigarettes    Smokeless tobacco: Never   Substance Use Topics    Alcohol use: Yes     Comment: Occasional      Wt Readings from Last 1 Encounters:   23 99.8 kg (220 lb)        Anesthesia Evaluation   Pt has had prior anesthetic. Type: General and MAC.    History of anesthetic complications  -  and PONV.  h/o migraines after anesthesia.    ROS/MED HX  ENT/Pulmonary:     (+)     CHILANGO risk factors (Reports she had a sleep study a couple years ago and was told she did not have CHILANGO.), snores loudly, hypertension, obese,                mild,  COPD (Reports that she uses her inhalers as prescribed.  Uses rescue inhaler about once a week.  Reports OVIEDO, Cough and phlegm are at her baseline.),              Neurologic: Comment: Fibromyalgia treated with gabapentin.     (+)    no peripheral neuropathy  migraines (Reports good control over the past year since starting Emgality.),                       (-) no CVA   Cardiovascular: Comment: -  Denies cardiac symptoms.      (+)  hypertension- -  CAD - past MI (NSTEMI>>[place]sobu variant) - -   Taking blood thinners        OVIEDO (No change in baseline  symptoms.).   pacemaker, Reason placed: Vent Fib. type: [employer], settings: DDDR,  ICD Reason placed:VF.  type;[employer] Settings DDDR           Previous cardiac testing     METS/Exercise Tolerance: >4 METS Comment: Rides bike ~ 30 minutes per day.     Hematologic:     (+) History of blood clots (PE in 2015.  Completed 6 months of warfarin),    pt is not anticoagulated,  history of blood transfusion, no previous transfusion reaction, Known PRBC Anitbodies:No - h/o blood transfusion noted in chart but patient  does not recall this.,      Musculoskeletal: Comment: Right AC joint surgery   (+)  arthritis (s/p b/l TKA and left hip),             GI/Hepatic:     (+) GERD, Asymptomatic on medication,                  Renal/Genitourinary:  - neg Renal ROS     Endo:     (+)               Obesity,    (-) Type II DM   Psychiatric/Substance Use:     (+) psychiatric history (Reports to be stable) anxiety    (-) alcohol abuse history and chronic opioid use history   Infectious Disease:  - neg infectious disease ROS     Malignancy:  - neg malignancy ROS     Other:  - neg other ROS    (+)  , H/O Chronic Pain,         Physical Exam    Airway        Mallampati: II   TM distance: > 3 FB   Neck ROM: full   Mouth opening: > 3 cm    Respiratory Devices and Support         Dental       (+) Modest Abnormalities - crowns, retainers, 1 or 2 missing teeth      Cardiovascular          Rhythm and rate: regular and normal     Pulmonary           breath sounds clear to auscultation           OUTSIDE LABS:  CBC:   Lab Results   Component Value Date    WBC 7.0 12/05/2022    HGB 12.4 12/05/2022    HCT 39.4 12/05/2022     12/05/2022     BMP:   Lab Results   Component Value Date     12/05/2022    POTASSIUM 3.9 12/05/2022    CHLORIDE 109 (H) 12/05/2022    CO2 25 12/05/2022    BUN 14.4 12/05/2022    CR 1.09 (H) 12/05/2022     (H) 12/05/2022     COAGS: No results found for: PTT, INR, FIBR  POC: No results found for:  BGM, HCG, HCGS  HEPATIC:   Lab Results   Component Value Date    ALBUMIN 3.4 (L) 12/05/2022    PROTTOTAL 7.2 12/05/2022    ALT 15 12/05/2022    AST 39 (H) 12/05/2022    ALKPHOS 257 (H) 12/05/2022    BILITOTAL 0.4 12/05/2022     OTHER:   Lab Results   Component Value Date    A1C 6.0 (H) 12/05/2022    SUSIE 9.3 12/05/2022       Anesthesia Plan    ASA Status:  3       Anesthesia Type: General.     - Airway: ETT   Induction: Intravenous, Propofol.   Maintenance: TIVA.   Techniques and Equipment:     - Airway: Video-Laryngoscope, Shoulder Willow/Ramp     - Lines/Monitors: BIS     - Drips/Meds: Phenylephrine     Consents    Anesthesia Plan(s) and associated risks, benefits, and realistic alternatives discussed. Questions answered and patient/representative(s) expressed understanding.     - Discussed: Risks, Benefits and Alternatives for BOTH SEDATION and the PROCEDURE were discussed     - Discussed with:  Patient            Postoperative Care    Pain management: IV analgesics, Oral pain medications, Multi-modal analgesia.   PONV prophylaxis: Ondansetron (or other 5HT-3), Background Propofol Infusion, Aprepitant     Comments:                Nova Jacobs MD

## 2023-08-29 ENCOUNTER — ANCILLARY PROCEDURE (OUTPATIENT)
Dept: CARDIOLOGY | Facility: CLINIC | Age: 58
DRG: 620 | End: 2023-08-29
Attending: SURGERY
Payer: COMMERCIAL

## 2023-08-29 ENCOUNTER — HOSPITAL ENCOUNTER (INPATIENT)
Facility: CLINIC | Age: 58
LOS: 1 days | Discharge: HOME | DRG: 620 | End: 2023-08-30
Attending: SURGERY | Admitting: SURGERY
Payer: COMMERCIAL

## 2023-08-29 ENCOUNTER — ANESTHESIA (OUTPATIENT)
Dept: ANESTHESIOLOGY | Facility: CLINIC | Age: 58
DRG: 620 | End: 2023-08-29
Payer: COMMERCIAL

## 2023-08-29 DIAGNOSIS — E66.01 MORBID OBESITY (H): ICD-10-CM

## 2023-08-29 DIAGNOSIS — Z98.84 S/P LAPAROSCOPIC SLEEVE GASTRECTOMY: Primary | ICD-10-CM

## 2023-08-29 LAB
ABO/RH(D): NORMAL
ALBUMIN SERPL BCG-MCNC: 3.3 G/DL (ref 3.5–5.2)
ALP SERPL-CCNC: 186 U/L (ref 35–104)
ALT SERPL W P-5'-P-CCNC: 17 U/L (ref 0–50)
ANION GAP SERPL CALCULATED.3IONS-SCNC: 9 MMOL/L (ref 7–15)
ANTIBODY SCREEN: NEGATIVE
AST SERPL W P-5'-P-CCNC: 50 U/L (ref 0–45)
BILIRUB SERPL-MCNC: 0.7 MG/DL
BUN SERPL-MCNC: 10.2 MG/DL (ref 6–20)
CALCIUM SERPL-MCNC: 8.8 MG/DL (ref 8.6–10)
CHLORIDE SERPL-SCNC: 110 MMOL/L (ref 98–107)
CREAT SERPL-MCNC: 0.92 MG/DL (ref 0.51–0.95)
CREAT SERPL-MCNC: 1.09 MG/DL (ref 0.51–0.95)
DEPRECATED HCO3 PLAS-SCNC: 22 MMOL/L (ref 22–29)
ERYTHROCYTE [DISTWIDTH] IN BLOOD BY AUTOMATED COUNT: 17.2 % (ref 10–15)
GFR SERPL CREATININE-BSD FRML MDRD: 59 ML/MIN/1.73M2
GFR SERPL CREATININE-BSD FRML MDRD: 72 ML/MIN/1.73M2
GLUCOSE BLDC GLUCOMTR-MCNC: 111 MG/DL (ref 70–99)
GLUCOSE SERPL-MCNC: 108 MG/DL (ref 70–99)
HCT VFR BLD AUTO: 37 % (ref 35–47)
HGB BLD-MCNC: 11.4 G/DL (ref 11.7–15.7)
MCH RBC QN AUTO: 31.8 PG (ref 26.5–33)
MCHC RBC AUTO-ENTMCNC: 30.8 G/DL (ref 31.5–36.5)
MCV RBC AUTO: 103 FL (ref 78–100)
MDC_IDC_LEAD_IMPLANT_DT: NORMAL
MDC_IDC_LEAD_LOCATION: NORMAL
MDC_IDC_LEAD_LOCATION_DETAIL_1: NORMAL
MDC_IDC_LEAD_MFG: NORMAL
MDC_IDC_LEAD_MODEL: NORMAL
MDC_IDC_LEAD_POLARITY_TYPE: NORMAL
MDC_IDC_LEAD_SERIAL: NORMAL
MDC_IDC_MSMT_BATTERY_DTM: NORMAL
MDC_IDC_MSMT_BATTERY_DTM: NORMAL
MDC_IDC_MSMT_BATTERY_REMAINING_LONGEVITY: 36 MO
MDC_IDC_MSMT_BATTERY_REMAINING_LONGEVITY: 36 MO
MDC_IDC_MSMT_BATTERY_REMAINING_PERCENTAGE: 43 %
MDC_IDC_MSMT_BATTERY_REMAINING_PERCENTAGE: 43 %
MDC_IDC_MSMT_BATTERY_STATUS: NORMAL
MDC_IDC_MSMT_BATTERY_STATUS: NORMAL
MDC_IDC_MSMT_CAP_CHARGE_DTM: NORMAL
MDC_IDC_MSMT_CAP_CHARGE_ENERGY: 31 J
MDC_IDC_MSMT_CAP_CHARGE_ENERGY: 31 J
MDC_IDC_MSMT_CAP_CHARGE_TIME: 11.2 S
MDC_IDC_MSMT_CAP_CHARGE_TIME: 11.2 S
MDC_IDC_MSMT_CAP_CHARGE_TIME: 5.8 S
MDC_IDC_MSMT_CAP_CHARGE_TIME: 5.8 S
MDC_IDC_MSMT_CAP_CHARGE_TYPE: NORMAL
MDC_IDC_MSMT_LEADCHNL_RA_IMPEDANCE_VALUE: 352 OHM
MDC_IDC_MSMT_LEADCHNL_RA_IMPEDANCE_VALUE: 373 OHM
MDC_IDC_MSMT_LEADCHNL_RA_PACING_THRESHOLD_AMPLITUDE: 0.9 V
MDC_IDC_MSMT_LEADCHNL_RA_PACING_THRESHOLD_PULSEWIDTH: 0.4 MS
MDC_IDC_MSMT_LEADCHNL_RV_IMPEDANCE_VALUE: 375 OHM
MDC_IDC_MSMT_LEADCHNL_RV_IMPEDANCE_VALUE: 384 OHM
MDC_IDC_MSMT_LEADCHNL_RV_PACING_THRESHOLD_AMPLITUDE: 0.9 V
MDC_IDC_MSMT_LEADCHNL_RV_PACING_THRESHOLD_PULSEWIDTH: 0.4 MS
MDC_IDC_PG_IMPLANT_DTM: NORMAL
MDC_IDC_PG_IMPLANT_DTM: NORMAL
MDC_IDC_PG_MFG: NORMAL
MDC_IDC_PG_MFG: NORMAL
MDC_IDC_PG_MODEL: NORMAL
MDC_IDC_PG_MODEL: NORMAL
MDC_IDC_PG_SERIAL: NORMAL
MDC_IDC_PG_SERIAL: NORMAL
MDC_IDC_PG_TYPE: NORMAL
MDC_IDC_PG_TYPE: NORMAL
MDC_IDC_SESS_CLINIC_NAME: NORMAL
MDC_IDC_SESS_CLINIC_NAME: NORMAL
MDC_IDC_SESS_DTM: NORMAL
MDC_IDC_SESS_DTM: NORMAL
MDC_IDC_SESS_TYPE: NORMAL
MDC_IDC_SESS_TYPE: NORMAL
MDC_IDC_SET_BRADY_AT_MODE_SWITCH_MODE: NORMAL
MDC_IDC_SET_BRADY_AT_MODE_SWITCH_MODE: NORMAL
MDC_IDC_SET_BRADY_AT_MODE_SWITCH_RATE: 170 {BEATS}/MIN
MDC_IDC_SET_BRADY_AT_MODE_SWITCH_RATE: 170 {BEATS}/MIN
MDC_IDC_SET_BRADY_LOWRATE: 60 {BEATS}/MIN
MDC_IDC_SET_BRADY_LOWRATE: 60 {BEATS}/MIN
MDC_IDC_SET_BRADY_MAX_SENSOR_RATE: 130 {BEATS}/MIN
MDC_IDC_SET_BRADY_MAX_SENSOR_RATE: 130 {BEATS}/MIN
MDC_IDC_SET_BRADY_MAX_TRACKING_RATE: 130 {BEATS}/MIN
MDC_IDC_SET_BRADY_MAX_TRACKING_RATE: 130 {BEATS}/MIN
MDC_IDC_SET_BRADY_MODE: NORMAL
MDC_IDC_SET_BRADY_MODE: NORMAL
MDC_IDC_SET_BRADY_PAV_DELAY_HIGH: 110 MS
MDC_IDC_SET_BRADY_PAV_DELAY_HIGH: 110 MS
MDC_IDC_SET_BRADY_PAV_DELAY_LOW: 220 MS
MDC_IDC_SET_BRADY_PAV_DELAY_LOW: 220 MS
MDC_IDC_SET_BRADY_SAV_DELAY_HIGH: 110 MS
MDC_IDC_SET_BRADY_SAV_DELAY_HIGH: 110 MS
MDC_IDC_SET_BRADY_SAV_DELAY_LOW: 220 MS
MDC_IDC_SET_BRADY_SAV_DELAY_LOW: 220 MS
MDC_IDC_SET_LEADCHNL_RA_PACING_AMPLITUDE: 2 V
MDC_IDC_SET_LEADCHNL_RA_PACING_AMPLITUDE: 2 V
MDC_IDC_SET_LEADCHNL_RA_PACING_POLARITY: NORMAL
MDC_IDC_SET_LEADCHNL_RA_PACING_POLARITY: NORMAL
MDC_IDC_SET_LEADCHNL_RA_PACING_PULSEWIDTH: 0.4 MS
MDC_IDC_SET_LEADCHNL_RA_PACING_PULSEWIDTH: 0.4 MS
MDC_IDC_SET_LEADCHNL_RA_SENSING_ADAPTATION_MODE: NORMAL
MDC_IDC_SET_LEADCHNL_RA_SENSING_ADAPTATION_MODE: NORMAL
MDC_IDC_SET_LEADCHNL_RA_SENSING_POLARITY: NORMAL
MDC_IDC_SET_LEADCHNL_RA_SENSING_POLARITY: NORMAL
MDC_IDC_SET_LEADCHNL_RA_SENSING_SENSITIVITY: 0.25 MV
MDC_IDC_SET_LEADCHNL_RA_SENSING_SENSITIVITY: 0.25 MV
MDC_IDC_SET_LEADCHNL_RV_PACING_AMPLITUDE: 2.5 V
MDC_IDC_SET_LEADCHNL_RV_PACING_AMPLITUDE: 2.5 V
MDC_IDC_SET_LEADCHNL_RV_PACING_POLARITY: NORMAL
MDC_IDC_SET_LEADCHNL_RV_PACING_POLARITY: NORMAL
MDC_IDC_SET_LEADCHNL_RV_PACING_PULSEWIDTH: 0.4 MS
MDC_IDC_SET_LEADCHNL_RV_PACING_PULSEWIDTH: 0.4 MS
MDC_IDC_SET_LEADCHNL_RV_SENSING_ADAPTATION_MODE: NORMAL
MDC_IDC_SET_LEADCHNL_RV_SENSING_ADAPTATION_MODE: NORMAL
MDC_IDC_SET_LEADCHNL_RV_SENSING_POLARITY: NORMAL
MDC_IDC_SET_LEADCHNL_RV_SENSING_POLARITY: NORMAL
MDC_IDC_SET_LEADCHNL_RV_SENSING_SENSITIVITY: 0.6 MV
MDC_IDC_SET_LEADCHNL_RV_SENSING_SENSITIVITY: 0.6 MV
MDC_IDC_SET_ZONE_DETECTION_INTERVAL: 273 MS
MDC_IDC_SET_ZONE_DETECTION_INTERVAL: 333 MS
MDC_IDC_SET_ZONE_DETECTION_INTERVAL: 400 MS
MDC_IDC_SET_ZONE_TYPE: NORMAL
MDC_IDC_SET_ZONE_VENDOR_TYPE: NORMAL
MDC_IDC_STAT_AT_BURDEN_PERCENT: 0 %
MDC_IDC_STAT_AT_BURDEN_PERCENT: 0 %
MDC_IDC_STAT_AT_DTM_END: NORMAL
MDC_IDC_STAT_AT_DTM_END: NORMAL
MDC_IDC_STAT_AT_DTM_START: NORMAL
MDC_IDC_STAT_AT_DTM_START: NORMAL
MDC_IDC_STAT_BRADY_DTM_END: NORMAL
MDC_IDC_STAT_BRADY_DTM_END: NORMAL
MDC_IDC_STAT_BRADY_DTM_START: NORMAL
MDC_IDC_STAT_BRADY_DTM_START: NORMAL
MDC_IDC_STAT_BRADY_RA_PERCENT_PACED: 58 %
MDC_IDC_STAT_BRADY_RA_PERCENT_PACED: 58 %
MDC_IDC_STAT_BRADY_RV_PERCENT_PACED: 0 %
MDC_IDC_STAT_BRADY_RV_PERCENT_PACED: 0 %
MDC_IDC_STAT_EPISODE_RECENT_COUNT: 0
MDC_IDC_STAT_EPISODE_RECENT_COUNT_DTM_END: NORMAL
MDC_IDC_STAT_EPISODE_RECENT_COUNT_DTM_START: NORMAL
MDC_IDC_STAT_EPISODE_TYPE: NORMAL
MDC_IDC_STAT_EPISODE_VENDOR_TYPE: NORMAL
MDC_IDC_STAT_TACHYTHERAPY_ATP_DELIVERED_RECENT: 0
MDC_IDC_STAT_TACHYTHERAPY_ATP_DELIVERED_RECENT: 0
MDC_IDC_STAT_TACHYTHERAPY_ATP_DELIVERED_TOTAL: 0
MDC_IDC_STAT_TACHYTHERAPY_ATP_DELIVERED_TOTAL: 0
MDC_IDC_STAT_TACHYTHERAPY_RECENT_DTM_END: NORMAL
MDC_IDC_STAT_TACHYTHERAPY_RECENT_DTM_END: NORMAL
MDC_IDC_STAT_TACHYTHERAPY_RECENT_DTM_START: NORMAL
MDC_IDC_STAT_TACHYTHERAPY_RECENT_DTM_START: NORMAL
MDC_IDC_STAT_TACHYTHERAPY_SHOCKS_ABORTED_RECENT: 0
MDC_IDC_STAT_TACHYTHERAPY_SHOCKS_ABORTED_RECENT: 0
MDC_IDC_STAT_TACHYTHERAPY_SHOCKS_ABORTED_TOTAL: 1
MDC_IDC_STAT_TACHYTHERAPY_SHOCKS_ABORTED_TOTAL: 1
MDC_IDC_STAT_TACHYTHERAPY_SHOCKS_DELIVERED_RECENT: 0
MDC_IDC_STAT_TACHYTHERAPY_SHOCKS_DELIVERED_RECENT: 0
MDC_IDC_STAT_TACHYTHERAPY_SHOCKS_DELIVERED_TOTAL: 3
MDC_IDC_STAT_TACHYTHERAPY_SHOCKS_DELIVERED_TOTAL: 3
MDC_IDC_STAT_TACHYTHERAPY_TOTAL_DTM_END: NORMAL
MDC_IDC_STAT_TACHYTHERAPY_TOTAL_DTM_END: NORMAL
MDC_IDC_STAT_TACHYTHERAPY_TOTAL_DTM_START: NORMAL
MDC_IDC_STAT_TACHYTHERAPY_TOTAL_DTM_START: NORMAL
PLATELET # BLD AUTO: 162 10E3/UL (ref 150–450)
POTASSIUM SERPL-SCNC: 3.5 MMOL/L (ref 3.4–5.3)
PROT SERPL-MCNC: 6.6 G/DL (ref 6.4–8.3)
RBC # BLD AUTO: 3.59 10E6/UL (ref 3.8–5.2)
SODIUM SERPL-SCNC: 141 MMOL/L (ref 136–145)
SPECIMEN EXPIRATION DATE: NORMAL
WBC # BLD AUTO: 6.2 10E3/UL (ref 4–11)

## 2023-08-29 PROCEDURE — 250N000011 HC RX IP 250 OP 636

## 2023-08-29 PROCEDURE — 85027 COMPLETE CBC AUTOMATED: CPT | Performed by: NURSE PRACTITIONER

## 2023-08-29 PROCEDURE — 82565 ASSAY OF CREATININE: CPT | Performed by: NURSE PRACTITIONER

## 2023-08-29 PROCEDURE — 370N000017 HC ANESTHESIA TECHNICAL FEE, PER MIN: Performed by: SURGERY

## 2023-08-29 PROCEDURE — 36415 COLL VENOUS BLD VENIPUNCTURE: CPT | Performed by: NURSE PRACTITIONER

## 2023-08-29 PROCEDURE — 80053 COMPREHEN METABOLIC PANEL: CPT | Performed by: NURSE PRACTITIONER

## 2023-08-29 PROCEDURE — 93287 PERI-PX DEVICE EVAL & PRGR: CPT

## 2023-08-29 PROCEDURE — 250N000011 HC RX IP 250 OP 636: Mod: JZ | Performed by: NURSE PRACTITIONER

## 2023-08-29 PROCEDURE — 0DB64Z3 EXCISION OF STOMACH, PERCUTANEOUS ENDOSCOPIC APPROACH, VERTICAL: ICD-10-PCS | Performed by: SURGERY

## 2023-08-29 PROCEDURE — 250N000013 HC RX MED GY IP 250 OP 250 PS 637: Performed by: SURGERY

## 2023-08-29 PROCEDURE — 120N000002 HC R&B MED SURG/OB UMMC

## 2023-08-29 PROCEDURE — 258N000003 HC RX IP 258 OP 636: Performed by: NURSE PRACTITIONER

## 2023-08-29 PROCEDURE — 250N000009 HC RX 250

## 2023-08-29 PROCEDURE — 250N000011 HC RX IP 250 OP 636: Mod: JZ | Performed by: SURGERY

## 2023-08-29 PROCEDURE — 88305 TISSUE EXAM BY PATHOLOGIST: CPT | Mod: 26 | Performed by: PATHOLOGY

## 2023-08-29 PROCEDURE — 272N000001 HC OR GENERAL SUPPLY STERILE: Performed by: SURGERY

## 2023-08-29 PROCEDURE — 86850 RBC ANTIBODY SCREEN: CPT | Performed by: NURSE PRACTITIONER

## 2023-08-29 PROCEDURE — 258N000003 HC RX IP 258 OP 636

## 2023-08-29 PROCEDURE — 360N000077 HC SURGERY LEVEL 4, PER MIN: Performed by: SURGERY

## 2023-08-29 PROCEDURE — 43775 LAP SLEEVE GASTRECTOMY: CPT | Performed by: SURGERY

## 2023-08-29 PROCEDURE — 250N000011 HC RX IP 250 OP 636: Mod: JZ

## 2023-08-29 PROCEDURE — 250N000009 HC RX 250: Performed by: NURSE PRACTITIONER

## 2023-08-29 PROCEDURE — 86901 BLOOD TYPING SEROLOGIC RH(D): CPT | Performed by: NURSE PRACTITIONER

## 2023-08-29 PROCEDURE — 88305 TISSUE EXAM BY PATHOLOGIST: CPT | Mod: TC | Performed by: SURGERY

## 2023-08-29 PROCEDURE — 250N000013 HC RX MED GY IP 250 OP 250 PS 637: Performed by: NURSE PRACTITIONER

## 2023-08-29 PROCEDURE — 999N000141 HC STATISTIC PRE-PROCEDURE NURSING ASSESSMENT: Performed by: SURGERY

## 2023-08-29 PROCEDURE — 710N000009 HC RECOVERY PHASE 1, LEVEL 1, PER MIN: Performed by: SURGERY

## 2023-08-29 PROCEDURE — 258N000003 HC RX IP 258 OP 636: Performed by: SURGERY

## 2023-08-29 PROCEDURE — 93287 PERI-PX DEVICE EVAL & PRGR: CPT | Mod: 26 | Performed by: INTERNAL MEDICINE

## 2023-08-29 RX ORDER — BUTALBITAL, ACETAMINOPHEN AND CAFFEINE 50; 325; 40 MG/1; MG/1; MG/1
1 TABLET ORAL EVERY 6 HOURS PRN
Status: DISCONTINUED | OUTPATIENT
Start: 2023-08-29 | End: 2023-08-30 | Stop reason: HOSPADM

## 2023-08-29 RX ORDER — BENZONATATE 100 MG/1
200 CAPSULE ORAL 3 TIMES DAILY PRN
Status: DISCONTINUED | OUTPATIENT
Start: 2023-08-29 | End: 2023-08-30 | Stop reason: HOSPADM

## 2023-08-29 RX ORDER — NALOXONE HYDROCHLORIDE 0.4 MG/ML
0.4 INJECTION, SOLUTION INTRAMUSCULAR; INTRAVENOUS; SUBCUTANEOUS
Status: DISCONTINUED | OUTPATIENT
Start: 2023-08-29 | End: 2023-08-30 | Stop reason: HOSPADM

## 2023-08-29 RX ORDER — CLINDAMYCIN PHOSPHATE 900 MG/50ML
900 INJECTION, SOLUTION INTRAVENOUS
Status: DISCONTINUED | OUTPATIENT
Start: 2023-08-29 | End: 2023-08-29

## 2023-08-29 RX ORDER — PROPOFOL 10 MG/ML
INJECTION, EMULSION INTRAVENOUS PRN
Status: DISCONTINUED | OUTPATIENT
Start: 2023-08-29 | End: 2023-08-29

## 2023-08-29 RX ORDER — SODIUM CHLORIDE, SODIUM LACTATE, POTASSIUM CHLORIDE, CALCIUM CHLORIDE 600; 310; 30; 20 MG/100ML; MG/100ML; MG/100ML; MG/100ML
INJECTION, SOLUTION INTRAVENOUS CONTINUOUS
Status: DISCONTINUED | OUTPATIENT
Start: 2023-08-29 | End: 2023-08-30 | Stop reason: HOSPADM

## 2023-08-29 RX ORDER — ENOXAPARIN SODIUM 100 MG/ML
40 INJECTION SUBCUTANEOUS
Status: COMPLETED | OUTPATIENT
Start: 2023-08-29 | End: 2023-08-29

## 2023-08-29 RX ORDER — SODIUM CHLORIDE, SODIUM LACTATE, POTASSIUM CHLORIDE, CALCIUM CHLORIDE 600; 310; 30; 20 MG/100ML; MG/100ML; MG/100ML; MG/100ML
INJECTION, SOLUTION INTRAVENOUS CONTINUOUS PRN
Status: DISCONTINUED | OUTPATIENT
Start: 2023-08-29 | End: 2023-08-29

## 2023-08-29 RX ORDER — DIPHENHYDRAMINE HYDROCHLORIDE 50 MG/ML
25 INJECTION INTRAMUSCULAR; INTRAVENOUS EVERY 6 HOURS PRN
Status: DISCONTINUED | OUTPATIENT
Start: 2023-08-29 | End: 2023-08-30 | Stop reason: HOSPADM

## 2023-08-29 RX ORDER — AMOXICILLIN 250 MG
2 CAPSULE ORAL 2 TIMES DAILY
Status: DISCONTINUED | OUTPATIENT
Start: 2023-08-29 | End: 2023-08-30 | Stop reason: HOSPADM

## 2023-08-29 RX ORDER — ONDANSETRON 2 MG/ML
4 INJECTION INTRAMUSCULAR; INTRAVENOUS
Status: COMPLETED | OUTPATIENT
Start: 2023-08-29 | End: 2023-08-29

## 2023-08-29 RX ORDER — FENTANYL CITRATE 50 UG/ML
INJECTION, SOLUTION INTRAMUSCULAR; INTRAVENOUS PRN
Status: DISCONTINUED | OUTPATIENT
Start: 2023-08-29 | End: 2023-08-29

## 2023-08-29 RX ORDER — ONDANSETRON 4 MG/1
4 TABLET, ORALLY DISINTEGRATING ORAL EVERY 30 MIN PRN
Status: DISCONTINUED | OUTPATIENT
Start: 2023-08-29 | End: 2023-08-29 | Stop reason: HOSPADM

## 2023-08-29 RX ORDER — ONDANSETRON 4 MG/1
4 TABLET, ORALLY DISINTEGRATING ORAL EVERY 6 HOURS PRN
Status: DISCONTINUED | OUTPATIENT
Start: 2023-08-29 | End: 2023-08-30 | Stop reason: HOSPADM

## 2023-08-29 RX ORDER — OXYCODONE HYDROCHLORIDE 5 MG/1
5 TABLET ORAL
Status: DISCONTINUED | OUTPATIENT
Start: 2023-08-29 | End: 2023-08-30 | Stop reason: HOSPADM

## 2023-08-29 RX ORDER — ALBUTEROL SULFATE 90 UG/1
1-2 AEROSOL, METERED RESPIRATORY (INHALATION) EVERY 4 HOURS PRN
Status: DISCONTINUED | OUTPATIENT
Start: 2023-08-29 | End: 2023-08-30 | Stop reason: HOSPADM

## 2023-08-29 RX ORDER — ENALAPRILAT 1.25 MG/ML
1.25 INJECTION INTRAVENOUS EVERY 6 HOURS PRN
Status: DISCONTINUED | OUTPATIENT
Start: 2023-08-29 | End: 2023-08-30 | Stop reason: HOSPADM

## 2023-08-29 RX ORDER — HYDROMORPHONE HCL IN WATER/PF 6 MG/30 ML
0.2 PATIENT CONTROLLED ANALGESIA SYRINGE INTRAVENOUS EVERY 5 MIN PRN
Status: DISCONTINUED | OUTPATIENT
Start: 2023-08-29 | End: 2023-08-29 | Stop reason: HOSPADM

## 2023-08-29 RX ORDER — FAMOTIDINE 20 MG/1
40 TABLET, FILM COATED ORAL AT BEDTIME
Status: DISCONTINUED | OUTPATIENT
Start: 2023-08-29 | End: 2023-08-30 | Stop reason: HOSPADM

## 2023-08-29 RX ORDER — SODIUM CHLORIDE, SODIUM LACTATE, POTASSIUM CHLORIDE, CALCIUM CHLORIDE 600; 310; 30; 20 MG/100ML; MG/100ML; MG/100ML; MG/100ML
INJECTION, SOLUTION INTRAVENOUS CONTINUOUS
Status: DISCONTINUED | OUTPATIENT
Start: 2023-08-29 | End: 2023-08-29 | Stop reason: HOSPADM

## 2023-08-29 RX ORDER — LIDOCAINE HYDROCHLORIDE 20 MG/ML
INJECTION, SOLUTION INFILTRATION; PERINEURAL PRN
Status: DISCONTINUED | OUTPATIENT
Start: 2023-08-29 | End: 2023-08-29

## 2023-08-29 RX ORDER — SCOLOPAMINE TRANSDERMAL SYSTEM 1 MG/1
1 PATCH, EXTENDED RELEASE TRANSDERMAL
Status: DISCONTINUED | OUTPATIENT
Start: 2023-08-29 | End: 2023-08-30 | Stop reason: HOSPADM

## 2023-08-29 RX ORDER — EPHEDRINE SULFATE 50 MG/ML
INJECTION, SOLUTION INTRAMUSCULAR; INTRAVENOUS; SUBCUTANEOUS PRN
Status: DISCONTINUED | OUTPATIENT
Start: 2023-08-29 | End: 2023-08-29

## 2023-08-29 RX ORDER — ACETAMINOPHEN 325 MG/1
650 TABLET ORAL EVERY 4 HOURS PRN
Status: DISCONTINUED | OUTPATIENT
Start: 2023-08-29 | End: 2023-08-30 | Stop reason: HOSPADM

## 2023-08-29 RX ORDER — PROPOFOL 10 MG/ML
INJECTION, EMULSION INTRAVENOUS CONTINUOUS PRN
Status: DISCONTINUED | OUTPATIENT
Start: 2023-08-29 | End: 2023-08-29

## 2023-08-29 RX ORDER — TOPIRAMATE 100 MG/1
100 TABLET, FILM COATED ORAL AT BEDTIME
Status: DISCONTINUED | OUTPATIENT
Start: 2023-08-29 | End: 2023-08-30 | Stop reason: HOSPADM

## 2023-08-29 RX ORDER — BUSPIRONE HYDROCHLORIDE 5 MG/1
10 TABLET ORAL 3 TIMES DAILY
Status: DISCONTINUED | OUTPATIENT
Start: 2023-08-29 | End: 2023-08-30 | Stop reason: HOSPADM

## 2023-08-29 RX ORDER — HYDROMORPHONE HCL IN WATER/PF 6 MG/30 ML
0.2 PATIENT CONTROLLED ANALGESIA SYRINGE INTRAVENOUS
Status: DISCONTINUED | OUTPATIENT
Start: 2023-08-29 | End: 2023-08-30 | Stop reason: HOSPADM

## 2023-08-29 RX ORDER — GABAPENTIN 300 MG/1
900 CAPSULE ORAL 3 TIMES DAILY
Status: DISCONTINUED | OUTPATIENT
Start: 2023-08-29 | End: 2023-08-30

## 2023-08-29 RX ORDER — LIDOCAINE 40 MG/G
CREAM TOPICAL
Status: DISCONTINUED | OUTPATIENT
Start: 2023-08-29 | End: 2023-08-30 | Stop reason: HOSPADM

## 2023-08-29 RX ORDER — NALOXONE HYDROCHLORIDE 0.4 MG/ML
0.2 INJECTION, SOLUTION INTRAMUSCULAR; INTRAVENOUS; SUBCUTANEOUS
Status: DISCONTINUED | OUTPATIENT
Start: 2023-08-29 | End: 2023-08-30 | Stop reason: HOSPADM

## 2023-08-29 RX ORDER — METOPROLOL SUCCINATE 25 MG/1
25 TABLET, EXTENDED RELEASE ORAL DAILY
Status: DISCONTINUED | OUTPATIENT
Start: 2023-08-30 | End: 2023-08-29 | Stop reason: ALTCHOICE

## 2023-08-29 RX ORDER — SOTALOL HYDROCHLORIDE 80 MG/1
80 TABLET ORAL 2 TIMES DAILY
Status: DISCONTINUED | OUTPATIENT
Start: 2023-08-29 | End: 2023-08-30 | Stop reason: HOSPADM

## 2023-08-29 RX ORDER — ONDANSETRON 2 MG/ML
4 INJECTION INTRAMUSCULAR; INTRAVENOUS EVERY 6 HOURS PRN
Status: DISCONTINUED | OUTPATIENT
Start: 2023-08-29 | End: 2023-08-30 | Stop reason: HOSPADM

## 2023-08-29 RX ORDER — FENTANYL CITRATE 50 UG/ML
50 INJECTION, SOLUTION INTRAMUSCULAR; INTRAVENOUS EVERY 5 MIN PRN
Status: DISCONTINUED | OUTPATIENT
Start: 2023-08-29 | End: 2023-08-29 | Stop reason: HOSPADM

## 2023-08-29 RX ORDER — PROCHLORPERAZINE MALEATE 5 MG
10 TABLET ORAL EVERY 6 HOURS PRN
Status: DISCONTINUED | OUTPATIENT
Start: 2023-08-29 | End: 2023-08-30 | Stop reason: HOSPADM

## 2023-08-29 RX ORDER — CLINDAMYCIN PHOSPHATE 900 MG/50ML
900 INJECTION, SOLUTION INTRAVENOUS SEE ADMIN INSTRUCTIONS
Status: DISCONTINUED | OUTPATIENT
Start: 2023-08-29 | End: 2023-08-29

## 2023-08-29 RX ORDER — PHENYLEPHRINE HCL IN 0.9% NACL 50MG/250ML
.5-1.25 PLASTIC BAG, INJECTION (ML) INTRAVENOUS CONTINUOUS
Status: DISCONTINUED | OUTPATIENT
Start: 2023-08-29 | End: 2023-08-30

## 2023-08-29 RX ORDER — ACETAMINOPHEN 325 MG/1
975 TABLET ORAL ONCE
Status: COMPLETED | OUTPATIENT
Start: 2023-08-29 | End: 2023-08-29

## 2023-08-29 RX ORDER — HYDROMORPHONE HCL IN WATER/PF 6 MG/30 ML
0.4 PATIENT CONTROLLED ANALGESIA SYRINGE INTRAVENOUS EVERY 5 MIN PRN
Status: DISCONTINUED | OUTPATIENT
Start: 2023-08-29 | End: 2023-08-29 | Stop reason: HOSPADM

## 2023-08-29 RX ORDER — OXYCODONE HYDROCHLORIDE 10 MG/1
10 TABLET ORAL
Status: DISCONTINUED | OUTPATIENT
Start: 2023-08-29 | End: 2023-08-30 | Stop reason: HOSPADM

## 2023-08-29 RX ORDER — ONDANSETRON 2 MG/ML
4 INJECTION INTRAMUSCULAR; INTRAVENOUS EVERY 30 MIN PRN
Status: DISCONTINUED | OUTPATIENT
Start: 2023-08-29 | End: 2023-08-29 | Stop reason: HOSPADM

## 2023-08-29 RX ORDER — PANTOPRAZOLE SODIUM 40 MG/1
40 TABLET, DELAYED RELEASE ORAL AT BEDTIME
Status: DISCONTINUED | OUTPATIENT
Start: 2023-08-29 | End: 2023-08-29 | Stop reason: ALTCHOICE

## 2023-08-29 RX ORDER — FENTANYL CITRATE 50 UG/ML
25 INJECTION, SOLUTION INTRAMUSCULAR; INTRAVENOUS EVERY 5 MIN PRN
Status: DISCONTINUED | OUTPATIENT
Start: 2023-08-29 | End: 2023-08-29 | Stop reason: HOSPADM

## 2023-08-29 RX ORDER — DIPHENHYDRAMINE HCL 25 MG
25 CAPSULE ORAL EVERY 6 HOURS PRN
Status: DISCONTINUED | OUTPATIENT
Start: 2023-08-29 | End: 2023-08-30 | Stop reason: HOSPADM

## 2023-08-29 RX ORDER — CEFAZOLIN SODIUM/WATER 2 G/20 ML
SYRINGE (ML) INTRAVENOUS PRN
Status: DISCONTINUED | OUTPATIENT
Start: 2023-08-29 | End: 2023-08-29

## 2023-08-29 RX ORDER — FLUTICASONE FUROATE AND VILANTEROL 200; 25 UG/1; UG/1
1 POWDER RESPIRATORY (INHALATION) DAILY
Status: DISCONTINUED | OUTPATIENT
Start: 2023-08-29 | End: 2023-08-30 | Stop reason: HOSPADM

## 2023-08-29 RX ORDER — ENOXAPARIN SODIUM 100 MG/ML
40 INJECTION SUBCUTANEOUS EVERY 12 HOURS
Status: DISCONTINUED | OUTPATIENT
Start: 2023-08-29 | End: 2023-08-30 | Stop reason: HOSPADM

## 2023-08-29 RX ADMIN — MIDAZOLAM 2 MG: 1 INJECTION INTRAMUSCULAR; INTRAVENOUS at 08:16

## 2023-08-29 RX ADMIN — SODIUM CHLORIDE, POTASSIUM CHLORIDE, SODIUM LACTATE AND CALCIUM CHLORIDE: 600; 310; 30; 20 INJECTION, SOLUTION INTRAVENOUS at 08:09

## 2023-08-29 RX ADMIN — EPHEDRINE SULFATE 10 MG: 5 INJECTION INTRAVENOUS at 09:18

## 2023-08-29 RX ADMIN — LIDOCAINE HYDROCHLORIDE 100 MG: 20 INJECTION, SOLUTION INFILTRATION; PERINEURAL at 08:16

## 2023-08-29 RX ADMIN — FENTANYL CITRATE 100 MCG: 50 INJECTION, SOLUTION INTRAMUSCULAR; INTRAVENOUS at 08:16

## 2023-08-29 RX ADMIN — ROCURONIUM BROMIDE 50 MG: 50 INJECTION, SOLUTION INTRAVENOUS at 08:19

## 2023-08-29 RX ADMIN — PHENYLEPHRINE HYDROCHLORIDE 200 MCG: 10 INJECTION INTRAVENOUS at 08:19

## 2023-08-29 RX ADMIN — TOPIRAMATE 100 MG: 100 TABLET, FILM COATED ORAL at 21:07

## 2023-08-29 RX ADMIN — FENTANYL CITRATE 50 MCG: 50 INJECTION, SOLUTION INTRAMUSCULAR; INTRAVENOUS at 09:51

## 2023-08-29 RX ADMIN — EPHEDRINE SULFATE 5 MG: 5 INJECTION INTRAVENOUS at 09:09

## 2023-08-29 RX ADMIN — PHENYLEPHRINE HYDROCHLORIDE 0.6 MCG: 10 INJECTION INTRAVENOUS at 08:44

## 2023-08-29 RX ADMIN — FENTANYL CITRATE 25 MCG: 50 INJECTION, SOLUTION INTRAMUSCULAR; INTRAVENOUS at 09:43

## 2023-08-29 RX ADMIN — EPHEDRINE SULFATE 10 MG: 5 INJECTION INTRAVENOUS at 08:30

## 2023-08-29 RX ADMIN — UMECLIDINIUM 1 PUFF: 62.5 AEROSOL, POWDER ORAL at 19:10

## 2023-08-29 RX ADMIN — ENOXAPARIN SODIUM 40 MG: 40 INJECTION SUBCUTANEOUS at 07:03

## 2023-08-29 RX ADMIN — OXYCODONE HYDROCHLORIDE 5 MG: 5 TABLET ORAL at 19:00

## 2023-08-29 RX ADMIN — ONDANSETRON 4 MG: 2 INJECTION INTRAMUSCULAR; INTRAVENOUS at 08:58

## 2023-08-29 RX ADMIN — FENTANYL CITRATE 25 MCG: 50 INJECTION, SOLUTION INTRAMUSCULAR; INTRAVENOUS at 10:06

## 2023-08-29 RX ADMIN — OXYCODONE HYDROCHLORIDE 5 MG: 5 TABLET ORAL at 23:45

## 2023-08-29 RX ADMIN — PHENYLEPHRINE HYDROCHLORIDE 0.4 MCG: 10 INJECTION INTRAVENOUS at 09:20

## 2023-08-29 RX ADMIN — PROPOFOL 100 MG: 10 INJECTION, EMULSION INTRAVENOUS at 08:18

## 2023-08-29 RX ADMIN — FENTANYL CITRATE 25 MCG: 50 INJECTION, SOLUTION INTRAMUSCULAR; INTRAVENOUS at 11:50

## 2023-08-29 RX ADMIN — SOTALOL HYDROCHLORIDE 80 MG: 80 TABLET ORAL at 19:07

## 2023-08-29 RX ADMIN — SCOPALAMINE 1 PATCH: 1 PATCH, EXTENDED RELEASE TRANSDERMAL at 17:16

## 2023-08-29 RX ADMIN — SUGAMMADEX 200 MG: 100 INJECTION, SOLUTION INTRAVENOUS at 08:59

## 2023-08-29 RX ADMIN — PHENYLEPHRINE HYDROCHLORIDE 0.8 MCG: 10 INJECTION INTRAVENOUS at 08:38

## 2023-08-29 RX ADMIN — SUGAMMADEX 200 MG: 100 INJECTION, SOLUTION INTRAVENOUS at 09:16

## 2023-08-29 RX ADMIN — SODIUM CHLORIDE, POTASSIUM CHLORIDE, SODIUM LACTATE AND CALCIUM CHLORIDE: 600; 310; 30; 20 INJECTION, SOLUTION INTRAVENOUS at 10:23

## 2023-08-29 RX ADMIN — PROPOFOL 100 MCG/KG/MIN: 10 INJECTION, EMULSION INTRAVENOUS at 08:18

## 2023-08-29 RX ADMIN — ENOXAPARIN SODIUM 40 MG: 40 INJECTION SUBCUTANEOUS at 21:07

## 2023-08-29 RX ADMIN — FAMOTIDINE 40 MG: 20 TABLET ORAL at 21:07

## 2023-08-29 RX ADMIN — BUSPIRONE HYDROCHLORIDE 10 MG: 5 TABLET ORAL at 19:01

## 2023-08-29 RX ADMIN — FLUTICASONE FUROATE AND VILANTEROL TRIFENATATE 1 PUFF: 200; 25 POWDER RESPIRATORY (INHALATION) at 19:08

## 2023-08-29 RX ADMIN — SODIUM CHLORIDE, POTASSIUM CHLORIDE, SODIUM LACTATE AND CALCIUM CHLORIDE: 600; 310; 30; 20 INJECTION, SOLUTION INTRAVENOUS at 17:20

## 2023-08-29 RX ADMIN — PHENYLEPHRINE HYDROCHLORIDE 0.4 MCG/KG/MIN: 10 INJECTION INTRAVENOUS at 08:18

## 2023-08-29 RX ADMIN — OXYCODONE HYDROCHLORIDE 5 MG: 5 TABLET ORAL at 15:24

## 2023-08-29 RX ADMIN — Medication 2 G: at 08:15

## 2023-08-29 RX ADMIN — FENTANYL CITRATE 150 MCG: 50 INJECTION, SOLUTION INTRAMUSCULAR; INTRAVENOUS at 08:18

## 2023-08-29 RX ADMIN — ACETAMINOPHEN 975 MG: 325 TABLET, FILM COATED ORAL at 07:02

## 2023-08-29 RX ADMIN — SENNOSIDES AND DOCUSATE SODIUM 2 TABLET: 50; 8.6 TABLET ORAL at 19:02

## 2023-08-29 RX ADMIN — FOSAPREPITANT 150 MG: 150 INJECTION, POWDER, LYOPHILIZED, FOR SOLUTION INTRAVENOUS at 08:26

## 2023-08-29 RX ADMIN — FENTANYL CITRATE 25 MCG: 50 INJECTION, SOLUTION INTRAMUSCULAR; INTRAVENOUS at 11:40

## 2023-08-29 RX ADMIN — PHENYLEPHRINE HYDROCHLORIDE 0.6 MCG: 10 INJECTION INTRAVENOUS at 08:31

## 2023-08-29 RX ADMIN — GABAPENTIN 900 MG: 300 CAPSULE ORAL at 19:01

## 2023-08-29 ASSESSMENT — ACTIVITIES OF DAILY LIVING (ADL)
ADLS_ACUITY_SCORE: 22
ADLS_ACUITY_SCORE: 20
ADLS_ACUITY_SCORE: 22

## 2023-08-29 NOTE — OP NOTE
[facility]    Operative Note    Pre-operative diagnosis: Morbid obesity (H) [E66.01]   Post-operative diagnosis * No post-op diagnosis entered *   Procedure: Procedure(s):  GASTRECTOMY, SLEEVE, LAPAROSCOPIC   Surgeon: Surgeon(s) and Role:     * Gerard Higgins MD - Primary   Assistant Surgeon                      Anesthesia: The assistance of Maryanne Urena NP was required in this case due to advanced laparoscopy technique; she assisted by performing port assistance and providing exposure to patient bedside while I was dissecting.    I attest that no qualified resident or fellow was available to assist for this surgery due adequate training and skills to assist with this technically challenging case and instrumentation; as a consequence, I attest that Maryanne performed these needed skills.  General    Estimated blood loss: Less than 50 ml   Drains: None   Specimens: ID Type Source Tests Collected by Time Destination   1 : Partial gastrectomy Tissue Stomach, Greater Curvature SURGICAL PATHOLOGY EXAM Gerard Higgins MD 8/29/2023  8:53 AM       Findings: Central obesity; no hiatal hernia.  Some cirrhosis of liver.   Complications: None   Implants: None         BOUGIE SIZE: 40 FR  DISTANCE FROM PYLORUS: 10 CM  STAPLE LINE REINFORCEMENT: NO  STAPLE LINE OVERSEW: NO  COMORBIDITIES: History reviewed. No pertinent past medical history.    INDICATIONS FOR PROCEDURE  Shira Best is a 58 year old female who is morbidly obese.  Numerous weight loss attempts without surgery have been without success.     After understanding the risks and benefits of proceeding with a laparoscopic vertical sleeve gastrectomy, she agreed to an operation as outlined by me.    I reviewed the risks of surgery with Shira Best.    These include, but are not limited to, death, myocardial infarction, pneumonia, urinary tract infection, deep venous thrombosis with or without pulmonary  "embolus, abdominal infection from bowel injury or abscess, bowel obstruction, wound infection, and bleeding; furthermore, there is risk that the intended approach of the surgery (for example, laparoscopic) would need to be changed to open (laparotomy) if laparoscopy does not continue to be safe  Finally, there is also potential that the intended procedure will NOT provide benefit.    More specific risks related to vertical sleeve gastrectomy were detailed at the bariatric informational seminar and include the followin.) leak at the vertical sleeve staple line, 2.) stricture in the sleeve, 3.) nausea, vomiting, and dehydration for several months, 4.) adhesions causing bowel obstruction, 5.) rapid weight loss causing a higher rate of gallstone formation during the first 6 months after surgery, 6.) decreased absorption of vitamins because of the reduced stomach size, 7.) weight regain if inappropriate food intake occurs.    The BMI that we are treating this patient for was measured at the initial consultation visit in our bariatric program and it was: 46.2 kg/m2 (as calculated just below).      The initial consult height, weight, and BMI are as follows:    Height: 5' 2\"      2023     9:32 AM    BARIATRIC WEIGHT TRACKING   Initial BMI 46.18       Our weight loss surgery program requires weight loss prior to bariatric surgery and currently the height, weight, and BMI are as follows:    Height: 157.5 cm (5' 2\"), Weight: 100.4 kg (221 lb 5.5 oz), and currently the Body mass index is 40.48 kg/m .    Due to the patient's comorbidity conditions of  CAD s/p NSTEMI, Vfib arrest s/p ICD, HFpEF, pre-diabetes, HTN, HLD, COPD, obesity, h/o blood transfusion, chronic pain with FM/migraines/ OA, GERD, and CKD, in association with elevated body mass index, bariatric surgery has been recommended and is being performed today.    Moreover, as the surgeon performing this procedure, I certify that the following are true in " regards to this patient at or prior to the day of surgery:    1. The patient's body mass index (BMI) is or has been greater than or equal to 35 kg/m2.  2. The patient has at least one co-morbidity related to obesity (as outlined above).  3. The patient has been previously unsuccessful with medical treatment for obesity.  Please note that some of this information has been documented as part of a comprehensive pre-bariatric surgery process in the outpatient clinic and is NOT immediately available in the inpatient encounter for this bariatric operation.      OPERATIVE PROCEDURE:     Shira Best was brought to the operating room and prepared in routine fashion. Under the benefits of general anesthesia, a left upper quadrant Veress needle was inserted and pneumoperitoneum was established using carbon dioxide gas to a maximum pressure of 15 mmHg. A total of five ports were placed into the abdomen.     A liver retractor was placed through the rightmost port and this provided a view of the upper stomach. The operation was started by dividing the short gastric vessels off the greater curvature of the stomach. This dissection was carried up to the angle of His, and ligasure dissector was used for hemostasis.     A bougie (size noted above) was passed into the stomach and I used 2 blue loads and 2 white loads of the [employer] [place] flex linear cutter stapler device to create a vertical sleeve gastrectomy with the bougie as a template. The bougie was removed.    A transabdominal properitoneal anesthetic block was performed using 30 ml 0.5% bupivicaine diluted with 90 ml saline (120 mL total); this was injected using 22gauge spinal needle into the properitoneal planes around the ports and laterally along the anterior axillary line under direct optical guidance. Some of the mixture was used to inject local anesthetic at the skin of each incision as well.    The sleeve gastrectomy specimen (partial gastrectomy) was now  removed from the abdomen through the 15 mm port.    Hemostasis was secured, and the liver retractor was removed.    All ports were then removed from the abdomen under direct visualization.     All needle and sponge counts were correct x2 at the end of the operation, and I was present for all critical components of the procedure.     Skin incisions were closed using skin staples, and sterile dressings were placed.     Gerard Higgins MD  Surgery  [phone] (hospital )  [phone] (clinic nurses)

## 2023-08-29 NOTE — PROGRESS NOTES
"BP (!) 148/76   Pulse 61   Temp 98.9  F (37.2  C) (Oral)   Resp 18   Ht 1.575 m (5' 2\")   Wt 100.4 kg (221 lb 5.5 oz)   SpO2 96%   BMI 40.48 kg/m     Admitted/transferred from: PACU at 1600.  2 RN  skin assessment completed by Crystal Mendieta RN and Navya CLEMONS RN  Skin assessment finding: Lap sites x5 with dressing in place. Left under breast redness. Bruises on left upper lateral chest and left mid abdominal site.   Interventions/actions: early mobility.     Bedside Emergency Equipment Present:  Suction Regulator: yes.  Suction Canister: yes.  Tubing between Regulator and Canister: yes.  O2 Regulator with Tree: Yes.  Ambu Bag: yes.  "

## 2023-08-29 NOTE — ANESTHESIA PROCEDURE NOTES
Airway       Patient location during procedure: OR       Procedure Start/Stop Times: 8/29/2023 8:20 AM  Staff -        Anesthesiologist:  Dragan Blackwood MD       Resident/Fellow: Nova Jacobs MD       Performed By: with residents and resident       Procedure performed by resident/fellow/CRNA in presence of a teaching physician.    Consent for Airway        Urgency: elective  Indications and Patient Condition       Indications for airway management: david-procedural       Induction type:intravenous       Mask difficulty assessment: 1 - vent by mask    Final Airway Details       Final airway type: endotracheal airway       Successful airway: ETT - single and Oral  Endotracheal Airway Details        ETT size (mm): 7.0       Cuffed: yes       Successful intubation technique: direct laryngoscopy and video laryngoscopy       VL Blade Size: MAC 4       Grade View of Cords: 1       Adjucts: stylet       Position: Right       Measured from: gums/teeth       Secured at (cm): 21       Bite block used: None    Post intubation assessment        Placement verified by: capnometry, equal breath sounds and chest rise        Number of attempts at approach: 1       Number of other approaches attempted: 0       Secured with: pink tape       Ease of procedure: easy       Dentition: Intact and Unchanged    Medication(s) Administered   Medication Administration Time: 8/29/2023 8:20 AM

## 2023-08-29 NOTE — ANESTHESIA CARE TRANSFER NOTE
Patient: Shira Best    Procedure: Procedure(s):  GASTRECTOMY, SLEEVE, LAPAROSCOPIC       Diagnosis: Morbid obesity (H) [E66.01]  Diagnosis Additional Information: No value filed.    Anesthesia Type:   General     Note:    Oropharynx: oropharynx clear of all foreign objects and spontaneously breathing  Level of Consciousness: awake  Oxygen Supplementation: face mask  Level of Supplemental Oxygen (L/min / FiO2): 8  Independent Airway: airway patency satisfactory and stable  Dentition: dentition unchanged  Vital Signs Stable: post-procedure vital signs reviewed and stable  Report to RN Given: handoff report given  Patient transferred to: PACU    Handoff Report: Identifed the Patient, Identified the Reponsible Provider, Reviewed the pertinent medical history, Discussed the surgical course, Reviewed Intra-OP anesthesia mangement and issues during anesthesia, Set expectations for post-procedure period and Allowed opportunity for questions and acknowledgement of understanding      Vitals:  Vitals Value Taken Time   BP 96/51 08/29/23 0940   Temp     Pulse 64 08/29/23 0941   Resp     SpO2 100 % 08/29/23 0941   Vitals shown include unvalidated device data.    Electronically Signed By: Nova Jacobs MD  August 29, 2023  9:43 AM

## 2023-08-29 NOTE — PHARMACY-CONSULT NOTE
Bariatric Consult    Medications evaluated as requested per Bariatric Consult. Patient may swallow tablets/capsules ONLY if less than   inch.  Larger tablets/capsules should be broken, crushed or split.    The following changes have been made based on the Bariatric Medication Management Policy:  - Metoprolol: changed XL 25 mg daily to IR 12.5 mg  - Pantoprazole: changed to omeprazole    Consider the following changes:  - Benzonatate capsules: consider holding given these must be swallowed whole and cannot be cut/crushed    The pharmacist will continue to follow as new medications are ordered.    Elba Amato Pharm.D., BCPS

## 2023-08-29 NOTE — PHARMACY-ADMISSION MEDICATION HISTORY
Pharmacist Admission Medication History    Admission medication history is complete. The information provided in this note is only as accurate as the sources available at the time of the update.    Information Source(s):   - See prior pharmacist completed medication history documented by Wilian Thompson on 8/18/2023  - Timing of last doses documented by operating room staff     Elba Amato [facility] 8/29/2023 4:20 PM    Prior to Admission medications    Medication Sig Last Dose Taking? Auth Provider Long Term End Date   acetaminophen (TYLENOL) 325 MG tablet Take 650 mg by mouth every 6 hours as needed for mild pain 8/22/2023 Yes Reported, Patient     albuterol (PROAIR HFA/PROVENTIL HFA/VENTOLIN HFA) 108 (90 Base) MCG/ACT inhaler Inhale 1-2 puffs into the lungs every 4 hours as needed for shortness of breath 8/25/2023 Yes Reported, Patient Yes    benzonatate (TESSALON) 200 MG capsule Take 200 mg by mouth 3 times daily as needed for cough 8/28/2023 Yes Reported, Patient     Budeson-Glycopyrrol-Formoterol (BREZTRI AEROSPHERE) 160-9-4.8 MCG/ACT AERO Inhale 2 puffs into the lungs 2 times daily 8/29/2023 Yes Reported, Patient     busPIRone (BUSPAR) 10 MG tablet Take 10 mg by mouth 3 times daily 8/28/2023 Yes Reported, Patient Yes    butalbital-acetaminophen-caffeine (ESGIC) -40 MG tablet Take 1 tablet by mouth as needed Past Week Yes Reported, Patient     famotidine (PEPCID) 40 MG tablet Take 1 tablet by mouth At Bedtime Past Week Yes Reported, Patient     furosemide (LASIX) 20 MG tablet Take 20 mg by mouth daily Past Week Yes Reported, Patient Yes    gabapentin (NEURONTIN) 300 MG capsule Take 900 mg by mouth 3 times daily 8/28/2023 Yes Reported, Patient Yes    galcanezumab-gnlm (EMGALITY) 120 MG/ML injection  Unknown Yes Reported, Patient     hyoscyamine (LEVSIN) 0.125 MG tablet Take 1 tablet (125 mcg) by mouth every 4 hours as needed for cramping Unknown Yes Gerard Higgins MD     ipratropium - albuterol 0.5  "mg/2.5 mg/3 mL (DUONEB) 0.5-2.5 (3) MG/3ML neb solution Take 1 vial by nebulization every 6 hours as needed for shortness of breath, wheezing or cough Past Week Yes Unknown, Entered By History Yes    JARDIANCE 10 MG TABS tablet TAKE 1 TABLET BY MOUTH ONCE DAILY - DO NOT CHEW OR CRUSH Past Week Yes Reported, Patient     magnesium 250 MG tablet Take 250 mg by mouth Unknown Yes Reported, Patient     metFORMIN (GLUCOPHAGE XR) 500 MG 24 hr tablet Take 500 mg by mouth daily (with breakfast) Past Week Yes Reported, Patient Yes    metoprolol succinate ER (TOPROL XL) 25 MG 24 hr tablet Take 25 mg by mouth daily Original Rx for 37.5 mg (1.5 tablet) once daily, however pt has only been doing 1 tablet daily. Per pt MD aware. Past Week Yes Reported, Patient Yes    nystatin (MYCOSTATIN) 068862 UNIT/GM external powder Apply 60 g topically as needed 8/28/2023 Yes Reported, Patient     oxyCODONE (ROXICODONE) 5 MG tablet Take 5 mg by mouth daily as needed 8/28/2023 Yes Reported, Patient     pantoprazole (PROTONIX) 40 MG EC tablet Take 40 mg by mouth At Bedtime 8/28/2023 Yes Reported, Patient     potassium chloride ER (K-TAB/KLOR-CON) 10 MEQ CR tablet Take 10 mEq by mouth daily 8/28/2023 Yes Reported, Patient     prochlorperazine (COMPAZINE) 5 MG tablet Take 1-2 tablets by mouth at onset of migraine headache, limit 3 times per week. More than a month Yes Reported, Patient     riboflavin 100 MG CAPS Take 1 tablet by mouth 2 times daily More than a month Yes Reported, Patient     rosuvastatin (CRESTOR) 40 MG tablet Take 1 tablet by mouth At Bedtime 8/28/2023 Yes Reported, Patient Yes    senna-docusate (SENOKOT-S/PERICOLACE) 8.6-50 MG tablet Take 2 tablets by mouth daily as needed for constipation (While taking narcotic pain medications.  Stop taking if having loose stools.) More than a month Yes Gerard Higgins MD     sotalol (BETAPACE) 80 MG tablet Take 80 mg by mouth 2 times daily Original Rx was for 1.5 tablet BID but was \"too " "much\" and dropped dose back to 1 tablet twice daily (per pt MD aware). 8/29/2023 at 0430 Yes Reported, Patient     spironolactone (ALDACTONE) 25 MG tablet Take 1 tablet by mouth daily Past Week Yes Reported, Patient Yes    topiramate (TOPAMAX) 100 MG tablet Take 100 mg by mouth At Bedtime 8/29/2023 Yes Reported, Patient Yes    traZODone (DESYREL) 100 MG tablet Take 200 mg by mouth At Bedtime Past Week Yes Unknown, Entered By History Yes    aspirin (ASA) 81 MG chewable tablet Take 81 mg by mouth daily 8/22/2023  Reported, Patient         "

## 2023-08-29 NOTE — ANESTHESIA POSTPROCEDURE EVALUATION
Patient: Shira Best    Procedure: Procedure(s):  GASTRECTOMY, SLEEVE, LAPAROSCOPIC       Anesthesia Type:  General    Note:  Disposition: Outpatient   Postop Pain Control: Uneventful            Sign Out: Well controlled pain   PONV: No   Neuro/Psych: Uneventful            Sign Out: Acceptable/Baseline neuro status   Airway/Respiratory: Uneventful            Sign Out: Acceptable/Baseline resp. status   CV/Hemodynamics: Uneventful            Sign Out: Acceptable CV status   Other NRE: NONE   DID A NON-ROUTINE EVENT OCCUR? No           Last vitals:  Vitals Value Taken Time   BP 76/50 08/29/23 1000   Temp     Pulse 60 08/29/23 1003   Resp     SpO2 100 % 08/29/23 1003   Vitals shown include unvalidated device data.    Electronically Signed By: Dragan Blackwood MD  August 29, 2023  10:04 AM

## 2023-08-30 VITALS
DIASTOLIC BLOOD PRESSURE: 42 MMHG | SYSTOLIC BLOOD PRESSURE: 99 MMHG | HEART RATE: 59 BPM | RESPIRATION RATE: 18 BRPM | OXYGEN SATURATION: 98 % | WEIGHT: 221.34 LBS | HEIGHT: 62 IN | TEMPERATURE: 98.1 F | BODY MASS INDEX: 40.73 KG/M2

## 2023-08-30 PROCEDURE — 250N000013 HC RX MED GY IP 250 OP 250 PS 637: Performed by: SURGERY

## 2023-08-30 PROCEDURE — 250N000011 HC RX IP 250 OP 636: Mod: JZ | Performed by: NURSE PRACTITIONER

## 2023-08-30 PROCEDURE — 250N000013 HC RX MED GY IP 250 OP 250 PS 637: Performed by: NURSE PRACTITIONER

## 2023-08-30 RX ORDER — GABAPENTIN 250 MG/5ML
900 SOLUTION ORAL 3 TIMES DAILY
Qty: 470 ML | Refills: 0 | Status: SHIPPED | OUTPATIENT
Start: 2023-08-30 | End: 2024-10-04

## 2023-08-30 RX ORDER — ONDANSETRON 4 MG/1
4 TABLET, ORALLY DISINTEGRATING ORAL EVERY 8 HOURS PRN
Qty: 15 TABLET | Refills: 0 | Status: SHIPPED | OUTPATIENT
Start: 2023-08-30

## 2023-08-30 RX ORDER — GABAPENTIN 250 MG/5ML
900 SOLUTION ORAL 3 TIMES DAILY
Status: DISCONTINUED | OUTPATIENT
Start: 2023-08-30 | End: 2023-08-30 | Stop reason: HOSPADM

## 2023-08-30 RX ORDER — ASPIRIN 81 MG/1
81 TABLET, CHEWABLE ORAL DAILY
Start: 2023-08-30

## 2023-08-30 RX ORDER — HYOSCYAMINE SULFATE 0.125 MG
0.12 TABLET ORAL EVERY 4 HOURS PRN
Qty: 30 TABLET | Refills: 0 | Status: SHIPPED | OUTPATIENT
Start: 2023-08-30 | End: 2024-01-02

## 2023-08-30 RX ADMIN — SENNOSIDES AND DOCUSATE SODIUM 2 TABLET: 50; 8.6 TABLET ORAL at 08:30

## 2023-08-30 RX ADMIN — ACETAMINOPHEN 650 MG: 325 TABLET, FILM COATED ORAL at 06:28

## 2023-08-30 RX ADMIN — OMEPRAZOLE 20 MG: 20 CAPSULE, DELAYED RELEASE ORAL at 08:29

## 2023-08-30 RX ADMIN — GABAPENTIN 900 MG: 300 CAPSULE ORAL at 08:28

## 2023-08-30 RX ADMIN — OXYCODONE HYDROCHLORIDE 5 MG: 5 TABLET ORAL at 06:28

## 2023-08-30 RX ADMIN — GABAPENTIN 900 MG: 250 SOLUTION ORAL at 09:54

## 2023-08-30 RX ADMIN — FLUTICASONE FUROATE AND VILANTEROL TRIFENATATE 1 PUFF: 200; 25 POWDER RESPIRATORY (INHALATION) at 08:27

## 2023-08-30 RX ADMIN — OXYCODONE HYDROCHLORIDE 5 MG: 5 TABLET ORAL at 11:02

## 2023-08-30 RX ADMIN — UMECLIDINIUM 1 PUFF: 62.5 AEROSOL, POWDER ORAL at 08:27

## 2023-08-30 RX ADMIN — ENOXAPARIN SODIUM 40 MG: 40 INJECTION SUBCUTANEOUS at 09:00

## 2023-08-30 RX ADMIN — SOTALOL HYDROCHLORIDE 80 MG: 80 TABLET ORAL at 08:28

## 2023-08-30 RX ADMIN — BUSPIRONE HYDROCHLORIDE 10 MG: 5 TABLET ORAL at 08:30

## 2023-08-30 ASSESSMENT — ACTIVITIES OF DAILY LIVING (ADL)
ADLS_ACUITY_SCORE: 22

## 2023-08-30 NOTE — DISCHARGE INSTRUCTIONS
For pain management use tylenol and hyoscyamine. You may use your home script of oxycodone for any moderate to severe pain above what is manageable with the tylenol and hyoscyamine. I will put a note in the discharge summary indicating this incase you run out sooner than usual to alert your primary care provider.   Follow up with cardiology ICD clinic for device check   Follow up if you are having lower extremity swelling to discuss restarting lasix and spironolactone  Open gabapentin and omeprazole and mix with applesauce. Let me know if not tolerable and I can send liquid gabapentin.       After Bariatric Surgery Discharge Instructions  [facility] Weight Management     Note: Ask your nurse to order your medications from the pharmacy. Be sure you have your medications with you when you leave.  Diet on discharge: bariatric full liquid.    How much fluid should I drink?  Strive for 48-64 ounces daily.  Carry a water bottle with you without a straw or sports top. Drink from it often.  Keep track of how much fluid you drink in a day.  Remember:  -Do not use straws, chew gum or suck on hard candies. They may cause painful gas.    -Sip, don't slurp when you drink.    -Practice small sips using a medicine cup for the first week postop.   -No ice or cold drinks. This could cause gas or spasms.   -No coffee, soda pop or drinks with caffeine. These may cause stomach pain.   -No alcohol. It is bad for your liver and will cause stomach pain.    How often should I do my deep breathing and coughing?  Use your incentive spirometer (small plastic breathing device) every hour while awake after you get home. Using the incentive spirometer helps you deep breath. Continuing to cough and deep breath will help prevent fevers and pneumonia.   If you do not have a fever after one week, you can stop using the incentive spirometer and discard it.     You can continue to take deep breaths without the incentive spirometer  every one to two hours while awake for the month after surgery.    What kinds of activity can I do?  Get plenty of rest the first few days after surgery and try to balance rest and activity. You will need some time to recover - you may be more tired than you realize at first. You'll feel better and heal faster if you take good care of yourself.  For 4 weeks after surgery (Please review restrictions at your one week visit, they could change based on how well you are doing):  -Don't lift more than 20 pounds.   -Take 4-5 short walks every day.  -Don't jog, run, or do belly exercises.  Don't swim, bathe or use a hot tub until your cuts are healed (scabs are gone).  You may shower  Don't plan to fly or take a road trip within the first 1 to 3 months after surgery.  You could get a blood clot in your legs. If you must travel, get up and move around every hour for at least 5 minutes before continuing on your journey.  Your care team can help you decide when it's safe for you to travel.     What can I do for pain control?  You had major belly surgery that involves all layers of your belly muscles. Pain is expected, even for some as far out as 6-8 weeks after surgery. Moving, sneezing, coughing, and breathing will cause discomfort because these activities use your belly muscles.   Please see your after visit summary medication review for what pain medication will be continued, discontinued and newly started for you.    You can take opioid pain medicine if prescribed and if needed. Try to wean off from it as soon as you feel comfortable.   Do not drive while you are taking opioid pain medicine. This is dangerous.  You can take acetaminophen (Tylenol) between your prescribed pain medicine on a scheduled basis OR take it scheduled every 6 hours (check with your care team for specifics).  -Acetaminophen formulation options:    -Liquid   -Caplet (Cut caplet in half before taking)   -Do not take more than 3000 mg Tylenol in a 24  hour period.  -You may also take Tylenol for pain in place of the opioid pain medicine (check with your care team for specifics).   You can apply ice or heat to the affected area(s). Just remember to wrap the ice in something and limit icing sessions to 20 minutes. Excessive icing can irritate the skin or cause skin damage.  You can apply heat with a hot, wet towel or heating pad. Just like cold therapy, limit heat application to 20 minutes. Never sleep with a heating pad on. It could cause severe burns to your skin.  Wear your binder to support your belly muscles if you have one.  Take this off a little more each day and try to be off completely by 2 weeks after surgery. If you don't need your binder for comfort or support, you don't need to wear it.   You may not be able to sleep in a comfortable position for a few weeks after surgery. This is normal. You may be more comfortable sleeping in a recliner or propped up with 3 pillows for the first couple of weeks after surgery.  Do not take NSAID's (Non-Steroidal Anti-Inflammatory Drugs) (examples: ibuprofen, Motrin, Advil, Aleve, and Naproxen), aspirin, or use pain patches with NSAID's. They will increase your risk of bleeding or getting an ulcer.    Please call the clinic for any of the following pain concerns, we would like to talk to you:  -pain that does not improve with rest  -pain that gets worse and worse  -pain that is not controlled by your pain medicine  -a sudden severe increase in pain    What medications will I need to take after surgery?  You may be discharged with the following types of medications: omeprazole 20 mg once daily for heartburn symptom prevention, zofran as needed for nausea and a medication called hyoscyamine (levsin) as needed for cramping.Please see your after visit summary medication review for what will be continued, discontinued and newly started.  It is important to reduce the amount of acid in your new stomach for a couple of  months after surgery while it is still healing. We will prescribe an acid reducer or antacid. Take it as directed. This will help prevent ulcers, heartburn and acid reflux.  If you took an acid reducer before you had surgery, your care team will let you know what acid reducer you will take after surgery.   It is okay to swallow any medications smaller than   inch in size.   -If it is larger than   inch, it may need to be cut, crushed, or in a liquid form. Check with your care team about which way is most appropriate for you and your medications.    What should I know about my incisions (cuts)?  Your incisions are covered with white steri strips or butterfly tape and have band aids or gauze over the top. If you have gauze or band aids, they can come off in the hospital.  Leave your steri strips on until they fall off on their own. If the steri strips don't fall off after 1 to 2 weeks, you can take them off. If they fall off earlier, replace them with clean band aids trying to avoid touching the incision itself.   If you have gauze covered by a clear dressing, remove 2-3 days after surgery or as directed by your care team.  You may shower in the hospital after surgery and can get your incision coverings wet.  Do not submerge in water (e.g. No baths, swimming pools, hot tubs) until your care team tells you it is okay and your incisions are completely healed.    Call the clinic if you have any of these signs or symptoms of infection:  -Redness around the site.  -Drainage that smells bad.  -Drainage that is thick yellow or green.  -An increase in pain around the incision site  -An increase in swelling around the incision site  -Heat or warmth around incision site   -Fever of 101.5  F (38.3  C) or higher when taken under the tongue.    -Chills    Will my urine or bowel movements change?   Your first bowel movements (stools) will likely be liquid. You may also notice old blood or a darker color (black or maroon color) in  your bowel movements.  This is not unusual and usually goes away after the first week, if not sooner. You may not have a bowel movement for a week.   If you have not had a bowel movement for at least three days after your surgery date and are passing gas, you can use over the counter stool softeners.  Please stop taking the stool softeners and laxatives if your stools are loose.  Increasing fluids and activity as well as getting off narcotics will help prevent constipation.    Call the clinic if:   -You have stomach pain.   -You continue to have constipation.  -You have excessive bloating after walking and passing gas.    How can I prevent dehydration if I feel nauseated (sick to my stomach) and vomit (throw up)?   Vomiting is not normal after surgery. If you continue to have nausea and vomiting, call the clinic.   Nausea can be a sign of dehydration. That is why it is very important to track your fluids.  Do not nap more than one hour during the day. Set a timer to wake yourself up, if needed. Too much sleep will keep you from drinking enough fluid during the day and lead to dehydration.  No outside activity in hot, humid weather until you can drink 48 to 64 ounces of fluid in 24 hours. If you sweat a lot, your body may lose too much water.  Try to take a 1 ounce sip of water (one medicine cup) every 15 minutes.  Set a timer to remind yourself.    Call the clinic if you have any of these signs or symptoms of dehydration:  -Dark colored urine  -Urinating (pass water) less than 2-3 times per day  -Lack of energy  -Nausea  -Dizziness  -Headache    Call the clinic ANY TIME at [phone] if:  -Your pain medicine is not working.  -You have a fever ? 101.5 F.  -You have belly or left shoulder pain that gets worse and worse.  -You have a swollen leg with redness, warmth, or pain behind the knee or calf.  -You have chest pain   -You feel very short of breath.  -You have a sudden severe increase in heart rate.  -You have  "vomiting that gets worse and worse.  -You have constant nausea (feeling sick to your stomach) that does not go away with medication.  -You have trouble swallowing.  -You have an increasing feeling that \"something is not right\".  -You have hiccups that do not stop.  -You have any questions or concerns.    AFTER HOURS QUESTIONS OR CONCERNS: Call [phone] and ask to speak with surgery resident if you are having troubles in the evenings, at night, or on weekends. Please call if you experience increasing abdominal pain, nausea, vomiting, increasing drainage from your wounds, chills, or fever >101.5    If you have to go to the Emergency Room, we prefer you go to the hospital that did your surgery. Please let them know that you had bariatric surgery and to notify your surgeon.    When should I go back to the clinic?  Follow up with your care team in 1-2 weeks.   If this appointment was not already made, please call: [phone]    Appointments located at [facility] clinic:  [facility]    [address]  [place] [address]    "

## 2023-08-30 NOTE — PLAN OF CARE
Goal Outcome Evaluation:Plan of Care Reviewed With: patient Patient reports god pain relief with Oxycodone,Tolerated foods/fluids per orders without nausea,denied cramping feeling.Ambulated delacruz,gait steady.Voiding, positive flatus,no stool.Senna med per orders given.Staples removed,steri strips applied.Reviewed home instructions, has no further questions.Ready for discharge.MS team notified.

## 2023-08-30 NOTE — DISCHARGE SUMMARY
"[facility]   MIS Discharge Summary    Date of Admission: 8/29/2023  Date of Discharge: 8/30/2023    Admission Diagnosis:  1. Morbid Obesity    Discharge Diagnosis:  1. Same as above  2. S/p Laparoscopic sleeve gastrectomy     Consultations:  IP pharmacy    Procedures:  1. Laparoscopic sleeve gastrectomy by Dr Gisela Lainez HPI:  Shira Best is a 58 year old female who is morbidly obese.  Numerous weight loss attempts without surgery have been without success. Due to the patient's comorbidity conditions of  CAD s/p NSTEMI, Vfib arrest s/p ICD, HFpEF, pre-diabetes, HTN, HLD, COPD, obesity, h/o blood transfusion, chronic pain with FM/migraines/ OA, GERD, and CKD, in association with elevated body mass index, bariatric surgery was recommended.     Hospital Course:  The patient was admitted and underwent the above procedure. The patient tolerated the procedure well. There were no complications. The patient's diet was slowly advanced as bowel function returned. Pain was controlled with oral pain medication and the patient was able to ambulate and void without difficulty. The patient received appropriate education post operatively. On POD #1 the patient was discharged to home.    Discharge Physical Exam:  BP 98/54 (BP Location: Left arm)   Pulse 60   Temp 98.1  F (36.7  C) (Oral)   Resp 18   Ht 1.575 m (5' 2\")   Wt 100.4 kg (221 lb 5.5 oz)   SpO2 98%   BMI 40.48 kg/m      Gen: NAD  Lungs: non-labored breathing  CV: regular rhythm, normal rate   Abd: obese, soft, nondistended, appropriately tender, incisions are c/d/i  Ext: no peripheral edema  Neuro: AOx3    Meds:       Review of your medicines        START taking        Dose / Directions   ondansetron 4 MG ODT tab  Commonly known as: ZOFRAN ODT  Used for: S/P laparoscopic sleeve gastrectomy      Dose: 4 mg  Take 1 tablet (4 mg) by mouth every 8 hours as needed for nausea  Quantity: 15 tablet  Refills: 0        "     CONTINUE these medicines which may have CHANGED, or have new prescriptions. If we are uncertain of the size of tablets/capsules you have at home, strength may be listed as something that might have changed.        Dose / Directions   aspirin 81 MG chewable tablet  Commonly known as: ASA  This may have changed: additional instructions      Dose: 81 mg  Take 1 tablet (81 mg) by mouth daily Resume taking on POD 2  Refills: 0     * hyoscyamine 0.125 MG tablet  Commonly known as: LEVSIN  This may have changed: Another medication with the same name was added. Make sure you understand how and when to take each.  Used for: Class 3 severe obesity with serious comorbidity and body mass index (BMI) of 45.0 to 49.9 in adult, unspecified obesity type (H), At high risk for postoperative complications      Dose: 125 mcg  Take 1 tablet (125 mcg) by mouth every 4 hours as needed for cramping  Quantity: 30 tablet  Refills: 1     * hyoscyamine 0.125 MG tablet  Commonly known as: LEVSIN  This may have changed: You were already taking a medication with the same name, and this prescription was added. Make sure you understand how and when to take each.  Used for: S/P laparoscopic sleeve gastrectomy      Dose: 125 mcg  Take 1 tablet (125 mcg) by mouth every 4 hours as needed for cramping  Quantity: 30 tablet  Refills: 0           * This list has 2 medication(s) that are the same as other medications prescribed for you. Read the directions carefully, and ask your doctor or other care provider to review them with you.                CONTINUE these medicines which have NOT CHANGED        Dose / Directions   acetaminophen 325 MG tablet  Commonly known as: TYLENOL      Dose: 650 mg  Take 650 mg by mouth every 6 hours as needed for mild pain  Refills: 0     albuterol 108 (90 Base) MCG/ACT inhaler  Commonly known as: PROAIR HFA/PROVENTIL HFA/VENTOLIN HFA      Dose: 1-2 puff  Inhale 1-2 puffs into the lungs every 4 hours as needed for  shortness of breath  Refills: 0     benzonatate 200 MG capsule  Commonly known as: TESSALON      Dose: 200 mg  Take 200 mg by mouth 3 times daily as needed for cough  Refills: 0     Breztri Aerosphere 160-9-4.8 MCG/ACT Aero inhaler  Generic drug: budeson-glycopyrrol-formoterol      Dose: 2 puff  Inhale 2 puffs into the lungs 2 times daily  Refills: 0     busPIRone 10 MG tablet  Commonly known as: BUSPAR      Dose: 10 mg  Take 10 mg by mouth 3 times daily  Refills: 0     butalbital-acetaminophen-caffeine -40 MG tablet  Commonly known as: ESGIC      Dose: 1 tablet  Take 1 tablet by mouth as needed  Refills: 0     Emgality 120 MG/ML injection  Generic drug: galcanezumab-gnlm      Refills: 0     famotidine 40 MG tablet  Commonly known as: PEPCID      Dose: 1 tablet  Take 1 tablet by mouth At Bedtime  Refills: 0     gabapentin 300 MG capsule  Commonly known as: NEURONTIN      Dose: 900 mg  Take 900 mg by mouth 3 times daily  Refills: 0     ipratropium - albuterol 0.5 mg/2.5 mg/3 mL 0.5-2.5 (3) MG/3ML neb solution  Commonly known as: DUONEB      Dose: 1 vial  Take 1 vial by nebulization every 6 hours as needed for shortness of breath, wheezing or cough  Refills: 0     magnesium 250 MG tablet      Dose: 250 mg  Take 250 mg by mouth  Refills: 0     metoprolol succinate ER 25 MG 24 hr tablet  Commonly known as: TOPROL XL      Dose: 25 mg  Take 25 mg by mouth daily Original Rx for 37.5 mg (1.5 tablet) once daily, however pt has only been doing 1 tablet daily. Per pt MD aware.  Refills: 0     nystatin 919090 UNIT/GM external powder  Commonly known as: MYCOSTATIN      Dose: 60 g  Apply 60 g topically as needed  Refills: 0     pantoprazole 40 MG EC tablet  Commonly known as: PROTONIX      Dose: 40 mg  Take 40 mg by mouth At Bedtime  Refills: 0     potassium chloride ER 10 MEQ CR tablet  Commonly known as: K-TAB/KLOR-CON      Dose: 10 mEq  Take 10 mEq by mouth daily  Refills: 0     prochlorperazine 5 MG tablet  Commonly  "known as: COMPAZINE      Take 1-2 tablets by mouth at onset of migraine headache, limit 3 times per week.  Refills: 0     riboflavin 100 MG Caps      Dose: 1 tablet  Take 1 tablet by mouth 2 times daily  Refills: 0     rosuvastatin 40 MG tablet  Commonly known as: CRESTOR      Dose: 1 tablet  Take 1 tablet by mouth At Bedtime  Refills: 0     senna-docusate 8.6-50 MG tablet  Commonly known as: SENOKOT-S/PERICOLACE  Used for: Class 3 severe obesity with serious comorbidity and body mass index (BMI) of 45.0 to 49.9 in adult, unspecified obesity type (H), At high risk for postoperative complications      Dose: 2 tablet  Take 2 tablets by mouth daily as needed for constipation (While taking narcotic pain medications.  Stop taking if having loose stools.)  Quantity: 30 tablet  Refills: 1     sotalol 80 MG tablet  Commonly known as: BETAPACE      Dose: 80 mg  Take 80 mg by mouth 2 times daily Original Rx was for 1.5 tablet BID but was \"too much\" and dropped dose back to 1 tablet twice daily (per pt MD aware).  Refills: 0     topiramate 100 MG tablet  Commonly known as: TOPAMAX      Dose: 100 mg  Take 100 mg by mouth At Bedtime  Refills: 0     traZODone 100 MG tablet  Commonly known as: DESYREL      Dose: 200 mg  Take 200 mg by mouth At Bedtime  Refills: 0            STOP taking      furosemide 20 MG tablet  Commonly known as: LASIX        Jardiance 10 MG Tabs tablet  Generic drug: empagliflozin        metFORMIN 500 MG 24 hr tablet  Commonly known as: GLUCOPHAGE XR        oxyCODONE 5 MG tablet  Commonly known as: ROXICODONE        spironolactone 25 MG tablet  Commonly known as: ALDACTONE                  Where to get your medicines        These medications were sent to [facility] - [place], [place] - [address]  [address] [facility] [address]      Phone: [phone]   hyoscyamine 0.125 MG tablet  ondansetron 4 MG ODT tab         Additional instructions:  After Care       Future " Labs/Procedures    Activity     Comments:    Your activity upon discharge: activity as tolerated and no lifting of more than 20lb for 4 weeks    Contact Surgeon     Comments:    Contact your Surgeon IF:  ~  Your pain is not controlled by pain medication or pain that suddenly increases;  ~  You develop a fever greater than 101 and/or chills 24 hours after surgery;  ~  You notice redness or bad smelling drainage at the surgery site;  ~  You notice a large amount of bleeding from the surgery site that does not stop when moderate pressure is applied;  ~  You are unable to pass urine within 8-10 hours after surgery;  ~  You have an upset stomach or vomiting lasting more than a day;  ~  You have a skin reaction to tape or dressing such as redness, itching or rash at the surgery site.    Diet     Comments:    Follow this diet upon discharge: bariatric full    Discharge diet     Comments:    Phase II: Full liquid diet, room temperature with one protein shake per day. Goal water intake: 40oz per day by post-operative day #4. Meet with Bariatric Dietitian in 1 week to discuss dietary changes.    Do not drive     Comments:    Do NOT drive until after you have been completely off narcotics for a minimum of 24 hours.    Follow-up Care - Dietician     Comments:    Follow-up with your bariatric dietitian in 1 week.    Return to Work     Comments:    May return to work in 2 weeks if cleared by Bariatric surgeon.    Return to Work     Comments:    May return to work in 2 weeks if cleared by Bariatric surgeon.    Shower/Bathing     Comments:    Okay to shower. Do NOT soak in tub for 2-4 weeks.    Shower/Bathing     Comments:    Okay to shower. Do NOT soak in tub for 2-4 weeks.    Surgical Site Care     Comments:    If you have skin tapes on your surgical site(s), leave them on the surgical site(s) until they fall off on their own or 1 week after surgery date. May remove Band-Aid one day after surgery.    Surgical Site Care      Comments:    If you have skin tapes on your surgical site(s), leave them on the surgical site(s) until they fall off on their own or 1 week after surgery date. May remove Band-Aid one day after surgery.    Wash your hands     Comments:    Wash your hands with soap and warm water before you touch the site of surgery.    Wash your hands     Comments:    Wash your hands with soap and warm water before you touch the site of surgery.    Weight Restrictions     Comments:    Do not lift over 20 pounds for 2 weeks.    Weight Restrictions     Comments:    Do not lift over 20 pounds for 2 weeks.          Patient will use home oxycodone for post op pain management (currently prescribed 7.5mg as needed for pain Q4h). Will likely need refill on current prescription sooner than anticipated.    Will hold diuretics postop and patient will monitor lower extremity edema and wear TEDS at home. Will restart as needed for dependent edema.     Follow Up:  -Follow up with Maryanne Urena NP 9/7/2023     -Will need follow up with [facility] clinic     BARIATRIC PATIENTS:  Call [phone] to schedule or to reach your care team    Maryanne Urena CNP  [facility] [place]

## 2023-08-30 NOTE — PLAN OF CARE
"Goal Outcome Evaluation:   Plan of Care Reviewed With: patient  Overall Patient Progress: improvingOverall Patient Progress: improving  BP (!) 155/78 (BP Location: Left arm)   Pulse 60   Temp 97.7  F (36.5  C) (Axillary)   Resp 16   Ht 1.575 m (5' 2\")   Wt 100.4 kg (221 lb 5.5 oz)   SpO2 92%   BMI 40.48 kg/m     AVSS, sating mid 90s on 1-2L of oxygen via NC.     Neuro: WDL. Fully awake.     GI/: Abdomen soft - tender. No gas or BM yet. Voided x2.     Diet: B- clears and tolerating intake well.     Incisions/Drains: No drains. Has x5 lap sites with dressing on.     IV Access: PIV infusing at 75cc/hr.     Labs: Reviewed.     Activity: OOB with SBA of one. Walked to BR x2. Up in chair x 1.     Pain: Oxycodone 5mg x1. On scheduled Neurontin.     New changes this shift: No changes.     Plan: Continue with post-op cares. Encourage and assist with mobility.          "

## 2023-08-30 NOTE — PLAN OF CARE
"Goal Outcome Evaluation:       A&O x4, CMS intact. VSS on 2 L. Denies shortness of breath or chest pain. Up SBA to bathroom, voiding spontanouelsy w/out difficulty. PIV infusing LR @ 75 mL/hr. 5 abdominal lap sites w/ primapore, CDI. Oxy 5 mg x2 w/ relief. Tolerating Bariatric clears w/ no nausea. Probable discharge 8/30         Problem: Plan of Care - These are the overarching goals to be used throughout the patient stay.    Goal: Plan of Care Review  Description: The Plan of Care Review/Shift note should be completed every shift.  The Outcome Evaluation is a brief statement about your assessment that the patient is improving, declining, or no change.  This information will be displayed automatically on your shift note.  Outcome: Progressing  Flowsheets (Taken 8/30/2023 0122)  Plan of Care Reviewed With: patient  Overall Patient Progress: no change     Problem: Plan of Care - These are the overarching goals to be used throughout the patient stay.    Goal: Patient-Specific Goal (Individualized)  Description: You can add care plan individualizations to a care plan. Examples of Individualization might be:  \"Parent requests to be called daily at 9am for status\", \"I have a hard time hearing out of my right ear\", or \"Do not touch me to wake me up as it startles me\".  Outcome: Progressing     Problem: Pain Acute  Goal: Optimal Pain Control and Function  Outcome: Progressing  Intervention: Develop Pain Management Plan  Recent Flowsheet Documentation  Taken 8/29/2023 2345 by Fercho Alfaro, RN  Pain Management Interventions: medication (see MAR)  Intervention: Prevent or Manage Pain  Recent Flowsheet Documentation  Taken 8/30/2023 0005 by Fercho Alfaro, RN  Medication Review/Management: medications reviewed              "

## 2023-08-31 ENCOUNTER — TELEPHONE (OUTPATIENT)
Dept: ENDOCRINOLOGY | Facility: CLINIC | Age: 58
End: 2023-08-31
Payer: COMMERCIAL

## 2023-08-31 ENCOUNTER — PATIENT OUTREACH (OUTPATIENT)
Dept: ENDOCRINOLOGY | Facility: CLINIC | Age: 58
End: 2023-08-31
Payer: COMMERCIAL

## 2023-08-31 NOTE — TELEPHONE ENCOUNTER
Patient called stating she had 4 missed call but no voicemail from us. No info in chart as to what the calls were regarding.      [phone] okay to leave VM

## 2023-09-06 NOTE — PROGRESS NOTES
RN Post-Op/Post-Discharge Care Coordination Note    Ms. Shira Best is a 58 year old female who underwent laparoscopic gastric sleeve on 8/29 with  Dr. Yuan Higgins.  Spoke with Patient.    Support  Patient able to care for self independently     Health Status  Fevers/chills: Patient denies any fever or chills.    Nausea/Vomiting: Patient denies nausea and vomiting.    Eating/drinking: Tolerating full liquid diet. Reminded to drink small sips slowly.  Encouraged room temperature/warm fluids.    Fluid Ounces per day: at least 48 ounces per day protein 60 g per day    Bowel/Bladder habits: Patient reports having a normal bowel movement. Last bowel movement today.  Urine normal.    Activity: walking    Breathing: Reminded patient to use incentive spirometer- 5 to 10 deep breaths each hour while awake.    Other symptoms: Tachycardia: no, Dizziness/lightheadedness: no, Shortness of breath, dyspnea with exertion, leg pain/swelling: feet are swollen thinks its from the heat wearing TEDS      Drains (TOBY): N/A    Incisions: Patient denies any signs and symptoms of infection..  Notes blisters on skin not on incision will show Maryanne at visit tomorrow Wound closure:  Skin Sealant  Steri-strips    Pain: Rates pain a 5 on a 10 Scale.  Alternating Narcotic Analgesic with Tylenol.  Encouraged non-medication management modalities: Heating pad, with barrier, to abdomen, Frequent position changes, Walking, and Ice packs, with barrier, to incisions    New Medications:  Levsin, Zofran, Oxycodone, Tylenol, Senna, and patient was reminded not to take NSAIDS    Activity/Restrictions  No lifting in excess of 20 pounds for 4 weeks    Pathology reviewed with patient:  No, not yet available    Forms/Letters  Yes. All forms should be faxed to [phone].  Return to Work Date: 9/19/23    Postop Appointment Reminder  September 07 at 2:30 PM confirmed with the patient:  Yes.    Additional Questions: All of her questions were answered  including reviewing diet, restrictions, and wound care.  She will call this office if she has any further questions and/or concerns.        Whom and When to Call  Nurses: [phone]  Fax: [phone]     Patient advised if any concerns outside of clinic hours, to call hospital at [phone] and ask to speak to on-call bariatric resident. They should present to ED at [facility] to be assessed if urgency arises.    Risk for:  urinary tract infection, pneumonia, leg clots (deep vein thrombosis), lung clots (pulmonary emboli), injury to the bowels or other organs, bowel obstruction, hernia, and gastrointestinal bleeding.    Weight loss surgery side effects:  abdominal pain, cramping, bloating, difficulty swallowing, constipation, nausea, vomiting, diarrhea, frequent loose stools, dehydration, hair loss, excess skin, protein, iron and vitamin deficiencies, malnutrition, fatigue, and heartburn.  Bariatric surgery risks may include:  an internal leak at the staple line, narrowing of the esophagus, stomach or intestines (strictures), gallstones, bowel obstruction, inflammation of the esophagus or stomach, ulcers with or without bleeding, injuries to other organs, hernias, and iron deficiency.    Sleeve gastrectomy side effects:  leaks are more common after this procedure.  Risks include:  internal leak at the vertical sleeve staple line; nausea, vomiting, and dehydration for several months; bowel obstruction; gallstones during the first 6 months related to rapid weight loss; decreased absorption of vitamins and protein because of the reduced stomach size; weight regain if you increase food intake; narrowing of stomach, esophagus or intestines (stricture); injury to other organs; hernia; and ulcers.

## 2023-09-07 ENCOUNTER — VIRTUAL VISIT (OUTPATIENT)
Dept: ENDOCRINOLOGY | Facility: CLINIC | Age: 58
End: 2023-09-07
Payer: COMMERCIAL

## 2023-09-07 VITALS — WEIGHT: 222 LBS | HEIGHT: 62 IN | BODY MASS INDEX: 40.85 KG/M2

## 2023-09-07 DIAGNOSIS — E66.01 CLASS 3 SEVERE OBESITY WITH SERIOUS COMORBIDITY AND BODY MASS INDEX (BMI) OF 45.0 TO 49.9 IN ADULT, UNSPECIFIED OBESITY TYPE (H): ICD-10-CM

## 2023-09-07 DIAGNOSIS — E66.813 CLASS 3 SEVERE OBESITY WITH SERIOUS COMORBIDITY AND BODY MASS INDEX (BMI) OF 45.0 TO 49.9 IN ADULT, UNSPECIFIED OBESITY TYPE (H): ICD-10-CM

## 2023-09-07 DIAGNOSIS — J44.9 STAGE 2 MODERATE COPD BY GOLD CLASSIFICATION (H): ICD-10-CM

## 2023-09-07 DIAGNOSIS — Z98.84 S/P LAPAROSCOPIC SLEEVE GASTRECTOMY: Primary | ICD-10-CM

## 2023-09-07 PROCEDURE — 99024 POSTOP FOLLOW-UP VISIT: CPT | Mod: VID | Performed by: NURSE PRACTITIONER

## 2023-09-07 RX ORDER — OXYCODONE HYDROCHLORIDE 5 MG/1
5 TABLET ORAL EVERY 6 HOURS PRN
COMMUNITY
End: 2023-10-19

## 2023-09-07 RX ORDER — SPIRONOLACTONE 25 MG/1
25 TABLET ORAL DAILY
COMMUNITY

## 2023-09-07 RX ORDER — FUROSEMIDE 20 MG
20 TABLET ORAL DAILY
COMMUNITY

## 2023-09-07 RX ORDER — URSODIOL 300 MG/1
300 CAPSULE ORAL 2 TIMES DAILY
Qty: 60 CAPSULE | Refills: 5 | Status: SHIPPED | OUTPATIENT
Start: 2023-09-07 | End: 2024-01-31

## 2023-09-07 NOTE — NURSING NOTE
Is the patient currently in the state of [place]? YES    Visit mode:VIDEO    If the visit is dropped, the patient can be reconnected by: VIDEO VISIT: Text to cell phone:   Telephone Information:   Mobile [phone]       Will anyone else be joining the visit? NO  (If patient encounters technical issues they should call [phone]    How would you like to obtain your AVS? MyChart    Are changes needed to the allergy or medication list? Pt stated no changes to allergies and Pt stated no med changes    Reason for visit: Follow Up    Madison JACOBS

## 2023-09-07 NOTE — PATIENT INSTRUCTIONS
"Robert Silva, it was nice to meet you today!  Thank you for allowing us the privilege of caring for you. We hope we provided you with the excellent service you deserve.   Please let us know if there is anything else we can do for you so that we can be sure you are completely satisfied with your care experience.    To ensure the quality of our services you may be receiving a patient satisfaction survey from an independent patient satisfaction monitoring company.    The greatest compliment you can give is a \"Likely to Recommend\"    Your visit was with Maryanne Urena NP today.    Instructions per today's visit:     Follow up  plan:  Continue pepcid daily   Add omeprazole- open and mix with teaspoon of applesauce  Add urosdiol (gallbladder)- open and mix with pudding/ apple sauce   Great work with hydration  Continue diuretics  Get blood pressure reading   Continue with mindfulness around hunger/ cravings   ___________________________________________________________________________  Important contact and scheduling information:  Please call our contact center at [phone] to schedule your next appointments.  For any nursing questions or concerns call Kymberly Montemayor LPN at [phone] or Simona Maier RN at [phone]  Please call during clinic hours Monday through Friday 8:00a - 4:00p if you have questions or you can contact us via [employer] at anytime and we will reply during clinic hours.    Lab results will be communicated through My Chart or letter (if My Chart not used). Please call the clinic if you have not received communication after 1 week or if you have any questions.?  Clinic Fax: [phone]  __________________________________________________________________________    If labs were ordered today:    Please make an appointment to have them drawn at your convenience.     To schedule the Lab Appointment using [employer]:  Select \"Schedule an Appointment\"  Select \"Lab Only\"  For \"A couple of questions\", select " "\"Other\"  For \"Which locations work for you?, select the location and set up the appointment    To schedule by phone call [phone] to schedule a lab only appointment at any [facility] lab.  ___________________________________________________________________________  Work with A Health !  Virtual Sessions are Available through [facility] Management Clinics    To learn more, call to schedule a free, Health  Q&A appointment: [phone]     What is Health Coaching?  Do you know what you are supposed to do, but you just aren't doing it?  Then, HEALTH COACHING may help you!   Get unstuck and move forward with the support of a professionally trained NBC-HWC (National Board-Certified Health and ) who uses evidence-based approaches to help you move forward with healthy lifestyle changes in the areas of weight loss, stress management and overall well-being.    Health Coaches help you identify goals that will work best for you. Health Coaches provide support and encouragement with overcoming barriers and help you to find inspiration and motivation to lead a healthy lifestyle.    Option one:  Health Coaching 3-Pack; Three, 30-minute Health Coaching Visits, for $99  Visits are done virtually (phone or video)  This is a self pay service; we do not accept insurance for mena coaching.    Option two:   The 24 week Plan; 11 Health Coaching Visits, and a 7 months subscription to [employer]-- on-demand fitness, nutrition and mindfulness classes, for $499 (employee discounts may be available). Participants will also meet regularly with a weight management Medical Provider and a Registered/Licensed Dietician.  This is a self-pay service; we do not accept insurance for health coaching.    To Schedule a free Health  Q&A appointment to learn more,  call [phone].  ____________________________________________________________________    [facility]" [facility]   Healthy Lifestyle Virtual Support Group    Healthy Lifestyle Virtual Support Group?  This is 60 minutes of small group guided discussion, support and resources. All are welcome who want a healthy lifestyle.  WHEN: Starting in July 2023, this group meets the 1st Friday of the month from 12:30 PM - 1:30 PM virtually using Microsoft Teams.    FACILITATOR: Led by National Board Certified Health and , Georgia Cosby [facility].   TO REGISTER: Please send an email to Georgia [email or web address] to receive monthly invites to the group or if you have any questions about having a health .  Prior to the meeting, a link with directions on how to join the meeting will be sent to you.    2023 Meetings  May 19: Let's Talk  June 9: Create Your Coaching Toolkit: Learn How to  Yourself  July 7: Let's Talk  August 4: Benefits of Fiber with DELTA Mckeon  September 1: Show and Tell (share your aps, podcasts, recipes, hacks, books)  October 6 :Let's Talk  November 3: Introduction to Mindfulness   December 1: Let's Talk    If you would like bariatric surgery specific support group info please let your care team know.         Thank you,   [facility] Comprehensive Weight Management Team

## 2023-09-07 NOTE — PROGRESS NOTES
Virtual Visit Details    Type of service:  Video Visit     Originating Location (pt. Location): Home    Distant Location (provider location):  Off-site  Platform used for Video Visit: Toñito

## 2023-09-07 NOTE — PROGRESS NOTES
Video-Visit Details    Type of service:  Video Visit    Video Start Time: 1:59 PM  Video End Time: 2:17 PM    Originating Location (pt. Location): Home    Distant Location (provider location): Offsite (providers home) [facility] [place]     Platform used for Video Visit: Medrio    Nutrition Assessment  Reason For Visit:  Shira Best is a 58 year old female presenting today for nutrition follow-up, 1 week s/p laparoscopic sleeve gastrectomy 8/29/23  with Dr Higgins.     Pt referred by CONCHIS Brandon on December 6, 2022.     Anthropometrics  Initial Consult Weight: 252 lbs  Day of Surgery Weight(8/29/23): 221 lbs  Current Weight: 222 lbs  Weight loss: -30 lbs from initial consult; -0 lbs from day of surgery    Current Vitamins/Minerals: purchased a chewable MVI similar to Flintstones. Iron 18 mg, 90 mcg, 680 mcg Folate.    Nutrition History  Pt reports consuming and tolerating bariatric clear and low-fat full liquid diets. Fluid intake appears adequate, consuming 48-64 oz/day. Protein drinks, yogurt, pudding, jello, soup, popsicles. Pt knows she is getting at least 60 grams of protein each day. Has been getting in at least 45-50 oz fluids each day.     Bowel movements - normal   Physical activity - Has been going for walks     Nutrition Prescription:  Grams Protein: 60 (minimum)  Amount of Fluid: 48-64 oz    Nutrition Diagnosis  Food and nutrition-related knowledge deficit r/t lack of prior exposure to diet advancements beyond bariatric low-fat full liquid diet aeb recent bariatric surgery and pt interest in diet education/review    Intervention  Intervention At Appointment:  Materials/education provided on bariatric pureed and soft diets, protein intake, fluid intake, eating pace, portion control, avoiding excess sugar and fat, recommended vitamin/mineral supplements. Patient demonstrates understanding.     Expected Engagement: Good    Goals:  1) Follow bariatric low-fat  full liquid diet through day 13 post-op, then to progress to pureed diet x 2 weeks(9/12).  If tolerating, may advance on day 29 post-op to bariatric soft diet(9/26).   2) Work towards 60 gm protein/day.  3) Consume 48-64+ oz fluids daily- between meals only once on puree diet  4) Eat slowly (>20 min/meal), chewing well to smooth consistency once on the bariatric soft diet.  5) Limit portions to ~1/4 to 1/2 cup/meal.  6) Start chewable/liquid multivitamin/minerals daily    Post-op Diet Advancement Schedule:  Pureed Diet (stage 3): Start September 12  Soft Diet (stage 4): Start September 26  Regular Diet (stage 5): Start October 24    Post-op Diet Handouts:  Diet Guidelines after Weight-loss Surgery  [email or web address]     Your Stage 1 Diet: Clear Liquids  [email or web address]     Your Stage 2 Diet: Low-fat Full Liquids  [email or web address]     Your Stage 3 Diet: Pureed Foods  [email or web address]     Pureed Recipes  [email or web address]    Your Stage 4 Diet: Soft Foods  [email or web address]    Your Stage 5 Diet: Regular Foods  [email or web address]    Supplements after Sleeve Gastrectomy, Gastric Bypass or Single Anastomosis Duodenal Switch  [email or web address]    Keeping Track of Fluids  [email or web address]    Exercise Guidelines after Weight Loss Surgery (1st 4-6 weeks)  [email or web address]    Follow-Up: October 2 with Melvi.     Time spent with patient: 18 minutes.  Tiffany Suero RD, LD

## 2023-09-07 NOTE — PROGRESS NOTES
"Postoperative bariatric surgery visit.    Patient underwent sleeve gastrectomy 1 week ago.  8/29/2023    Took whole tylenol on accident- no pain or vomiting, didn't get stuck - cutting pills now     Cough- described as rough, using pillow to support cough on abdomen, continues inhalers    Tolerating liquids: 48+oz daily; protein- premade shake and home made shake daily   Hunger/ cravings manageable   Lightheadedness: none  Abdominal pain: resolved - continues oxy for back pain at night if needed   Bowel movements: normal, no issues, off senna   Fevers/shakes/chills: none  GERD: some Taking pepcid daily   Leg/calf pain: none   CP/ SOB- better than baseline - described as mild   Lower extremity edema - increased with increased heat- improved today with cooler weather- restarted furosemide, jardiance and spironolactone. Also started potassium back up - bilateral and equal swelling     Fibromyalgia- oxycodone at baseline   Headaches- emgality stopped preop-   Cardiology follow up     How many opioid pain medications used after surgery?  What did you do with extra pills?  Were any opioid pain medication refills provided after surgery?  Were any opioid pain medications needed after 30 days postop?    Ht 1.575 m (5' 2.01\")   Wt 100.7 kg (222 lb)   BMI 40.59 kg/m     Wt Readings from Last 5 Encounters:   09/07/23 100.7 kg (222 lb)   08/29/23 100.4 kg (221 lb 5.5 oz)   08/08/23 99.8 kg (220 lb)   08/07/23 99.8 kg (220 lb)   08/04/23 99.8 kg (220 lb)      NAD  Overall looks well - speaking in full sentences without any dyspnea   Incisions c/d/i; 2 small blisters on later incision- no longer fluid filled, not open or infected. Non-tender per report     Final Diagnosis   A. PARTIAL GASTRECTOMY:  - Unremarkable gastric wall  - No H. pylori-like organisms identified on routine staining  - No intestinal metaplasia identified       Plan:  1. RD visit today.  2. Start vitamin supplements per RD directions.  3. Advance diet per RD " directions.  4. Follow-up: see me 10/19/2023   5. Actigall prescription - start today   6. B12 SL or injection **  7. Pathology reviewed normal  8. Weight loss medications : topiramate ongoing for migraines, GLP1 stopped preop   9. Need to restart statin? Started     Continue pepcid daily   Add omeprazole- open and mix with teaspoon of applesauce  Add urosdiol (gallbladder)- open and mix with pudding/ apple sauce   Great work with hydration  Continue diuretics  Get blood pressure reading   Continue with mindfulness around hunger/ cravings   Schedule follow up with primary care       BROOKE Walsh [facility] [place]

## 2023-09-07 NOTE — LETTER
"9/7/2023       RE: Shira Best  [address] [facility] [facility] [address]     Dear Colleague,    Thank you for referring your patient, Shira Best, to the [facility] [place] at [facility]. Please see a copy of my visit note below.    Postoperative bariatric surgery visit.    Patient underwent sleeve gastrectomy 1 week ago.  8/29/2023    Took whole tylenol on accident- no pain or vomiting, didn't get stuck - cutting pills now     Cough- described as rough, using pillow to support cough on abdomen, continues inhalers    Tolerating liquids: 48+oz daily; protein- premade shake and home made shake daily   Hunger/ cravings manageable   Lightheadedness: none  Abdominal pain: resolved - continues oxy for back pain at night if needed   Bowel movements: normal, no issues, off senna   Fevers/shakes/chills: none  GERD: some Taking pepcid daily   Leg/calf pain: none   CP/ SOB- better than baseline - described as mild   Lower extremity edema - increased with increased heat- improved today with cooler weather- restarted furosemide, jardiance and spironolactone. Also started potassium back up - bilateral and equal swelling     Fibromyalgia- oxycodone at baseline   Headaches- emgality stopped preop-   Cardiology follow up     How many opioid pain medications used after surgery?  What did you do with extra pills?  Were any opioid pain medication refills provided after surgery?  Were any opioid pain medications needed after 30 days postop?    Ht 1.575 m (5' 2.01\")   Wt 100.7 kg (222 lb)   BMI 40.59 kg/m     Wt Readings from Last 5 Encounters:   09/07/23 100.7 kg (222 lb)   08/29/23 100.4 kg (221 lb 5.5 oz)   08/08/23 99.8 kg (220 lb)   08/07/23 99.8 kg (220 lb)   08/04/23 99.8 kg (220 lb)      NAD  Overall looks well - speaking in full sentences without any dyspnea   Incisions c/d/i; 2 small blisters on later incision- no longer fluid " filled, not open or infected. Non-tender per report     Final Diagnosis   A. PARTIAL GASTRECTOMY:  - Unremarkable gastric wall  - No H. pylori-like organisms identified on routine staining  - No intestinal metaplasia identified       Plan:  1. RD visit today.  2. Start vitamin supplements per RD directions.  3. Advance diet per RD directions.  4. Follow-up: see me 10/19/2023   5. Actigall prescription - start today   6. B12 SL or injection **  7. Pathology reviewed normal  8. Weight loss medications : topiramate ongoing for migraines, GLP1 stopped preop   9. Need to restart statin? Started     Continue pepcid daily   Add omeprazole- open and mix with teaspoon of applesauce  Add urosdiol (gallbladder)- open and mix with pudding/ apple sauce   Great work with hydration  Continue diuretics  Get blood pressure reading   Continue with mindfulness around hunger/ cravings   Schedule follow up with primary care       BROOKE Walsh [facility] [place]     Virtual Visit Details    Type of service:  Video Visit     Originating Location (pt. Location): Home    Distant Location (provider location):  Off-site  Platform used for Video Visit: Toñito

## 2023-09-08 ENCOUNTER — VIRTUAL VISIT (OUTPATIENT)
Dept: ENDOCRINOLOGY | Facility: CLINIC | Age: 58
End: 2023-09-08
Payer: COMMERCIAL

## 2023-09-08 VITALS — HEIGHT: 62 IN | BODY MASS INDEX: 40.85 KG/M2 | WEIGHT: 222 LBS

## 2023-09-08 DIAGNOSIS — Z71.3 NUTRITIONAL COUNSELING: Primary | ICD-10-CM

## 2023-09-08 DIAGNOSIS — Z98.84 S/P LAPAROSCOPIC SLEEVE GASTRECTOMY: ICD-10-CM

## 2023-09-08 PROCEDURE — 99207 PR NO CHARGE LOS: CPT | Mod: VID

## 2023-09-08 PROCEDURE — 97803 MED NUTRITION INDIV SUBSEQ: CPT | Mod: VID

## 2023-09-08 ASSESSMENT — PAIN SCALES - GENERAL: PAINLEVEL: NO PAIN (0)

## 2023-09-08 NOTE — PATIENT INSTRUCTIONS
Robert Silva,     Follow-up with RD on October 2.    Thank you,    Tiffany Suero, RD, LD  If you would like to schedule or reschedule an appointment with the RD, please call [phone]    Nutrition Goals  1) Follow bariatric low-fat full liquid diet through day 13 post-op, then to progress to pureed diet x 2 weeks(9/12).  If tolerating, may advance on day 29 post-op to bariatric soft diet(9/26).   2) Work towards 60 gm protein/day.  3) Consume 48-64+ oz fluids daily- between meals only once on puree diet  4) Eat slowly (>20 min/meal), chewing well to smooth consistency once on the bariatric soft diet.  5) Limit portions to ~1/4 to 1/2 cup/meal.  6) Start chewable/liquid multivitamin/minerals daily    Post-op Diet Advancement Schedule:  Pureed Diet (stage 3): Start September 12  Soft Diet (stage 4): Start September 26  Regular Diet (stage 5): Start October 24    Post-op Diet Handouts:  Diet Guidelines after Weight-loss Surgery  [email or web address]     Your Stage 1 Diet: Clear Liquids  [email or web address]     Your Stage 2 Diet: Low-fat Full Liquids  [email or web address]     Your Stage 3 Diet: Pureed Foods  [email or web address]     Pureed Recipes  [email or web address]    Your Stage 4 Diet: Soft Foods  [email or web address]    Your Stage 5 Diet: Regular Foods  [email or web address]    Supplements after Sleeve Gastrectomy, Gastric Bypass or Single Anastomosis Duodenal Switch  [email or web address]    Keeping Track of Fluids  [email or web address]    Exercise Guidelines after Weight Loss Surgery (1st 4-6 weeks)  [email or web address]      COMPREHENSIVE WEIGHT MANAGEMENT PROGRAM  VIRTUAL SUPPORT GROUPS    For Support Group Information:      We offer support groups for patients who are working on weight loss and considering, preparing for or have had weight loss surgery.   There is no cost for this opportunity.  You are invited  "to attend the?Virtual Support Groups?provided by any of the following locations:    [facility] via [employer] Teams with Gemini Jay RN  2.   [place] via [employer] with Daren Kline, PhD, LP  3.   [place] via [employer] with Georgia Abrams RN  4.   [facility] via [employer] Teams with Georgia Cosby [facility]    The following Support Group information can also be found on our website:  [email or web address]    [email or web address]    [facility] Weight Loss Surgery Support Group    [facility] Weight Loss Surgery Support Group  The support group is a patient-lead forum that meets monthly to share experiences, encouragement and education. It is open to those who have had weight loss surgery, are scheduled for surgery, or are considering surgery.   WHEN: This group meets on the 3rd Wednesday of each month from 5:00PM - 6:00PM virtually using [employer] Teams.   FACILITATOR: Led by Gemini Epperson RD, HETAL, RN, the program's Clinical Coordinator.   TO REGISTER: Please contact the clinic via [employer] or call the nurse line directly at [phone] to inform our staff that you would like an invite sent to you and to let us know the email you would like the invite sent to. Prior to the meeting, a link with directions on how to join the meeting will be sent to you.    2023 Meetings   April 19: Guest Speaker, Orlando Lynne RD, LD, \"Maintaining Weight Loss after Bariatric Surgery\".  May 17: \"Let's Talk\" a time for the group to share.  June 21: \"Let's Talk\" a time for the group to share.  July 19  August 16  September 20  October 18  November 15  December 20    [facility] Support [facility] Bariatric Care Support Group?  This is open to all pre- and post- operative bariatric surgery patients as well " "as their support system.   WHEN: This group meets the 2nd Tuesday of each month from 6:30 PM - 8:00 PM virtually using Microsoft Teams.   FACILITATOR: Led by Daren Kline, Ph.D who is a Licensed Psychologist with the [facility] Comprehensive Weight Management Program.   TO REGISTER: Please send an email to Daren Kline, Ph.D., LP at?[email or web address] you would like an invitation to the group and to learn about using Microsoft Teams.    2023 Meetings  April 11: Guest speaker, Conchita Marrufo MD, Bariatrician, [facility] Comprehensive Weight Management Program, \"Injectable Weight Loss Medications\".  May 9  Ivelisse 13  July 11  August 8  September 12  October 10  November 14  December 12    [facility] Post-Operative Bariatric Surgery Support Group  This is a support group for [facility] bariatric patients (and those external to [facility]) who have had bariatric surgery and are at least 3 months post-surgery.  WHEN: This support group meets the 4th Wednesday of the month from 11:00 AM - 12:00 PM virtually using Microsoft Teams.   FACILITATOR: Led by Certified Bariatric Nurse, Georgia Abrams RN.   TO REGISTER: Please send an email to Georgia at [email or web address] if you would like an invitation to the group and to learn about using Microsoft Teams.    2023 Meetings  April 26  May 24  Ivelisse 28  July 26  August 23  September 27  October 25  November 22  December 27    [facility]   Healthy Lifestyle Virtual Support Group    Healthy Lifestyle Virtual Support Group?  This is 60 minutes of small group guided discussion, support and resources. All are welcome who want a healthy lifestyle.  WHEN: This group meets monthly on a Friday from 12:30 PM - 1:30 PM virtually using Microsoft Teams.  This group will meet the first Friday of the month beginning In July  FACILITATOR: Led by National Board Certified Health and , [facility]. "   TO REGISTER: Please send an email to Georgia [email or web address] to receive monthly invites to the group or if you have any questions about having a health .  Prior to the meeting, a link with directions on how to join the meeting will be sent to you.    2023 Meetings  May 19: Let's Talk  June 9: Create Your Coaching Toolkit: Learn How to  Yourself  July 7: Let's Talk  August 4: Benefits of Fiber with DELTA Mckeon  September 1: Show and Tell (share your aps, podcasts, recipes, hacks, books)  October 6 :Let's Talk  November 3: Introduction to Mindfulness   December 1: Let's Talk

## 2023-09-08 NOTE — NURSING NOTE
Is the patient currently in the state of [place]? NO [place]    Visit mode:VIDEO    If the visit is dropped, the patient can be reconnected by: VIDEO VISIT: Text to cell phone:   Telephone Information:   Mobile [phone]       Will anyone else be joining the visit? NO  (If patient encounters technical issues they should call [phone])    How would you like to obtain your AVS? MyChart    Are changes needed to the allergy or medication list? No    Reason for visit: RECHECK    Tarun JACOBS

## 2023-09-08 NOTE — LETTER
9/8/2023       RE: Shira Best  [address]  [place] [address]     Dear Colleague,    Thank you for referring your patient, Shira Best, to the [facility] WEIGHT MANAGEMENT CLINIC [place] at [facility]. Please see a copy of my visit note below.    Video-Visit Details    Type of service:  Video Visit    Video Start Time: 1:59 PM  Video End Time: 2:17 PM    Originating Location (pt. Location): Home    Distant Location (provider location): Offsite (providers home) [facility] WEIGHT MANAGEMENT CLINIC [place]     Platform used for Video Visit: Girls Guide To    Nutrition Assessment  Reason For Visit:  Shira Best is a 58 year old female presenting today for nutrition follow-up, 1 week s/p laparoscopic sleeve gastrectomy 8/29/23  with Dr Higgins.     Pt referred by CONCHIS Brandon on December 6, 2022.     Anthropometrics  Initial Consult Weight: 252 lbs  Day of Surgery Weight(8/29/23): 221 lbs  Current Weight: 222 lbs  Weight loss: -30 lbs from initial consult; -0 lbs from day of surgery    Current Vitamins/Minerals: purchased a chewable MVI similar to Flintstones. Iron 18 mg, 90 mcg, 680 mcg Folate.    Nutrition History  Pt reports consuming and tolerating bariatric clear and low-fat full liquid diets. Fluid intake appears adequate, consuming 48-64 oz/day. Protein drinks, yogurt, pudding, jello, soup, popsicles. Pt knows she is getting at least 60 grams of protein each day. Has been getting in at least 45-50 oz fluids each day.     Bowel movements - normal   Physical activity - Has been going for walks     Nutrition Prescription:  Grams Protein: 60 (minimum)  Amount of Fluid: 48-64 oz    Nutrition Diagnosis  Food and nutrition-related knowledge deficit r/t lack of prior exposure to diet advancements beyond bariatric low-fat full liquid diet aeb recent bariatric surgery and pt interest in diet  education/review    Intervention  Intervention At Appointment:  Materials/education provided on bariatric pureed and soft diets, protein intake, fluid intake, eating pace, portion control, avoiding excess sugar and fat, recommended vitamin/mineral supplements. Patient demonstrates understanding.     Expected Engagement: Good    Goals:  1) Follow bariatric low-fat full liquid diet through day 13 post-op, then to progress to pureed diet x 2 weeks(9/12).  If tolerating, may advance on day 29 post-op to bariatric soft diet(9/26).   2) Work towards 60 gm protein/day.  3) Consume 48-64+ oz fluids daily- between meals only once on puree diet  4) Eat slowly (>20 min/meal), chewing well to smooth consistency once on the bariatric soft diet.  5) Limit portions to ~1/4 to 1/2 cup/meal.  6) Start chewable/liquid multivitamin/minerals daily    Post-op Diet Advancement Schedule:  Pureed Diet (stage 3): Start September 12  Soft Diet (stage 4): Start September 26  Regular Diet (stage 5): Start October 24    Post-op Diet Handouts:  Diet Guidelines after Weight-loss Surgery  [email or web address]     Your Stage 1 Diet: Clear Liquids  [email or web address]     Your Stage 2 Diet: Low-fat Full Liquids  [email or web address]     Your Stage 3 Diet: Pureed Foods  [email or web address]     Pureed Recipes  [email or web address]    Your Stage 4 Diet: Soft Foods  [email or web address]    Your Stage 5 Diet: Regular Foods  [email or web address]    Supplements after Sleeve Gastrectomy, Gastric Bypass or Single Anastomosis Duodenal Switch  [email or web address]    Keeping Track of Fluids  [email or web address]    Exercise Guidelines after Weight Loss Surgery (1st 4-6 weeks)  [email or web address]    Follow-Up: October 2 with Melvi.     Time spent with patient: 18 minutes.  Tiffany Suero RD, LD

## 2023-10-02 ENCOUNTER — VIRTUAL VISIT (OUTPATIENT)
Dept: ENDOCRINOLOGY | Facility: CLINIC | Age: 58
End: 2023-10-02
Payer: COMMERCIAL

## 2023-10-02 VITALS — BODY MASS INDEX: 37.36 KG/M2 | HEIGHT: 62 IN | WEIGHT: 203 LBS

## 2023-10-02 DIAGNOSIS — Z98.84 S/P LAPAROSCOPIC SLEEVE GASTRECTOMY: ICD-10-CM

## 2023-10-02 DIAGNOSIS — Z71.3 NUTRITIONAL COUNSELING: Primary | ICD-10-CM

## 2023-10-02 DIAGNOSIS — E66.01 CLASS 3 SEVERE OBESITY WITH SERIOUS COMORBIDITY AND BODY MASS INDEX (BMI) OF 45.0 TO 49.9 IN ADULT, UNSPECIFIED OBESITY TYPE (H): ICD-10-CM

## 2023-10-02 DIAGNOSIS — E66.813 CLASS 3 SEVERE OBESITY WITH SERIOUS COMORBIDITY AND BODY MASS INDEX (BMI) OF 45.0 TO 49.9 IN ADULT, UNSPECIFIED OBESITY TYPE (H): ICD-10-CM

## 2023-10-02 PROCEDURE — 99207 PR NO CHARGE LOS: CPT | Mod: VID | Performed by: DIETITIAN, REGISTERED

## 2023-10-02 PROCEDURE — 97803 MED NUTRITION INDIV SUBSEQ: CPT | Mod: VID | Performed by: DIETITIAN, REGISTERED

## 2023-10-02 RX ORDER — GREEN TEA/HOODIA GORDONII 315-12.5MG
1 CAPSULE ORAL DAILY
Qty: 30 TABLET | Refills: 11 | Status: SHIPPED | OUTPATIENT
Start: 2023-10-02

## 2023-10-02 ASSESSMENT — PAIN SCALES - GENERAL: PAINLEVEL: SEVERE PAIN (7)

## 2023-10-02 NOTE — LETTER
"10/2/2023       RE: Shira Best  [address] [place] [address]     Dear Colleague,    Thank you for referring your patient, Shira Best, to the [facility] WEIGHT MANAGEMENT CLINIC [place] at [facility]. Please see a copy of my visit note below.    Video-Visit Details    Type of service:  Video Visit    Video Start Time: 1:30 PM  Video End Time: 1:55 PM    Originating Location (pt. Location): Home    Distant Location (provider location):  Offsite (providers home) [facility] WEIGHT MANAGEMENT CLINIC [place]     Platform used for Video Visit: Miew    Nutrition Reassessment  Reason For Visit:  Shira Best is a 58 year old female presenting today for nutrition follow-up, 1 month s/p laparoscopic sleeve gastrectomy 8/29/23  with Dr Higgins.     Pt referred by CONCHIS Brandon.    Anthropometrics  Initial Consult Weight: 252 lbs  Day of Surgery Weight(8/29/23): 221 lbs  Current Weight:   Estimated body mass index is 37.13 kg/m  as calculated from the following:    Height as of this encounter: 1.575 m (5' 2\").    Weight as of this encounter: 92.1 kg (203 lb).    Weight loss: 49 lbs from initial consult; 18 lbs from day of surgery    Current Vitamins/Minerals: MVI/minerals BID. Has a vitamin D gelcap at home.     Nutrition History:  Stomach feels sore right under breasts and down to the belly button, more so when moving/walking. Started over the weekend. Feels more sore today. Back to work last week - sitting at a desk for long periods. She also reports feeling nauseous and \"icky\" after taking night-time meds. Team notified.     Starting and tolerating bariatric soft solids. Pureed diet went well.   Fluids: Water (20-30 oz/day), diet tea (20 oz/day)   Protein: 60 gm/day  Meals a little more than 1/4 cup    Diet Recall:  Protein shake (Premier protein, 600 mg calcium)   B: banana and greek yogurt   L: taco meat with " cheese, salsa and a few crackers; pb bread   D: taco meat  Snack: 4-5 snacks daily yogurt, sugar-free pudding, SF popcicle/jello     Progress with Previous Goals:  1) Follow bariatric low-fat full liquid diet through day 13 post-op, then to progress to pureed diet x 2 weeks.  If tolerating, may advance on day 29 post-op to bariatric soft diet. Met, continues   2) Work towards 60 gm protein/day. Met, continues   3) Consume 48-64+ oz fluids daily- between meals. Needs improvement, lately has only been reaching 40-50 oz/day.   4) Eat slowly (>20 min/meal), chewing well to smooth consistency once on the bariatric soft diet. Met, continues   5) Limit portions to ~1/2 cup/meal. Met   6) Start chewable/liquid multivitamin/minerals dailyMet     Nutrition Prescription:  Grams Protein: 60 (minimum)   Amount of Fluid: 48-64 oz    Nutrition Diagnosis  Previous: Food and nutrition-related knowledge deficit r/t lack of prior exposure to diet advancements beyond bariatric low-fat full liquid diet aeb recent bariatric surgery and pt interest in diet education/review     Current: Food and nutrition-related knowledge deficit r/t lack of prior exposure to diet advancements beyond bariatric pureed diet aeb recent bariatric surgery and pt interest in diet education/review     Intervention  Materials/Education provided on bariatric soft and regular consistency diets, protein intake, fluid intake, eating pace, chewing foods well, portion control, sugar/fat intake, recommended vitamin/mineral supplements. Advised avoid bread/pasta/rice until after 3 months post-op. Ordered B12 supplement so pt can find the correct dose/form of supplement at pharmacy. States she can find the calcium citrate chews OTC. Patient demonstrates understanding.       Expected Engagement: good    Goals:  1) Follow soft diet for 4 weeks, then to progress to bariatric regular diet(start 10/24).   2) Consume 60+ grams of protein/day.  3) Sip on 64+ oz of fluids/day-  between meals only.   - Avoid snacking between meals. Focus on hydration. May do sugar-free jello/popcicle or protein shake if needed for snack.   4) Eat slowly (>20 min/meal), chewing foods well (to applesauce-like consistency).  5) Limit portions to ~1/2 cup/meal until 3 months post op  6) Get 3 month labs done before next appointments   7) Start sublingual B12 (500 mcg daily)  8) Start chewable calcium citrate (500 mg chew 1-2 times daily)  9) Start 2000 units vitamin D       Post-op Diet Handouts:  Diet Guidelines after Weight-loss Surgery  [email or web address]     Your Stage 4 Diet: Soft Foods  [email or web address]    Your Stage 5 Diet: Regular Foods  [email or web address]    Supplements after Sleeve Gastrectomy, Gastric Bypass or Single Anastomosis Duodenal Switch  [email or web address]    Keeping Track of Fluids  [email or web address]    Exercises after Weight Loss Surgery (strengthening, when no weight lifting restrictions)  [email or web address]         Follow-Up: 3 months post op/prn    Time spent with patient: 25 minutes.  DELTA Hanson

## 2023-10-02 NOTE — PROGRESS NOTES
"Video-Visit Details    Type of service:  Video Visit    Video Start Time: 1:30 PM  Video End Time: 1:55 PM    Originating Location (pt. Location): Home    Distant Location (provider location):  Offsite (providers home) [facility] WE[facility] MANAGEMENT CLIN[facility] [place]     Platform used for Video Visit: To[place]ito    Nutrition Reassessment  Reason For Visit:  Shira Best is a 58 year old female presenting today for nutrition follow-up, 1 month s/p laparoscopic sleeve gastrectomy 8/29/23  with Dr Higgins.     Pt referred by CONCHIS Brandon.    Anthropometrics  Initial Consult Weight: 252 lbs  Day of Surgery Weight(8/29/23): 221 lbs  Current Weight:   Estimated body mass index is 37.13 kg/m  as calculated from the following:    Height as of this encounter: 1.575 m (5' 2\").    Weight as of this encounter: 92.1 kg (203 lb).    Weight loss: 49 lbs from initial consult; 18 lbs from day of surgery    Current Vitamins/Minerals: MVI/minerals BID. Has a vitamin D gelcap at home.     Nutrition History:  Stomach feels sore right under breasts and down to the belly button, more so when moving/walking. Started over the weekend. Feels more sore today. Back to work last week - sitting at a desk for long periods. She also reports feeling nauseous and \"icky\" after taking night-time meds. Team notified.     Starting and tolerating bariatric soft solids. Pureed diet went well.   Fluids: Water (20-30 oz/day), diet tea (20 oz/day)   Protein: 60 gm/day  Meals a little more than 1/4 cup    Diet Recall:  Protein shake (Premier protein, 600 mg calcium)   B: banana and greek yogurt   L: taco meat with cheese, salsa and a few crackers; pb bread   D: taco meat  Snack: 4-5 snacks daily yogurt, sugar-free pudding, SF popcicle/jello     Progress with Previous Goals:  1) Follow bariatric low-fat full liquid diet through day 13 post-op, then to progress to pureed diet x 2 weeks.  If tolerating, may advance on day 29 post-op to bariatric " soft diet. Met, continues   2) Work towards 60 gm protein/day. Met, continues   3) Consume 48-64+ oz fluids daily- between meals. Needs improvement, lately has only been reaching 40-50 oz/day.   4) Eat slowly (>20 min/meal), chewing well to smooth consistency once on the bariatric soft diet. Met, continues   5) Limit portions to ~1/2 cup/meal. Met   6) Start chewable/liquid multivitamin/minerals dailyMet     Nutrition Prescription:  Grams Protein: 60 (minimum)   Amount of Fluid: 48-64 oz    Nutrition Diagnosis  Previous: Food and nutrition-related knowledge deficit r/t lack of prior exposure to diet advancements beyond bariatric low-fat full liquid diet aeb recent bariatric surgery and pt interest in diet education/review     Current: Food and nutrition-related knowledge deficit r/t lack of prior exposure to diet advancements beyond bariatric pureed diet aeb recent bariatric surgery and pt interest in diet education/review     Intervention  Materials/Education provided on bariatric soft and regular consistency diets, protein intake, fluid intake, eating pace, chewing foods well, portion control, sugar/fat intake, recommended vitamin/mineral supplements. Advised avoid bread/pasta/rice until after 3 months post-op. Ordered B12 supplement so pt can find the correct dose/form of supplement at pharmacy. States she can find the calcium citrate chews OTC. Patient demonstrates understanding.       Expected Engagement: good    Goals:  1) Follow soft diet for 4 weeks, then to progress to bariatric regular diet(start 10/24).   2) Consume 60+ grams of protein/day.  3) Sip on 64+ oz of fluids/day- between meals only.   - Avoid snacking between meals. Focus on hydration. May do sugar-free jello/popcicle or protein shake if needed for snack.   4) Eat slowly (>20 min/meal), chewing foods well (to applesauce-like consistency).  5) Limit portions to ~1/2 cup/meal until 3 months post op  6) Get 3 month labs done before next  appointments   7) Start sublingual B12 (500 mcg daily)  8) Start chewable calcium citrate (500 mg chew 1-2 times daily)  9) Start 2000 units vitamin D       Post-op Diet Handouts:  Diet Guidelines after Weight-loss Surgery  [email or web address]     Your Stage 4 Diet: Soft Foods  [email or web address]    Your Stage 5 Diet: Regular Foods  [email or web address]    Supplements after Sleeve Gastrectomy, Gastric Bypass or Single Anastomosis Duodenal Switch  [email or web address]    Keeping Track of Fluids  [email or web address]    Exercises after Weight Loss Surgery (strengthening, when no weight lifting restrictions)  [email or web address]         Follow-Up: 3 months post op/prn    Time spent with patient: 25 minutes.  Melvi Celis RD LD

## 2023-10-02 NOTE — PATIENT INSTRUCTIONS
Robert Silva,    Follow-up with RD on 11/10    Thank you,    Melvi Celis, RD, LD  If you would like to schedule or reschedule an appointment with the RD, please call [phone]    Nutrition Goals  1) Follow soft diet for 4 weeks, then to progress to bariatric regular diet(start 10/24).   2) Consume 60+ grams of protein/day.  3) Sip on 64+ oz of fluids/day- between meals only.   - Avoid snacking between meals. Focus on hydration. May do sugar-free jello/popcicle or protein shake if needed for snack.   4) Eat slowly (>20 min/meal), chewing foods well (to applesauce-like consistency).  5) Limit portions to ~1/2 cup/meal until 3 months post op  6) Get 3 month labs done before next appointments   7) Start sublingual B12 (500 mcg daily)  8) Start chewable calcium citrate (500 mg chew 1-2 times daily)  9) Start 2000 units vitamin D           COMPREHENSIVE WEIGHT MANAGEMENT PROGRAM  VIRTUAL SUPPORT GROUPS    For Support Group Information:      We offer support groups for patients who are working on weight loss and considering, preparing for or have had weight loss surgery.   There is no cost for this opportunity.  You are invited to attend the?Virtual Support Groups?provided by any of the following locations:    [facility] via [employer] Teams with Gemini Jay RN  2.   [place] via [employer] Teams with Daren Kline, PhD, LP  3.   [place] via [employer] Teams with Georgia Abrams RN  4.   [facility] via [employer] Teams with Georgia Cosby, [facility]    The following Support Group information can also be found on our website:  [email or web address]    [email or web address]    [facility] Weight Loss Surgery Support Group    [facility] Weight Loss Surgery Support Group  The support group is a patient-lead forum that meets monthly to share experiences,  "encouragement and education. It is open to those who have had weight loss surgery, are scheduled for surgery, or are considering surgery.   WHEN: This group meets on the 3rd Wednesday of each month from 5:00PM - 6:00PM virtually using [employer] Teams.   FACILITATOR: Led by Gemini Epperson RD, HETAL, RN, the program's Clinical Coordinator.   TO REGISTER: Please contact the clinic via [employer] or call the nurse line directly at [phone] to inform our staff that you would like an invite sent to you and to let us know the email you would like the invite sent to. Prior to the meeting, a link with directions on how to join the meeting will be sent to you.    2023 Meetings April 19: Guest Speaker, Orlando Lynne RD, HETAL, \"Maintaining Weight Loss after Bariatric Surgery\".  May 17: \"Let's Talk\" a time for the group to share.  June 21: \"Let's Talk\" a time for the group to share.  July 19  August 16  September 20  October 18  November 15  December 20    [facility] Support Groups    Connections Bariatric Care Support Group?  This is open to all pre- and post- operative bariatric surgery patients as well as their support system.   WHEN: This group meets the 2nd Tuesday of each month from 6:30 PM - 8:00 PM virtually using Microsoft Teams.   FACILITATOR: Led by Daren Kline, Ph.D who is a Licensed Psychologist with the [facility] Comprehensive Weight Management Program.   TO REGISTER: Please send an email to Daren Kline, Ph.D., LP at?[email or web address]?if you would like an invitation to the group and to learn about using Microsoft Teams.    2023 Meetings April 11: Guest speaker, Conchita Marrufo MD, Bariatrician, [facility] Comprehensive Weight Management Program, \"Injectable Weight Loss Medications\".  May 9  Ivelisse 13  July 11  August 8  September 12  October 10  November 14  December 12    Connections Post-Operative Bariatric Surgery Support Group  This is a support " group for [facility] bariatric patients (and those external to [facility]) who have had bariatric surgery and are at least 3 months post-surgery.  WHEN: This support group meets the 4th Wednesday of the month from 11:00 AM - 12:00 PM virtually using Microsoft Teams.   FACILITATOR: Led by Certified Bariatric Nurse, Georgia Abrams RN.   TO REGISTER: Please send an email to Georgia at [email or web address] if you would like an invitation to the group and to learn about using Microsoft Teams.    2023 Meetings  April 26  May 24  Ivelisse 28  July 26  August 23  September 27  October 25  November 22  December 27    [facility]   Healthy Lifestyle Virtual Support Group    Healthy Lifestyle Virtual Support Group?  This is 60 minutes of small group guided discussion, support and resources. All are welcome who want a healthy lifestyle.  WHEN: This group meets monthly on a Friday from 12:30 PM - 1:30 PM virtually using Microsoft Teams.  This group will meet the first Friday of the month beginning In July  FACILITATOR: Led by National Board Certified Health and , Georgia Cosby [facility].   TO REGISTER: Please send an email to Georgia [email or web address] to receive monthly invites to the group or if you have any questions about having a health .  Prior to the meeting, a link with directions on how to join the meeting will be sent to you.    2023 Meetings  May 19: Let's Talk  June 9: Create Your Coaching Toolkit: Learn How to  Yourself  July 7: Let's Talk  August 4: Benefits of Fiber with DELTA Mckeon  September 1: Show and Tell (share your aps, podcasts, recipes, hacks, books)  October 6 :Let's Talk  November 3: Introduction to Mindfulness   December 1: Let's Talk

## 2023-10-02 NOTE — NURSING NOTE
Is the patient currently in the state of [place]? NO    Visit mode:VIDEO    If the visit is dropped, the patient can be reconnected by: VIDEO VISIT: Text to cell phone:   Telephone Information:   Mobile [phone]       Will anyone else be joining the visit? NO  (If patient encounters technical issues they should call [phone])    How would you like to obtain your AVS? MyChart    Are changes needed to the allergy or medication list? No, Pt stated no changes to allergies, and Pt stated no med changes    Reason for visit: RECHECK ([facility])    Nichole JACOBS    Pt is currently in [place].

## 2023-10-03 ENCOUNTER — TELEPHONE (OUTPATIENT)
Dept: ENDOCRINOLOGY | Facility: CLINIC | Age: 58
End: 2023-10-03
Payer: COMMERCIAL

## 2023-10-03 NOTE — TELEPHONE ENCOUNTER
Left patient message letting her know to start icing incisions at night after work to see if it helps with soreness and to also continue taking Tylenol and Levsin for comfort, also encouraged her to get up and walk during her work day.     Left call back information and will send Mychart message as well.

## 2023-10-05 ENCOUNTER — MYC MEDICAL ADVICE (OUTPATIENT)
Dept: ENDOCRINOLOGY | Facility: CLINIC | Age: 58
End: 2023-10-05
Payer: COMMERCIAL

## 2023-10-05 ENCOUNTER — CARE COORDINATION (OUTPATIENT)
Dept: ENDOCRINOLOGY | Facility: CLINIC | Age: 58
End: 2023-10-05
Payer: COMMERCIAL

## 2023-10-05 DIAGNOSIS — E66.813 CLASS 3 SEVERE OBESITY WITH SERIOUS COMORBIDITY AND BODY MASS INDEX (BMI) OF 45.0 TO 49.9 IN ADULT, UNSPECIFIED OBESITY TYPE (H): ICD-10-CM

## 2023-10-05 DIAGNOSIS — Z91.89 AT HIGH RISK FOR POSTOPERATIVE COMPLICATIONS: ICD-10-CM

## 2023-10-05 DIAGNOSIS — E66.01 CLASS 3 SEVERE OBESITY WITH SERIOUS COMORBIDITY AND BODY MASS INDEX (BMI) OF 45.0 TO 49.9 IN ADULT, UNSPECIFIED OBESITY TYPE (H): ICD-10-CM

## 2023-10-05 NOTE — PROGRESS NOTES
"Patient has been having pain in her abdomen on and off since she got home from the hospital. Pain is \"all over her stomach\". Pain improves with walking.  Has more pain when she is sitting around. No issues with swallowing and tolerating soft diet.  Drinking 30-40 ounces fluids per day. Not keeping very good track. Fluids are room temperature. Taking in at least 60 grams protein per day, drinking Premier protein drinks. Taking levsin twice daily. Working 8 hours per day, works from home, and \"not doing enough walking\"  Last BM 10/5.    Encouraged patient to start the day by drinking something warm like chicken broth. To be up walking every 30 minutes and sipping fluids all day long. To just stick to clears and protein drinks for now until fluid goals are met. Take Levsin every 4 hours around the clock and Tylenol every 6 hours. Start gas X daily and as needed. Restart Senna daily. Keep strict fluid log through the weekend and update us on her status on Monday.    If feeling dizzy, lightheaded, having dark urine or feeling dehydrated, go to the ER.     Patient verbalized understanding.   "

## 2023-10-09 RX ORDER — HYOSCYAMINE SULFATE 0.125 MG
0.12 TABLET ORAL EVERY 4 HOURS PRN
Qty: 30 TABLET | Refills: 1 | Status: SHIPPED | OUTPATIENT
Start: 2023-10-09 | End: 2024-01-02

## 2023-10-19 ENCOUNTER — VIRTUAL VISIT (OUTPATIENT)
Dept: ENDOCRINOLOGY | Facility: CLINIC | Age: 58
End: 2023-10-19
Payer: COMMERCIAL

## 2023-10-19 VITALS — HEIGHT: 62 IN | BODY MASS INDEX: 37.36 KG/M2 | WEIGHT: 203 LBS

## 2023-10-19 DIAGNOSIS — Z98.84 S/P LAPAROSCOPIC SLEEVE GASTRECTOMY: ICD-10-CM

## 2023-10-19 DIAGNOSIS — E66.813 CLASS 3 SEVERE OBESITY WITH SERIOUS COMORBIDITY AND BODY MASS INDEX (BMI) OF 45.0 TO 49.9 IN ADULT, UNSPECIFIED OBESITY TYPE (H): Primary | ICD-10-CM

## 2023-10-19 DIAGNOSIS — E66.01 CLASS 3 SEVERE OBESITY WITH SERIOUS COMORBIDITY AND BODY MASS INDEX (BMI) OF 45.0 TO 49.9 IN ADULT, UNSPECIFIED OBESITY TYPE (H): Primary | ICD-10-CM

## 2023-10-19 PROCEDURE — 99024 POSTOP FOLLOW-UP VISIT: CPT | Mod: VID | Performed by: NURSE PRACTITIONER

## 2023-10-19 ASSESSMENT — PAIN SCALES - GENERAL: PAINLEVEL: SEVERE PAIN (6)

## 2023-10-19 NOTE — PATIENT INSTRUCTIONS
"Robert Silva, it was nice to meet you today!  Thank you for allowing us the privilege of caring for you. We hope we provided you with the excellent service you deserve.   Please let us know if there is anything else we can do for you so that we can be sure you are completely satisfied with your care experience.    To ensure the quality of our services you may be receiving a patient satisfaction survey from an independent patient satisfaction monitoring company.    The greatest compliment you can give is a \"Likely to Recommend\"    Your visit was with Maryanne Urena NP today.    Instructions per today's visit:     Follow up  plan:  No further activity restrictions. Physical therapy is a great idea!   Continue pepcid  Eat protein first, then veggies, then carbs  Try eating protein rather than a shake at lunch   Great work with fluid schedule! Keep it up!       ___________________________________________________________________________  Important contact and scheduling information:  Please call our contact center at [phone] to schedule your next appointments.  For any nursing questions or concerns call Kymberly Montemayor LPN at [phone] or Simona Maier RN at [phone]  Please call during clinic hours Monday through Friday 8:00a - 4:00p if you have questions or you can contact us via [employer] at anytime and we will reply during clinic hours.    Lab results will be communicated through My Chart or letter (if My Chart not used). Please call the clinic if you have not received communication after 1 week or if you have any questions.?  Clinic Fax: [phone]  __________________________________________________________________________    If labs were ordered today:    Please make an appointment to have them drawn at your convenience.     To schedule the Lab Appointment using [employer]):  Select \"Schedule an Appointment\"  Select \"Lab Only\"  For \"A couple of questions\", select \"Other\"  For \"Which locations work for you?, select " the location and set up the appointment    To schedule by phone call [phone] to schedule a lab only appointment at any [facility] lab.  ___________________________________________________________________________  Work with A Health !  Virtual Sessions are Available through [facility] Weight Management Clinics    To learn more, call to schedule a free, Health  Q&A appointment: [phone]     What is Health Coaching?  Do you know what you are supposed to do, but you just aren't doing it?  Then, HEALTH COACHING may help you!   Get unstuck and move forward with the support of a professionally trained NBC-HWC (National Board-Certified Health and ) who uses evidence-based approaches to help you move forward with healthy lifestyle changes in the areas of weight loss, stress management and overall well-being.    Health Coaches help you identify goals that will work best for you. Health Coaches provide support and encouragement with overcoming barriers and help you to find inspiration and motivation to lead a healthy lifestyle.    Option one:  Health Coaching 3-Pack; Three, 30-minute Health Coaching Visits, for $99  Visits are done virtually (phone or video)  This is a self pay service; we do not accept insurance for mena coaching.    Option two:   The 24 week Plan; 11 Health Coaching Visits, and a 7 months subscription to [employer]-- on-demand fitness, nutrition and mindfulness classes, for $499 (employee discounts may be available). Participants will also meet regularly with a weight management Medical Provider and a Registered/Licensed Dietician.  This is a self-pay service; we do not accept insurance for health coaching.    To Schedule a free Health  Q&A appointment to learn more,  call [phone].  ____________________________________________________________________  [facility]  Healthy Lifestyle Group    Healthy Lifestyle  "Group  This is a 60 minute virtual coaching group for those who want to lead a healthier lifestyle. Come together to set goals and overcome barriers in a supportive group environment. We will address the four pillars of health--nutrition, exercise, sleep and emotional well-being.  This group is highly recommended for those who are participating in the 24 week Healthy Lifestyle Plan and our Health Coaching sessions.    WHEN: This group meets the first Friday of the month, 12:30 PM - 1:30 PM online, via a zoom meeting.      FACILITATOR: Led by National Board Certified Health and , Georgia Cosby, [facility].    TO REGISTER: Please call the Call Center at [phone] to register. You will get an appointment to attend in [place]. Fifteen minutes prior to the meeting, complete the e-check in and you will get the link to join the meeting.  There is no charge to attend this group and space is limited.      2023 and 2024 Meeting Topics and Dates:    November 3: Introduction to Mindfulness (Learn simple and effective mindfulness practices and how it can benefit you)    December 8: Let's Talk (guided discussion on our wins and challenges)    January 5: New Years Vision: Manifest your Best 2024! (Guided imagery,  journaling and discussion)    February 2: Let's Talk    March 1: 10 Percent Happier by Yuan Jett (Book Bites; a guided discussion on the nuggets of wisdom from favorite wellness books; no need to read the book but highly encouraged)    April 5: Let's Talk    May 3: \"Essentialism; The Disciplined Pursuit of Less by Vlad Hallman (book bites discussion)    June 7: Let's Talk    July 5: NO MEETING, off for the 4th of July Holiday    August 2: The Blue Zones, Secrets for Living a Longer Life by Yuan Nuñez (book bites discussion)      If you would like bariatric surgery specific support group info please let your care team know.         Thank you,   [facility] Comprehensive Weight Management " Team

## 2023-10-19 NOTE — LETTER
"10/19/2023       RE: Shira Best  [address] [address]     Dear Colleague,    Thank you for referring your patient, Shira Best, to the [facility] [place] [facility]. Please see a copy of my visit note below.    Postoperative bariatric surgery visit.    Patient underwent sleeve gastrectomy 7 weeks ago.  8/29/2023    Tolerating liquids: improving- has gotten on a schedule while working   Lightheadedness: none  Abdominal pain: improving, continues to have some pain   Bowel movements: no constipation or diarrhea   Fevers/shakes/chills: none  GERD: none Taking pepcid daily   Leg/calf pain: none    Gets really full with meat. Just doesn't want to eat meat- doesn't sound good. If doesn't eat enough, feels she wakes up in the middle of the night and be too hungry to sleep. Feels hungry during the day as well but will eat when hungry. Tries to not eat over night but difficult when hungry   -2 protein shakes daily   -tries to do a meal at dinner but then feels too full too quickly - meat/veggies/potato - getting at least 1/4 a cup maybe a little more   -snack earlier in the day - fruit     Blood sugars: stable - no hypoglycemia or hyperglycemia   -occasionally feeling \"icky\" during the day but improved with eating     How many opioid pain medications used after surgery?  What did you do with extra pills?  Were any opioid pain medication refills provided after surgery?  Were any opioid pain medications needed after 30 days postop?    Ht 1.575 m (5' 2.01\")   Wt 92.1 kg (203 lb)   BMI 37.12 kg/m     Wt Readings from Last 5 Encounters:   10/19/23 92.1 kg (203 lb)   10/02/23 92.1 kg (203 lb)   09/08/23 100.7 kg (222 lb)   09/07/23 100.7 kg (222 lb)   08/29/23 100.4 kg (221 lb 5.5 oz)      NAD  Overall looks well   Incisions c/d/i; non-tender per report     Plan:  1. RD visit today.  2. Start vitamin " supplements per RD directions.  3. Advance diet per RD directions.  4. Follow-up: 2 months- labs at that time   5. Actigall prescription- ordered 1 week post op- taking this   6. B12 SL   7. Pathology reviewed normal  8. Weight loss medications - GLP1 preop stopped, cont topiramate for migraines   9. Need to restart statin- started postop     No activity restrictions- advance as tolerated     Maryanne Urena CNP  [facility] WEIGHT MANAGEMENT [facility]IN[facility] [place]     Fax labs to [facility]     Virtual Visit Details    Type of service:  Video Visit     Originating Location (pt. Location): Home    Distant Location (provider location):  Off-site  Platform used for Video Visit: [employer]      Again, thank you for allowing me to participate in the care of your patient.      Sincerely,    Maryanne Urena NP

## 2023-10-19 NOTE — NURSING NOTE
Is the patient currently in the state of [place]? YES    Visit mode:VIDEO    If the visit is dropped, the patient can be reconnected by: VIDEO VISIT: Text to cell phone:   Telephone Information:   Mobile [phone]       Will anyone else be joining the visit? NO  (If patient encounters technical issues they should call [phone]    How would you like to obtain your AVS? MyChart    Are changes needed to the allergy or medication list? Pt stated no changes to allergies and Pt stated no med changes    Reason for visit: Follow Up    [place]ison JACOBS

## 2023-10-19 NOTE — PROGRESS NOTES
"Postoperative bariatric surgery visit.    Patient underwent sleeve gastrectomy 7 weeks ago.  8/29/2023    Tolerating liquids: improving- has gotten on a schedule while working   Lightheadedness: none  Abdominal pain: improving, continues to have some pain   Bowel movements: no constipation or diarrhea   Fevers/shakes/chills: none  GERD: none Taking pepcid daily   Leg/calf pain: none    Gets really full with meat. Just doesn't want to eat meat- doesn't sound good. If doesn't eat enough, feels she wakes up in the middle of the night and be too hungry to sleep. Feels hungry during the day as well but will eat when hungry. Tries to not eat over night but difficult when hungry   -2 protein shakes daily   -tries to do a meal at dinner but then feels too full too quickly - meat/veggies/potato - getting at least 1/4 a cup maybe a little more   -snack earlier in the day - fruit     Blood sugars: stable - no hypoglycemia or hyperglycemia   -occasionally feeling \"icky\" during the day but improved with eating     How many opioid pain medications used after surgery?  What did you do with extra pills?  Were any opioid pain medication refills provided after surgery?  Were any opioid pain medications needed after 30 days postop?    Ht 1.575 m (5' 2.01\")   Wt 92.1 kg (203 lb)   BMI 37.12 kg/m     Wt Readings from Last 5 Encounters:   10/19/23 92.1 kg (203 lb)   10/02/23 92.1 kg (203 lb)   09/08/23 100.7 kg (222 lb)   09/07/23 100.7 kg (222 lb)   08/29/23 100.4 kg (221 lb 5.5 oz)      NAD  Overall looks well   Incisions c/d/i; non-tender per report     Plan:  1. RD visit today.  2. Start vitamin supplements per RD directions.  3. Advance diet per RD directions.  4. Follow-up: 2 months- labs at that time   5. Actigall prescription- ordered 1 week post op- taking this   6. B12 SL   7. Pathology reviewed normal  8. Weight loss medications - GLP1 preop stopped, cont topiramate for migraines   9. Need to restart statin- started postop "     No activity restrictions- advance as tolerated     Maryanne Urena CNP  [facility] WEIGHT MANAGEMENT CLINIC [place]     Fax labs to [facility] in [facility]     Virtual Visit Details    Type of service:  Video Visit     Originating Location (pt. Location): Home    Distant Location (provider location):  Off-site  Platform used for Video Visit: [employer]

## 2023-10-26 ENCOUNTER — DOCUMENTATION ONLY (OUTPATIENT)
Dept: ENDOCRINOLOGY | Facility: CLINIC | Age: 58
End: 2023-10-26
Payer: COMMERCIAL

## 2023-10-26 NOTE — PROGRESS NOTES
Faxed lab orders via [employer] to [facility] in [facility] at [phone].

## 2023-11-09 NOTE — PROGRESS NOTES
"Video-Visit Details    Type of service:  Video Visit    Video Start Time: 2:30 PM  Video End Time: 2:43PM    Originating Location (pt. Location): Home    Distant Location (provider location):  Offsite (providers home) [facility] [place]     Platform used for Video Visit: Well    Nutrition Reassessment  Reason For Visit:  Shira Best is a 58 year old female presenting today for nutrition follow-up, 3 month s/p laparoscopic sleeve gastrectomy 8/29/23  with Dr Higgins.     Pt referred by CONCHIS Brandon.    Anthropometrics  Initial Consult Weight: 252 lbs  Day of Surgery Weight(8/29/23): 221 lbs  Current Weight:   Estimated body mass index is 35.47 kg/m  as calculated from the following:    Height as of this encounter: 1.575 m (5' 2.01\").    Weight as of this encounter: 88 kg (194 lb).    Weight loss: -58 lbs from initial consult; -27 lbs from day of surgery     Get 3 month labs done that were faxed to [facility], plans to do in December 18.     Current Vitamins/Minerals: MVI/minerals BID. Has a vitamin D gelcap at home, Vitamin B12 500 mcg, Vitamin C    Nutrition History:  Stomach feels sore right under breasts and down to the belly button, more so when moving/walking. Started over the weekend. Feels more sore today. Back to work last week - sitting at a desk for long periods. She also reports feeling nauseous and \"icky\" after taking night-time meds. Team notified.     Starting and tolerating bariatric soft solids. Pureed diet went well.   Fluids: Water (20-30 oz/day), diet tea (20 oz/day)   Protein: 60 gm/day  Meals a little more than 1/4 cup    Diet Recall:  Protein shake (Premier protein, 600 mg calcium)   B: banana and greek yogurt   L: taco meat with cheese, salsa and a few crackers; pb bread   D: taco meat  Snack: 4-5 snacks daily yogurt, sugar-free pudding, SF popcicle/jello     November 2023: Eating is going ok on the regular diet. Gets full really fast, " can't have a lot to eat. Portions tend to be around 1/2 cup of food at a meal. Eats protein first. Drinking liquids, still incorporating 2 protein shakes per day. Potatoes have been a little harder to tolerate. Eats slowly at meals. Likes fruit most, sometimes meals are harder to eat.     Bowel movements are daily.     Progress with Previous Goals:  1) Continue bariatric regular diet, as tolerated - met, continues  2) Consume 60+ grams of protein/day. - met, continues  3) Sip on 48-64+ oz of fluids/day- between meals only. -met, continues  4) Eat slowly (>20 min/meal), chewing foods well (to applesauce-like consistency). -met, continues  5) Limit portions to ~1/2 until 3 months post op  - met  6) Take the following after a Sleeve Gastrectomy: - met  - Multivitamin/minerals: adult dose 1-2 times daily  Ideally want one that provides:   5,000 - 10,000 international units vitamin A daily   800 mg oral folate daily  8 - 22 mg zinc and 1 - 2 mg copper daily  12 mg Thiamine  - Iron: 45-60 mg elemental (18-36 mg if low risk) - may partly or fully be covered in multivitamin   - Calcium Citrate containing vitamin D: 500 mg 3 times daily or 600 mg 2 times daily     - Separate the calcium from your multivitamin or iron by at least 2 hours.     - Must be a chewable calcium citrate until post-op 3 months     - Options for calcium citrate: Galo calcium citrate chewable, bariatric advantage calcium citrate chewable, Celebrate vitamins calcium citrate chewable, Bariatric Fusion calcium citrate chewable  - Vitamin D - at least 3,000 international units/day between all supplements  - Vitamin B12: sublingual form of at least 500 mcg daily or injection of 1000 mcg monthly     Nutrition Prescription:  Grams Protein: 60 (minimum)   Amount of Fluid: 48-64 oz    Nutrition Diagnosis  Previous: Food and nutrition-related knowledge deficit r/t lack of prior exposure to diet advancements beyond bariatric pureed diet aeb recent bariatric surgery  and pt interest in diet education/review    Current: Food and nutrition-related knowledge deficit r/t lack of prior exposure to diet instruction beyond 3 months s/p SG as evidenced by recent bariatric surgery and pt interest in diet education/review    Intervention  Materials/Education provided, reviewed bariatric regular consistency diet, protein intake, fluid intake, eating pace, chewing foods well, portion control, sugar/fat intake, recommended vitamin/mineral supplements. Patient demonstrates understanding.     Expected Engagement: good    Goals:  1) Follow bariatric regular diet.   2) Consume 60+ grams of protein/day.  3) Sip on 48-64+ oz of fluids/day- between meals only.  4) Eat slowly (>20 min/meal), chewing foods well (to applesauce-like consistency).  5) Limit portions to ~1/2 - 3/4 cup/meal until 6 months post op.  6) Take the following after a Sleeve Gastrectomy:  - Multivitamin/minerals: adult dose 1-2 times daily  Ideally want one that provides:   5,000 - 10,000 international units vitamin A daily   800 mg oral folate daily  8 - 22 mg zinc and 1 - 2 mg copper daily  12 mg Thiamine  - Iron: 45-60 mg elemental (18-36 mg if low risk) - may partly or fully be covered in multivitamin   - Calcium Citrate containing vitamin D: 500 mg 3 times daily or 600 mg 2 times daily     - Separate the calcium from your multivitamin or iron by at least 2 hours.     - Must be a chewable calcium citrate until post-op 3 months     - Options for calcium citrate: Galo calcium citrate chewable, bariatric advantage calcium citrate chewable, Celebrate vitamins calcium citrate chewable, Bariatric Fusion calcium citrate chewable  - Vitamin D - at least 3,000 international units/day between all supplements  - Vitamin B12: sublingual form of at least 500 mcg daily or injection of 1000 mcg monthly     Post-op Diet Handouts:  Diet Guidelines after Weight-loss Surgery  [email or web address]."[email or web address]     Your Stage 4 Diet: Soft  Foods  [email or web address]    Your Stage 5 Diet: Regular Foods  [email or web address]    Supplements after Sleeve Gastrectomy, Gastric Bypass or Single Anastomosis Duodenal Switch  [email or web address]    Keeping Track of Fluids  [email or web address]    Exercises after Weight Loss Surgery (strengthening, when no weight lifting restrictions)  [email or web address]       Follow-Up: January 4.    Time spent with patient: 13 minutes.  Tiffany Suero RD, LD  Clinic # [phone]

## 2023-11-10 ENCOUNTER — VIRTUAL VISIT (OUTPATIENT)
Dept: ENDOCRINOLOGY | Facility: CLINIC | Age: 58
End: 2023-11-10
Payer: COMMERCIAL

## 2023-11-10 VITALS — BODY MASS INDEX: 35.7 KG/M2 | HEIGHT: 62 IN | WEIGHT: 194 LBS

## 2023-11-10 DIAGNOSIS — Z71.3 NUTRITIONAL COUNSELING: Primary | ICD-10-CM

## 2023-11-10 DIAGNOSIS — Z98.84 S/P LAPAROSCOPIC SLEEVE GASTRECTOMY: ICD-10-CM

## 2023-11-10 PROCEDURE — 99207 PR NO CHARGE LOS: CPT | Mod: VID

## 2023-11-10 PROCEDURE — 97803 MED NUTRITION INDIV SUBSEQ: CPT | Mod: VID

## 2023-11-10 ASSESSMENT — PAIN SCALES - GENERAL: PAINLEVEL: SEVERE PAIN (6)

## 2023-11-10 NOTE — LETTER
"11/10/2023       RE: Shira Best  [address] [address]     Dear Colleague,    Thank you for referring your patient, Shira Best, to the [facility] [place] at [facility]. Please see a copy of my visit note below.    Video-Visit Details    Type of service:  Video Visit    Video Start Time: 1:30 PM  Video End Time: 1:55 PM    Originating Location (pt. Location): Home    Distant Location (provider location):  Offsite (providers home) [facility] [place]     Platform used for Video Visit: [employer]    Nutrition Reassessment  Reason For Visit:  Shira Best is a 58 year old female presenting today for nutrition follow-up, 3 month s/p laparoscopic sleeve gastrectomy 8/29/23  with Dr Higgins.     Pt referred by CONCHIS Brandon.    Anthropometrics  Initial Consult Weight: 252 lbs  Day of Surgery Weight(8/29/23): 221 lbs  Current Weight:   Estimated body mass index is 35.47 kg/m  as calculated from the following:    Height as of this encounter: 1.575 m (5' 2.01\").    Weight as of this encounter: 88 kg (194 lb).    Weight loss: -58 lbs from initial consult; -27 lbs from day of surgery     Get 3 month labs done that were faxed to [email or web address], plans to do in December 18.     Current Vitamins/Minerals: MVI/minerals BID. Has a vitamin D gelcap at home, Vitamin B12 500 mcg, Vitamin C    Nutrition History:  Stomach feels sore right under breasts and down to the belly button, more so when moving/walking. Started over the weekend. Feels more sore today. Back to work last week - sitting at a desk for long periods. She also reports feeling nauseous and \"icky\" after taking night-time meds. Team notified.     Starting and tolerating bariatric soft solids. Pureed diet went well.   Fluids: Water (20-30 oz/day), diet tea (20 oz/day)   Protein: 60 gm/day  Meals a little more than " 1/4 cup    Diet Recall:  Protein shake (Premier protein, 600 mg calcium)   B: banana and greek yogurt   L: taco meat with cheese, salsa and a few crackers; pb bread   D: taco meat  Snack: 4-5 snacks daily yogurt, sugar-free pudding, SF popcicle/jello     November 2023: Eating is going ok on the regular diet. Gets full really fast, can't have a lot to eat. Portions tend to be around 1/2 cup of food at a meal. Eats protein first. Drinking liquids, still incorporating 2 protein shakes per day. Potatoes have been a little harder to tolerate. Eats slowly at meals. Likes fruit most, sometimes meals are harder to eat.     Bowel movements are daily.     Progress with Previous Goals:  1) Continue bariatric regular diet, as tolerated - met, continues  2) Consume 60+ grams of protein/day. - met, continues  3) Sip on 48-64+ oz of fluids/day- between meals only. -met, continues  4) Eat slowly (>20 min/meal), chewing foods well (to applesauce-like consistency). -met, continues  5) Limit portions to ~1/2 until 3 months post op  - met  6) Take the following after a Sleeve Gastrectomy: - met  - Multivitamin/minerals: adult dose 1-2 times daily  Ideally want one that provides:   5,000 - 10,000 international units vitamin A daily   800 mg oral folate daily  8 - 22 mg zinc and 1 - 2 mg copper daily  12 mg Thiamine  - Iron: 45-60 mg elemental (18-36 mg if low risk) - may partly or fully be covered in multivitamin   - Calcium Citrate containing vitamin D: 500 mg 3 times daily or 600 mg 2 times daily     - Separate the calcium from your multivitamin or iron by at least 2 hours.     - Must be a chewable calcium citrate until post-op 3 months     - Options for calcium citrate: Galo calcium citrate chewable, bariatric advantage calcium citrate chewable, Celebrate vitamins calcium citrate chewable, Bariatric Fusion calcium citrate chewable  - Vitamin D - at least 3,000 international units/day between all supplements  - Vitamin B12: sublingual  form of at least 500 mcg daily or injection of 1000 mcg monthly     Nutrition Prescription:  Grams Protein: 60 (minimum)   Amount of Fluid: 48-64 oz    Nutrition Diagnosis  Previous: Food and nutrition-related knowledge deficit r/t lack of prior exposure to diet advancements beyond bariatric pureed diet aeb recent bariatric surgery and pt interest in diet education/review    Current: Food and nutrition-related knowledge deficit r/t lack of prior exposure to diet instruction beyond 3 months s/p SG as evidenced by recent bariatric surgery and pt interest in diet education/review    Intervention  Materials/Education provided, reviewed bariatric regular consistency diet, protein intake, fluid intake, eating pace, chewing foods well, portion control, sugar/fat intake, recommended vitamin/mineral supplements. Patient demonstrates understanding.     Expected Engagement: good    Goals:  1) Follow bariatric regular diet.   2) Consume 60+ grams of protein/day.  3) Sip on 48-64+ oz of fluids/day- between meals only.  4) Eat slowly (>20 min/meal), chewing foods well (to applesauce-like consistency).  5) Limit portions to ~1/2 - 3/4 cup/meal until 6 months post op.  6) Take the following after a Sleeve Gastrectomy:  - Multivitamin/minerals: adult dose 1-2 times daily  Ideally want one that provides:   5,000 - 10,000 international units vitamin A daily   800 mg oral folate daily  8 - 22 mg zinc and 1 - 2 mg copper daily  12 mg Thiamine  - Iron: 45-60 mg elemental (18-36 mg if low risk) - may partly or fully be covered in multivitamin   - Calcium Citrate containing vitamin D: 500 mg 3 times daily or 600 mg 2 times daily     - Separate the calcium from your multivitamin or iron by at least 2 hours.     - Must be a chewable calcium citrate until post-op 3 months     - Options for calcium citrate: Galo calcium citrate chewable, bariatric advantage calcium citrate chewable, Celebrate vitamins calcium citrate chewable, Bariatric Fusion  calcium citrate chewable  - Vitamin D - at least 3,000 international units/day between all supplements  - Vitamin B12: sublingual form of at least 500 mcg daily or injection of 1000 mcg monthly     Post-op Diet Handouts:  Diet Guidelines after Weight-loss Surgery  [email or web address]     Your Stage 4 Diet: Soft Foods  [email or web address]    Your Stage 5 Diet: Regular Foods  [email or web address]    Supplements after Sleeve Gastrectomy, Gastric Bypass or Single Anastomosis Duodenal Switch  [email or web address]    Keeping Track of Fluids  [email or web address]    Exercises after Weight Loss Surgery (strengthening, when no weight lifting restrictions)  [email or web address]       Follow-Up: January 4.    Time spent with patient: 13 minutes.  Tiffany Suero RD, LD  Clinic # [phone]

## 2023-11-10 NOTE — PATIENT INSTRUCTIONS
Robert Silva,     Keep up the great work.     Follow-up with RD on January 4.    Thank you,    Tiffany Suero, RD, LD  If you would like to schedule or reschedule an appointment with the RD, please call [phone]    Nutrition Goals  1) Follow bariatric regular diet.   2) Consume 60+ grams of protein/day.  3) Sip on 48-64+ oz of fluids/day- between meals only.  4) Eat slowly (>20 min/meal), chewing foods well (to applesauce-like consistency).  5) Limit portions to ~1/2 - 3/4 cup/meal until 6 months post op.  6) Take the following after a Sleeve Gastrectomy:  - Multivitamin/minerals: adult dose 1-2 times daily  Ideally want one that provides:   5,000 - 10,000 international units vitamin A daily   800 mg oral folate daily  8 - 22 mg zinc and 1 - 2 mg copper daily  12 mg Thiamine  - Iron: 45-60 mg elemental (18-36 mg if low risk) - may partly or fully be covered in multivitamin   - Calcium Citrate containing vitamin D: 500 mg 3 times daily or 600 mg 2 times daily     - Separate the calcium from your multivitamin or iron by at least 2 hours.     - Must be a chewable calcium citrate until post-op 3 months     - Options for calcium citrate: Galo calcium citrate chewable, bariatric advantage calcium citrate chewable, Celebrate vitamins calcium citrate chewable, Bariatric Fusion calcium citrate chewable  - Vitamin D - at least 3,000 international units/day between all supplements  - Vitamin B12: sublingual form of at least 500 mcg daily or injection of 1000 mcg monthly     Post-op Diet Handouts:  Diet Guidelines after Weight-loss Surgery  [email or web address]     Your Stage 4 Diet: Soft Foods  [email or web address]    Your Stage 5 Diet: Regular Foods  [email or web address]    Supplements after Sleeve Gastrectomy, Gastric Bypass or Single Anastomosis Duodenal Switch  [email or web address]    Keeping Track of Fluids  [email or web address]    Exercises after Weight Loss Surgery  (strengthening, when no weight lifting restrictions)  [email or web address]      COMPREHENSIVE WEIGHT MANAGEMENT PROGRAM  VIRTUAL SUPPORT GROUPS    For Support Group Information:      We offer support groups for patients who are working on weight loss and considering, preparing for, or have had weight loss surgery.     There is no cost for this opportunity.  You are invited to attend the?Virtual Support Groups?provided by any of the following locations:    [facility] via Microsoft Teams with Gemini Jay RN  2.   [place] via Triv[employer] with Daren Kline, PhD, LP  3.   [place] via [employer]riv[employer] with Georgia Abrams RN  4.   [facility] via Energy Storage Systems Teams with Georgia Cosby [facility]    The following Support Group information can also be found on our website:  [email or web address]      [facility] Weight Loss Surgery Support Group    [facility] Weight Loss Surgery Support Group  The support group is a patient-lead forum that meets monthly to share experiences, encouragement and education. It is open to those who have had weight loss surgery, are scheduled for surgery, or are considering surgery.   WHEN: This group meets on the 3rd Wednesday of each month from 5:00PM - 6:00PM virtually using Microsoft Teams.   FACILITATOR: Led by Gemini Epperson, DELTA, LD, RN, the program's Clinical Coordinator.   TO REGISTER: Please contact the clinic via [employer] or call the nurse line directly at [phone] to inform our staff that you would like an invite sent to you and to let us know the email you would like the invite sent to. Prior to the meeting, a link with directions on how to join the meeting will be sent to you.    2023 Meetings   September 20  October 18  November 15  December 20    [facility] Support Groups    Connections Bariatric Care Support  "Group?  This is open to all pre- and post- operative bariatric surgery patients as well as their support system.   WHEN: Starting June 2023, this group meets the 3rd Tuesday of each month from 6:30 PM - 8:00 PM virtually using Microsoft Teams.   FACILITATOR: Led by Daren Kline, Ph.D who is a Licensed Psychologist with the [facility] Comprehensive Weight Management Program.   TO REGISTER: Please send an email to Daren Kline, Ph.D., LP at?[email or web address]?if you would like an invitation to the group and to learn about using Microsoft Teams.    2023 Meetings  September 19 Nico Starr MD, FACS, [facility],   [facility], \"Body Contouring and the Bariatric Surgery Patient\".  October 17: Georgia Abrams RN, [facility],  Hospital Stay and Compliance   November 21: Gracy Roth RD, LD, [facility],  Holiday Eating   December 19    Connections Post-Operative Bariatric Surgery Support Group  This is a support group for [facility] bariatric patients (and those external to [facility]) who have had bariatric surgery and are at least 3 months post-surgery.  WHEN: This support group meets the 4th Wednesday of the month from 11:00 AM - 12:00 PM virtually using Microsoft Teams.   FACILITATOR: Led by Certified Bariatric Nurse, Georgia Abrams RN.   TO REGISTER: Please send an email to Georgia at [email or web address] if you would like an invitation to the group and to learn about using Microsoft Teams.    2023 Meetings September 27 October 25 November 22 December 27    [facility]   Healthy Lifestyle Virtual Support Group      Healthy Lifestyle Group  This is a 60 minute virtual coaching group for those who want to lead a healthier lifestyle. Come together to set goals and overcome barriers in a supportive group environment. We will address the four pillars of " "health--nutrition, exercise, sleep and emotional well-being.  This group is highly recommended for those who are participating in the 24 week Healthy Lifestyle Plan and our Health Coaching sessions.  WHEN: This group meets the first Friday of the month, 12:30 PM - 1:30 PM online, via a zoom meeting.    FACILITATOR: Led by National Board Certified Health and , Georgia Cosby [facility].  TO REGISTER: Please call the Call Center at [phone] to register. You will get an appointment to attend in [employer]. Fifteen minutes prior to the meeting, complete the e-check in and you will get the link to join the meeting.  There is no charge to attend this group and space is limited.    2023 and 2024 Meeting Topics and Dates:    November 3: Introduction to Mindfulness (Learn simple and effective mindfulness practices and how it can benefit you)  December 8: Let's Talk (guided discussion on our wins and challenges)  January 5: New Years Vision: Manifest your Best 2024! (Guided imagery,  journaling and discussion)  February 2: Let's Talk  March 1: 10 Percent Happier by Yuan Jett (Book Bites; a guided discussion on the nuggets of wisdom from favorite wellness books; no need to read the book but highly encouraged)  April 5: Let's Talk  May 3: \"Essentialism; The Disciplined Pursuit of Less by Vlad Hallman (book bites discussion)  June 7: Let's Talk  July 5: NO MEETING, off for the 4th of July Holiday  August 2: The Blue Zones, Secrets for Living a Longer Life by Yuan Nuñez (book bites discussion)          "

## 2023-11-10 NOTE — NURSING NOTE
Is the patient currently in the state of [place]? NO    Visit mode:VIDEO    If the visit is dropped, the patient can be reconnected by: VIDEO VISIT: Text to cell phone:   Telephone Information:   Mobile [phone]       Will anyone else be joining the visit? NO  (If patient encounters technical issues they should call [phone]    How would you like to obtain your AVS? MyChart    Are changes needed to the allergy or medication list? Pt stated no changes to allergies and Pt stated no med changes    Reason for visit: Follow Up    [place] JACOBS

## 2024-01-02 ENCOUNTER — VIRTUAL VISIT (OUTPATIENT)
Dept: ENDOCRINOLOGY | Facility: CLINIC | Age: 59
End: 2024-01-02
Payer: COMMERCIAL

## 2024-01-02 VITALS — WEIGHT: 178 LBS | HEIGHT: 62 IN | BODY MASS INDEX: 32.76 KG/M2

## 2024-01-02 DIAGNOSIS — E56.9 VITAMIN DEFICIENCY: ICD-10-CM

## 2024-01-02 DIAGNOSIS — E66.01 CLASS 3 SEVERE OBESITY WITH SERIOUS COMORBIDITY AND BODY MASS INDEX (BMI) OF 45.0 TO 49.9 IN ADULT, UNSPECIFIED OBESITY TYPE (H): Primary | ICD-10-CM

## 2024-01-02 DIAGNOSIS — K21.9 GASTROESOPHAGEAL REFLUX DISEASE WITHOUT ESOPHAGITIS: ICD-10-CM

## 2024-01-02 DIAGNOSIS — Z98.84 S/P LAPAROSCOPIC SLEEVE GASTRECTOMY: ICD-10-CM

## 2024-01-02 DIAGNOSIS — E66.813 CLASS 3 SEVERE OBESITY WITH SERIOUS COMORBIDITY AND BODY MASS INDEX (BMI) OF 45.0 TO 49.9 IN ADULT, UNSPECIFIED OBESITY TYPE (H): Primary | ICD-10-CM

## 2024-01-02 PROCEDURE — 99214 OFFICE O/P EST MOD 30 MIN: CPT | Mod: 95 | Performed by: NURSE PRACTITIONER

## 2024-01-02 RX ORDER — CHOLECALCIFEROL (VITAMIN D3) 125 MCG
10000 CAPSULE ORAL DAILY
Qty: 60 CAPSULE | Refills: 0 | Status: SHIPPED | OUTPATIENT
Start: 2024-01-02

## 2024-01-02 RX ORDER — PANTOPRAZOLE SODIUM 40 MG/1
40 TABLET, DELAYED RELEASE ORAL DAILY
Qty: 90 TABLET | Refills: 1 | Status: SHIPPED | OUTPATIENT
Start: 2024-01-02 | End: 2024-04-05

## 2024-01-02 RX ORDER — FAMOTIDINE 40 MG/1
40 TABLET, FILM COATED ORAL AT BEDTIME
Qty: 90 TABLET | Refills: 1 | Status: SHIPPED | OUTPATIENT
Start: 2024-01-02 | End: 2024-04-05

## 2024-01-02 ASSESSMENT — PAIN SCALES - GENERAL: PAINLEVEL: MODERATE PAIN (4)

## 2024-01-02 NOTE — PATIENT INSTRUCTIONS
"Thank you for allowing us the privilege of caring for you. We hope we provided you with the excellent service you deserve.   Please let us know if there is anything else we can do for you so that we can be sure you are completely satisfied with your care experience.    To ensure the quality of our services you may be receiving a patient satisfaction survey from an independent patient satisfaction monitoring company.    The greatest compliment you can give is a \"Likely to Recommend\"    Your visit was with Maryanne Urena NP today.    Instructions per today's visit:     Robert Best, it was great to visit with you today.  Here is a review of our visit.  If our clinic scheduler is not able to reach you please call [phone] to schedule your next appointments.    Try holding the urosdiol for 3 days - if dry mouth improves stay off of this, can restart if no change in symptoms   Work on increasing hydration - goal is 64oz daily   Slow down at meals - great work with using fork to slow down and looking away from meal, try putting a sticky note near where you eat to help remember to slow down   Continue pantoprazole and famotidine for reflux   Consider ways to help minimize disruptions in sleep   Can stop vitamin A once you run out, you can stop   Follow up in 3 months       Information about Video Visits with [facility]: video visit information  _________________________________________________________________________________________________________________________________________________________  If you are asked by your clinic team to have your blood pressure checked:  [facility] do offer several locations for blood pressure checks. Please follow the below link to schedule an appointment. Scheduling an appointment at the pharmacy for a blood pressure check is now preferred.    Appointment Plus " ([email or web address])  _________________________________________________________________________________________________________________________________________________________  Important contact and scheduling information:  Please call our contact center at [phone] to schedule your next appointments.  To find a lab location near you, please call [phone].  For any nursing questions or concerns call Kymberly Montemayor LPN at [phone] or Simona Maier RN at [phone]  Please call during clinic hours Monday through Friday 8:00a - 4:00p if you have questions or you can contact us via [employer] at anytime and we will reply during clinic hours.    Lab results will be communicated through My Chart or letter (if My Chart not used). Please call the clinic if you have not received communication after 1 week or if you have any questions.?  Clinic Fax: [phone]    _________________________________________________________________________________________________________________________________________________________  Meal Replacement Products:    Here is the link to our new e-store where you can purchase our meal replacement products    [facility] E-Store  [email or web address]/store    The one week starter kit is a great way to sample a variety of products and see what works for you.    If you want more information about the product go to: [employer] Meals  [email or web address]    If you are an employee or [facility] or [facility] please contact your care team for a 10% estore discount    Free Shipping for orders over $75     Benefits of meal replacements products:    Portion and calorie control  Improved nutrition  Structured eating  Simplified food choices  Avoid contact with trigger foods  _________________________________________________________________________________________________________________________________________________________  Interested in working with a health  ?  Health coaches work with you to improve your overall health and wellbeing.  They look at the whole person, and may involve discussion of different areas of life, including, but not limited to the four pillars of health (sleep, exercise, nutrition, and stress management). Discuss with your care team if you would like to start working a health .  Health Coaching-3 Pack: Schedule by calling [phone]    $99 for three health coaching visits    Visits may be done in person or via phone    Coaching is a partnership between the  and the client; Coaches do not prescribe or diagnose    Coaching helps inspire the client to reach his/her personal goals   _________________________________________________________________________________________________________________________________________________________  24 Week Healthy Lifestyle Plan:    Our mission in the 24-week Healthy Lifestyle Plan is to provide you with individualized care by giving you the tools, education and support you need to lose weight and maintain a healthy lifestyle. In your 24-week journey, you ll be supported by a dedicated weight loss team that includes registered dietitians, medical weight management providers, health coaches, and nurses -- all with special expertise in weight loss -- to help you every step of the way.     Monthly meetings with your registered dietician or medical weight management provider help to review your progress, update your care plan, and make any adjustments needed to ensure success. Between these visits, weekly and bi-weekly health  visits will help you focus on the four pillars of weight loss -- stress, sleep, nutrition, and exercise -- and how you can best adapt each to achieve sustainable weight loss results.    In addition, you will be given exclusive access to online wellbeing classes through [employer].  Your initial visit will be with a medical weight management provider who will help to  understand your weight loss goals and ensure this program is the right fit for you. Please let our team know if you are interested in the 24 week plan by sending a message to your care team or calling [phone] to schedule.  _________________________________________________________________________________________________________________________________________________________  __________  [facility] Get Moving Program  Our team of physical therapists is trained to help you understand and take control of your condition. They will perform a thorough evaluation to determine your ability for activity and develop a customized plan to fit your goals and physical ability.  Scheduling: Unsure if the Get Moving program is right for you? Discuss the program with your medical provider or diabetes educator. You can also call us at [phone] to ask questions or schedule an appointment.   [facility] Get Moving Program  ____________________________________________________________________________________________________________________________________________________________________________  [facility] Diabetes Prevention Program (DPP)  If you have prediabetes and Medicare please contact us via [employer] to learn more about the Diabetes Prevention Program (DPP)  Program Details:   [facility] offers the year-long Diabetes Prevention Program (DPP). The program helps you to make lifestyle changes that prevent or delay type 2 diabetes by supporting healthy eating, increased physical activity, stress reduction and use of coping skills.   On average, previous [facility] DPP cohorts have lost and maintained at least 5% of their starting weight throughout the program and averaged more than 150 minutes of physical activity per week.  Participants meet weekly for one-hour group sessions over sixteen weeks, every other week for the next 8 weeks, and monthly for the last six months.   A year-long  maintenance program is also available for participants who complete the first year.   Location & Cost:   During the COVID-19 Public Health Emergency, the program is offered virtually. When in-person classes can resume, they will be held at [facility].  For people with Medicare, the program is covered in full. A self-pay option will also be available for those with non-Medicare insurance plans.   ______________________________________________________________________________________________________________________________________________________________________________________________________________________________    To work with a Behavioral Health Psychologist:    Call to schedule:    Vitaliy Smith - [phone]  Roseanna Dudley - [phone]  Jhoana Wong - [phone]  Evie Bush - [phone]   Kacy Manuel PhD (cannot accept Medicare) [phone]        Thank you,   [facility] Comprehensive Weight Management Team

## 2024-01-02 NOTE — ASSESSMENT & PLAN NOTE
"Doing well overall. Has been struggling with persistent belching after meals and occasional \"sick\" feeling during meals. Suspect both are correlated with eating too quickly. She is aware of this and has been working on slowing down. Difficult to remember to do this. Discussed strategies.     Struggles with hydration during the day. Then, experiences dry mouth at night which is disrupting sleep. Has other sleep disruptions as well as chronic insomnia. Discussed starting by increasing hydration during the day to eliminate that piece of the disruption and slowly working on reducing other interruptions as able. Recommend trial off ursodiol which could be contributing to her dry mouth.     Try holding the urosdiol for 3 days - if dry mouth improves stay off of this, can restart if no change in symptoms   Work on increasing hydration - goal is 64oz daily   Slow down at meals - great work with using fork to slow down and looking away from meal, try putting a sticky note near where you eat to help remember to slow down   Continue pantoprazole and famotidine for reflux   Consider ways to help minimize disruptions in sleep   Can stop vitamin A once you run out, you can stop   Follow up in 3 months         "

## 2024-01-02 NOTE — PROGRESS NOTES
"  Return Bariatric Surgery Note    RE: Shira Best  MR#: [id number]  : 1965  VISIT DATE: 2024      Dear Karla Roberto,    I had the pleasure of seeing your patient, Shira Best, in my post-bariatric surgery assessment clinic.    Assessment & Plan   Problem List Items Addressed This Visit          Digestive    Class 3 severe obesity with serious comorbidity and body mass index (BMI) of 45.0 to 49.9 in adult (H) - Primary     Doing well overall. Has been struggling with persistent belching after meals and occasional \"sick\" feeling during meals. Suspect both are correlated with eating too quickly. She is aware of this and has been working on slowing down. Difficult to remember to do this. Discussed strategies.     Struggles with hydration during the day. Then, experiences dry mouth at night which is disrupting sleep. Has other sleep disruptions as well as chronic insomnia. Discussed starting by increasing hydration during the day to eliminate that piece of the disruption and slowly working on reducing other interruptions as able. Recommend trial off ursodiol which could be contributing to her dry mouth.     Try holding the urosdiol for 3 days - if dry mouth improves stay off of this, can restart if no change in symptoms   Work on increasing hydration - goal is 64oz daily   Slow down at meals - great work with using fork to slow down and looking away from meal, try putting a sticky note near where you eat to help remember to slow down   Continue pantoprazole and famotidine for reflux   Consider ways to help minimize disruptions in sleep   Can stop vitamin A once you run out, you can stop   Follow up in 3 months                   Other    S/P laparoscopic sleeve gastrectomy    Relevant Medications    vitamin A 3 MG (25891 UNITS) capsule     Other Visit Diagnoses       Gastroesophageal reflux disease without esophagitis        Relevant Medications    pantoprazole (PROTONIX) 40 MG EC tablet "    famotidine (PEPCID) 40 MG tablet    Vitamin deficiency        Relevant Medications    vitamin A 3 MG (89078 UNITS) capsule               35 minutes spent by me on the date of the encounter doing chart review, history and exam, documentation and further activities per the note    CHIEF COMPLAINT: Post-bariatric surgery follow-up. 3 months s/p sleeve.    HISTORY OF PRESENT ILLNESS:      1/2/2024     7:56 AM   Questions Regarding Prior Weight Loss Surgery Reviewed With Patient   I had the following weight loss procedure Sleeve Gastrectomy   What year was your surgery? 2023   How has your weight changed since your last visit? I have lost weight   Do you currently have any of the following Heartburn, acid reflux, or GERD (acid reflux disease)?   Do you have any concerns today? Dry mouth where the trouble swallows only at night, copd breathing thought get better,     Liquid and solids are going down slowly till. Belching some of these up. Will get a sick feeling when going too quickly.     Recent diet changes:   Tries to slow down at meals- using ways to interrupt eating to slow down   Fruit for snacks   3 meals a day - protein shake for 1-2 meals     -Protein- doing well with this. No dysphagia   -Water- struggles to remember- while on a work project - has been working on this   --- bringing bottle to work    Recent exercise/activity changes:   Knee injury limiting walking outside and biking   Started therapy for back     Recent stressors:   Off an on has been feeling unwell - cold symptoms   Exercise usually helps with stress- eager to get on bike     Recent sleep changes:   Very dry mouth at night- difficult to swallow   Not getting enough sleep- wakes hungry, sometimes just insomnia - hx of chronic insomnia at baseline   Dog sleeps with her, mom lives with her and has alzheimer's     Vitamins/Labs: reviewed 12/26/2023- replace vitamin A   Takes potassium at baseline and had skipped a couple doses     GERD symptoms:  some with food triggers (peppers, red sauce), taking famotidine and pantoprazole both     Weight History:      1/2/2024     7:56 AM   --   What is your highest lifetime weight? 278   What is your lowest weight since surgery? (In pounds) 178     Initial Weight (lbs): 252.2 lbs  Weight: 80.7 kg (178 lb)  Last Visits Weight: 88 kg (194 lb)  Cumulative weight loss (lbs): 74.2  Weight Loss Percentage: 29.42%    Wt Readings from Last 5 Encounters:   01/02/24 80.7 kg (178 lb)   11/10/23 88 kg (194 lb)   10/19/23 92.1 kg (203 lb)   10/02/23 92.1 kg (203 lb)   09/08/23 100.7 kg (222 lb)            1/2/2024     7:56 AM   Questions Regarding Co-Morbidities and Health Concerns Reviewed With Patient   Pre-diabetes Stayed the same   Diabetes II Never   High Blood Pressure Improved   High cholesterol Stayed the same   Heartburn/Reflux Improved   Sleep apnea Never   PCOS Never   Back pain Worsened   Joint pain Stayed the same   Lower leg swelling Stayed the same           1/2/2024     7:56 AM   Eating Habits   How many meals do you eat per day? 2   Do you snack between meals? Sometimes   How much food are you eating at each meal? 1/2 cup to 1 cup   Are you able to separate your meals and liquids by at least 30 minutes? Yes   Are you able to avoid liquid calories? Yes           1/2/2024     7:56 AM   Exercise Questions Reviewed With Patient   How often do you exercise? 1 to 2 times per week   What is the duration of your exercise (in minutes)? 30 Minutes   What types of exercise do you do? walking    other   What keeps you from being more active? Pain    Shortness of breath       Social History:      1/2/2024     7:56 AM   --   Are you smoking? No   Are you drinking alcohol? No       Medications:  Current Outpatient Medications   Medication    acetaminophen (TYLENOL) 325 MG tablet    albuterol (PROAIR HFA/PROVENTIL HFA/VENTOLIN HFA) 108 (90 Base) MCG/ACT inhaler    aspirin (ASA) 81 MG chewable tablet    benzonatate (TESSALON) 200 MG  capsule    Budeson-Glycopyrrol-Formoterol (BREZTRI AEROSPHERE) 160-9-4.8 MCG/ACT AERO    busPIRone (BUSPAR) 10 MG tablet    butalbital-acetaminophen-caffeine (ESGIC) -40 MG tablet    Cyanocobalamin (B-12) 500 MCG SUBL    empagliflozin (JARDIANCE) 10 MG TABS tablet    famotidine (PEPCID) 40 MG tablet    furosemide (LASIX) 20 MG tablet    gabapentin (NEURONTIN) 250 MG/5ML solution    galcanezumab-gnlm (EMGALITY) 120 MG/ML injection    metoprolol succinate ER (TOPROL XL) 25 MG 24 hr tablet    nystatin (MYCOSTATIN) 590562 UNIT/GM external powder    ondansetron (ZOFRAN ODT) 4 MG ODT tab    pantoprazole (PROTONIX) 40 MG EC tablet    potassium chloride ER (K-TAB/KLOR-CON) 10 MEQ CR tablet    riboflavin 100 MG CAPS    rosuvastatin (CRESTOR) 40 MG tablet    senna-docusate (SENOKOT-S/PERICOLACE) 8.6-50 MG tablet    sotalol (BETAPACE) 80 MG tablet    spironolactone (ALDACTONE) 25 MG tablet    topiramate (TOPAMAX) 100 MG tablet    traZODone (DESYREL) 100 MG tablet    ursodiol (ACTIGALL) 300 MG capsule    vitamin A 3 MG (12657 UNITS) capsule    ipratropium - albuterol 0.5 mg/2.5 mg/3 mL (DUONEB) 0.5-2.5 (3) MG/3ML neb solution    magnesium 250 MG tablet    prochlorperazine (COMPAZINE) 5 MG tablet     No current facility-administered medications for this visit.         1/2/2024     7:56 AM   --   Do you avoid NSAIDs such as (Ibuprofen, Aleve, Naproxen, Advil)? Yes       ROS:  GI:       1/2/2024     7:56 AM   --   Vomiting No   Diarrhea No   Constipation No   Swallowing trouble Yes   Abdominal pain No   Heartburn Yes     Skin:       1/2/2024     7:56 AM   BAR RBS ROS - SKIN   Rash in skin folds Yes     Psych:       1/2/2024     7:56 AM   --   Depression No   Anxiety No     Female Only:       1/2/2024     7:56 AM   BAR RBS ROS -    Female only None of the above   Stress urinary incontinence Yes       External Order Results on 12/18/2023   Component Date Value Ref Range Status    Vitamin A (External) 12/18/2023 20.4 (L)   "32.5 - 78.0 mcg/dL Final              No data to display                  PHYSICAL EXAM:  Objective    Ht 1.575 m (5' 2\")   Wt 80.7 kg (178 lb)   BMI 32.56 kg/m    Vitals - Patient Reported  Pain Score: Moderate Pain (4)  Pain Loc: Low Back        Physical Exam   GENERAL: Healthy, alert and no distress  EYES: Eyes grossly normal to inspection.  No discharge or erythema, or obvious scleral/conjunctival abnormalities.  RESP: No audible wheeze, cough, or visible cyanosis.  No visible retractions or increased work of breathing.    SKIN: Visible skin clear. No significant rash, abnormal pigmentation or lesions.  NEURO: Cranial nerves grossly intact.  Mentation and speech appropriate for age.  PSYCH: Mentation appears normal, affect normal/bright, judgement and insight intact, normal speech and appearance well-groomed.        Sincerely,    Maryanne Urena NP    Virtual Visit Details    Type of service:  Video Visit     Originating Location (pt. Location): Home    Distant Location (provider location):  Off-site  Platform used for Video Visit: Toñito"

## 2024-01-02 NOTE — LETTER
"2024       RE: Shira Best  [address] [facility] [address]     Dear Colleague,    Thank you for referring your patient, Shira Best, to the [facility] WEIGHT MANAGEMENT [facility]INIC [place] at [facility]. Please see a copy of my visit note below.      Return Bariatric Surgery Note    RE: Shira Best  MR#: [id number]  : 1965  VISIT DATE: 2024      Dear Karla Roberto,    I had the pleasure of seeing your patient, Shira Best, in my post-bariatric surgery assessment clinic.    Assessment & Plan  Problem List Items Addressed This Visit          Digestive    Class 3 severe obesity with serious comorbidity and body mass index (BMI) of 45.0 to 49.9 in adult (H) - Primary     Doing well overall. Has been struggling with persistent belching after meals and occasional \"sick\" feeling during meals. Suspect both are correlated with eating too quickly. She is aware of this and has been working on slowing down. Difficult to remember to do this. Discussed strategies.     Struggles with hydration during the day. Then, experiences dry mouth at night which is disrupting sleep. Has other sleep disruptions as well as chronic insomnia. Discussed starting by increasing hydration during the day to eliminate that piece of the disruption and slowly working on reducing other interruptions as able. Recommend trial off ursodiol which could be contributing to her dry mouth.     Try holding the urosdiol for 3 days - if dry mouth improves stay off of this, can restart if no change in symptoms   Work on increasing hydration - goal is 64oz daily   Slow down at meals - great work with using fork to slow down and looking away from meal, try putting a sticky note near where you eat to help remember to slow down   Continue pantoprazole and famotidine for reflux   Consider ways to help minimize disruptions in sleep   Can stop vitamin A once " you run out, you can stop   Follow up in 3 months                   Other    S/P laparoscopic sleeve gastrectomy    Relevant Medications    vitamin A 3 MG (35381 UNITS) capsule     Other Visit Diagnoses       Gastroesophageal reflux disease without esophagitis        Relevant Medications    pantoprazole (PROTONIX) 40 MG EC tablet    famotidine (PEPCID) 40 MG tablet    Vitamin deficiency        Relevant Medications    vitamin A 3 MG (14768 UNITS) capsule               35 minutes spent by me on the date of the encounter doing chart review, history and exam, documentation and further activities per the note    CHIEF COMPLAINT: Post-bariatric surgery follow-up. 3 months s/p sleeve.    HISTORY OF PRESENT ILLNESS:      1/2/2024     7:56 AM   Questions Regarding Prior Weight Loss Surgery Reviewed With Patient   I had the following weight loss procedure Sleeve Gastrectomy   What year was your surgery? 2023   How has your weight changed since your last visit? I have lost weight   Do you currently have any of the following Heartburn, acid reflux, or GERD (acid reflux disease)?   Do you have any concerns today? Dry mouth where the trouble swallows only at night, copd breathing thought get better,     Liquid and solids are going down slowly till. Belching some of these up. Will get a sick feeling when going too quickly.     Recent diet changes:   Tries to slow down at meals- using ways to interrupt eating to slow down   Fruit for snacks   3 meals a day - protein shake for 1-2 meals     -Protein- doing well with this. No dysphagia   -Water- struggles to remember- while on a work project - has been working on this   --- bringing bottle to work    Recent exercise/activity changes:   Knee injury limiting walking outside and biking   Started therapy for back     Recent stressors:   Off an on has been feeling unwell - cold symptoms   Exercise usually helps with stress- eager to get on bike     Recent sleep changes:   Very dry mouth  at night- difficult to swallow   Not getting enough sleep- wakes hungry, sometimes just insomnia - hx of chronic insomnia at baseline   Dog sleeps with her, mom lives with her and has alzheimer's     Vitamins/Labs: reviewed 12/26/2023- replace vitamin A   Takes potassium at baseline and had skipped a couple doses     GERD symptoms: some with food triggers (peppers, red sauce), taking famotidine and pantoprazole both     Weight History:      1/2/2024     7:56 AM   --   What is your highest lifetime weight? 278   What is your lowest weight since surgery? (In pounds) 178     Initial Weight (lbs): 252.2 lbs  Weight: 80.7 kg (178 lb)  Last Visits Weight: 88 kg (194 lb)  Cumulative weight loss (lbs): 74.2  Weight Loss Percentage: 29.42%    Wt Readings from Last 5 Encounters:   01/02/24 80.7 kg (178 lb)   11/10/23 88 kg (194 lb)   10/19/23 92.1 kg (203 lb)   10/02/23 92.1 kg (203 lb)   09/08/23 100.7 kg (222 lb)            1/2/2024     7:56 AM   Questions Regarding Co-Morbidities and Health Concerns Reviewed With Patient   Pre-diabetes Stayed the same   Diabetes II Never   High Blood Pressure Improved   High cholesterol Stayed the same   Heartburn/Reflux Improved   Sleep apnea Never   PCOS Never   Back pain Worsened   Joint pain Stayed the same   Lower leg swelling Stayed the same           1/2/2024     7:56 AM   Eating Habits   How many meals do you eat per day? 2   Do you snack between meals? Sometimes   How much food are you eating at each meal? 1/2 cup to 1 cup   Are you able to separate your meals and liquids by at least 30 minutes? Yes   Are you able to avoid liquid calories? Yes           1/2/2024     7:56 AM   Exercise Questions Reviewed With Patient   How often do you exercise? 1 to 2 times per week   What is the duration of your exercise (in minutes)? 30 Minutes   What types of exercise do you do? walking    other   What keeps you from being more active? Pain    Shortness of breath       Social History:       1/2/2024     7:56 AM   --   Are you smoking? No   Are you drinking alcohol? No       Medications:  Current Outpatient Medications   Medication    acetaminophen (TYLENOL) 325 MG tablet    albuterol (PROAIR HFA/PROVENTIL HFA/VENTOLIN HFA) 108 (90 Base) MCG/ACT inhaler    aspirin (ASA) 81 MG chewable tablet    benzonatate (TESSALON) 200 MG capsule    Budeson-Glycopyrrol-Formoterol (BREZTRI AEROSPHERE) 160-9-4.8 MCG/ACT AERO    busPIRone (BUSPAR) 10 MG tablet    butalbital-acetaminophen-caffeine (ESGIC) -40 MG tablet    Cyanocobalamin (B-12) 500 MCG SUBL    empagliflozin (JARDIANCE) 10 MG TABS tablet    famotidine (PEPCID) 40 MG tablet    furosemide (LASIX) 20 MG tablet    gabapentin (NEURONTIN) 250 MG/5ML solution    galcanezumab-gnlm (EMGALITY) 120 MG/ML injection    metoprolol succinate ER (TOPROL XL) 25 MG 24 hr tablet    nystatin (MYCOSTATIN) 437427 UNIT/GM external powder    ondansetron (ZOFRAN ODT) 4 MG ODT tab    pantoprazole (PROTONIX) 40 MG EC tablet    potassium chloride ER (K-TAB/KLOR-CON) 10 MEQ CR tablet    riboflavin 100 MG CAPS    rosuvastatin (CRESTOR) 40 MG tablet    senna-docusate (SENOKOT-S/PERICOLACE) 8.6-50 MG tablet    sotalol (BETAPACE) 80 MG tablet    spironolactone (ALDACTONE) 25 MG tablet    topiramate (TOPAMAX) 100 MG tablet    traZODone (DESYREL) 100 MG tablet    ursodiol (ACTIGALL) 300 MG capsule    vitamin A 3 MG (46582 UNITS) capsule    ipratropium - albuterol 0.5 mg/2.5 mg/3 mL (DUONEB) 0.5-2.5 (3) MG/3ML neb solution    magnesium 250 MG tablet    prochlorperazine (COMPAZINE) 5 MG tablet     No current facility-administered medications for this visit.         1/2/2024     7:56 AM   --   Do you avoid NSAIDs such as (Ibuprofen, Aleve, Naproxen, Advil)? Yes       ROS:  GI:       1/2/2024     7:56 AM   --   Vomiting No   Diarrhea No   Constipation No   Swallowing trouble Yes   Abdominal pain No   Heartburn Yes     Skin:       1/2/2024     7:56 AM   BAR RBS ROS - SKIN   Rash in skin  "folds Yes     Psych:       1/2/2024     7:56 AM   --   Depression No   Anxiety No     Female Only:       1/2/2024     7:56 AM   BAR RBS ROS -    Female only None of the above   Stress urinary incontinence Yes       External Order Results on 12/18/2023   Component Date Value Ref Range Status    Vitamin A (External) 12/18/2023 20.4 (L)  32.5 - 78.0 mcg/dL Final              No data to display                  PHYSICAL EXAM:  Objective   Ht 1.575 m (5' 2\")   Wt 80.7 kg (178 lb)   BMI 32.56 kg/m    Vitals - Patient Reported  Pain Score: Moderate Pain (4)  Pain Loc: Low Back        Physical Exam   GENERAL: Healthy, alert and no distress  EYES: Eyes grossly normal to inspection.  No discharge or erythema, or obvious scleral/conjunctival abnormalities.  RESP: No audible wheeze, cough, or visible cyanosis.  No visible retractions or increased work of breathing.    SKIN: Visible skin clear. No significant rash, abnormal pigmentation or lesions.  NEURO: Cranial nerves grossly intact.  Mentation and speech appropriate for age.  PSYCH: Mentation appears normal, affect normal/bright, judgement and insight intact, normal speech and appearance well-groomed.        Sincerely,    Maryanne Urena NP    Virtual Visit Details    Type of service:  Video Visit     Originating Location (pt. Location): Home    Distant Location (provider location):  Off-site  Platform used for Video Visit: Toñito    "

## 2024-01-02 NOTE — NURSING NOTE
Is the patient currently in the state of [place]? YES    Visit mode:VIDEO    If the visit is dropped, the patient can be reconnected by: VIDEO VISIT: Text to cell phone:   Telephone Information:   Mobile [phone]       Will anyone else be joining the visit? NO  (If patient encounters technical issues they should call [phone]    How would you like to obtain your AVS? MyChart    Are changes needed to the allergy or medication list? Yes Pt states taking acetomin ophen 500mg otc now instead of 325mg.    Reason for visit: RECHECK    Pt states 4/10 low back pain chronic today.    Tarun MENJIVARF

## 2024-01-04 ENCOUNTER — VIRTUAL VISIT (OUTPATIENT)
Dept: ENDOCRINOLOGY | Facility: CLINIC | Age: 59
End: 2024-01-04
Payer: COMMERCIAL

## 2024-01-04 VITALS — HEIGHT: 62 IN | BODY MASS INDEX: 32.76 KG/M2 | WEIGHT: 178 LBS

## 2024-01-04 DIAGNOSIS — Z71.3 NUTRITIONAL COUNSELING: Primary | ICD-10-CM

## 2024-01-04 DIAGNOSIS — Z98.84 S/P LAPAROSCOPIC SLEEVE GASTRECTOMY: ICD-10-CM

## 2024-01-04 PROCEDURE — 99207 PR NO CHARGE LOS: CPT | Mod: 95

## 2024-01-04 PROCEDURE — 97803 MED NUTRITION INDIV SUBSEQ: CPT | Mod: 95

## 2024-01-04 ASSESSMENT — PAIN SCALES - GENERAL: PAINLEVEL: MODERATE PAIN (4)

## 2024-01-04 NOTE — PATIENT INSTRUCTIONS
Robert Silva,     Follow-up with RD on April 9.     Thank you,    Tiffany Suero, RD, LD  If you would like to schedule or reschedule an appointment with the RD, please call [phone]    Nutrition Goals  1) Follow bariatric regular diet.   2) Consume 60+ grams of protein/day.  3) Sip on 48-64+ oz of fluids/day- between meals only.  4) Eat slowly (>20 min/meal), chewing foods well (to applesauce-like consistency).  5) Limit portions to ~1/2 - 3/4 cup/meal until 6 months post op.  6) Take the following after a Sleeve Gastrectomy:  - Multivitamin/minerals: adult dose 1-2 times daily  Ideally want one that provides:   5,000 - 10,000 international units vitamin A daily   800 mg oral folate daily  8 - 22 mg zinc and 1 - 2 mg copper daily  12 mg Thiamine  - Iron: 45-60 mg elemental (18-36 mg if low risk) - may partly or fully be covered in multivitamin   - Calcium Citrate containing vitamin D: 500 mg 3 times daily or 600 mg 2 times daily     - Separate the calcium from your multivitamin or iron by at least 2 hours.     - Must be a chewable calcium citrate until post-op 3 months     - Options for calcium citrate: Galo calcium citrate chewable, bariatric advantage calcium citrate chewable, Celebrate vitamins calcium citrate chewable, Bariatric Fusion calcium citrate chewable  - Vitamin D - at least 3,000 international units/day between all supplements  - Vitamin B12: sublingual form of at least 500 mcg daily or injection of 1000 mcg monthly     Post-op Diet Handouts:  Diet Guidelines after Weight-loss Surgery  [email or web address]     Your Stage 5 Diet: Regular Foods  [email or web address]    Supplements after Sleeve Gastrectomy, Gastric Bypass or Single Anastomosis Duodenal Switch  [email or web address]    Keeping Track of Fluids  [email or web address]    Exercises after Weight Loss Surgery (strengthening, when no weight lifting  restrictions)  [email or web address]    COMPREHENSIVE WEIGHT MANAGEMENT PROGRAM  VIRTUAL SUPPORT GROUPS    At [facility], our Comprehensive Weight Management program offers on-line support groups for patients who are working on weight loss and considering, preparing for, or have had weight loss surgery.     There is no cost for this opportunity.  You are invited to attend the?Virtual Support Groups?provided by any of the following locations:    [facility] via Microsoft Teams with Gemini Jay RN  2.   [place] via Muzzley with Daren Kline, PhD, LP  3.   [place] via Muzzley with Georgia Abrams RN  4.   [facility] via a Zoom Meeting with CHANNING Olivo    The following Support Group information can also be found on our website:  [email or web address]      [facility] Weight Loss Surgery Support Group  The support group is a patient-lead forum that meets monthly to share experiences, encouragement and education. It is open to those who have had weight loss surgery, are scheduled for surgery, or are considering surgery.   WHEN: This group meets on the 3rd Wednesday of each month from 5:00PM - 6:00PM virtually using Microsoft Teams.   FACILITATOR: Led by Gemini Epperson RD, LD, RN, the program's Clinical Coordinator.   TO REGISTER: Please contact the clinic via Tubett or call the nurse line directly at [phone] to inform our staff that you would like an invite sent to you and to let us know the email you would like the invite sent to. Prior to the meeting, a link with directions on how to join the meeting will be sent to you.    2024 Meetings   January 17  February 21  March 20  April 17  May 15  Ivelisse 19      [facility] [place] Bariatric Care Support Group?  This is open to all pre- and post- operative bariatric surgery  "patients as well as their support system.   WHEN: This group meets the 3rd Tuesday of each month from 6:30 PM - 8:00 PM virtually using Microsoft Teams.   FACILITATOR: Led by Daren Kline, Ph.D who is a Licensed Psychologist with the [facility] Comprehensive Weight Management Program.   TO REGISTER: Please send an email to Daren Kline, Ph.D.,  at?[email or web address]?if you would like an invitation to the group. Prior to the meeting, a link with directions on how to join the meeting will be sent to you.    2024 Meetings January 16: \"Medication Management and Bariatric Surgery\", Casandra Ceballos, PharmD, Pharmacy Resident at [facility]  February 20: \"A Bariatric Surgery Patient's Perspective\", LEX Rodríguez, [facility], Behavioral Health Clinician at [facility]  March 19  April 16  May 21  Ivelisse 18: \"Nutritional Labeling\", Dietitian speaker to be announced.  November 19: \"Holiday Eating\", Dietitian speaker to be announced.    [facility] Post-Operative Bariatric Surgery Support Group  This is a support group for [facility] bariatric patients (and those external to [facility]) who have had bariatric surgery and are at least 3 months post-surgery.  WHEN: This support group meets the 4th Wednesday of the month from 11:00 AM - 12:00 PM virtually using Microsoft Teams.   FACILITATOR: Led by Certified Bariatric Nurse, Georgia Abrams RN.   TO REGISTER: Please send an email to Georgia at [email or web address] if you would like an invitation to the group.  Prior to the meeting, a link with directions on how to join the meeting will be sent to you.    2024 Meetings  January 24  February 28  March 27  April 24  May 22  Ivelisse 26    [facility] Healthy Lifestyle Group?  This is a 60 minute virtual coaching group for those who want to lead a healthier lifestyle. " "Come together to set goals and overcome barriers in a supportive group environment. We will address the four pillars of health: nutrition, exercise, sleep and emotional well-being.  This group is highly recommended for those who are participating in the 24 week Healthy Lifestyle Plan and our Health Coaching sessions.  WHEN: This group meets the 1st Friday of the month, 12:30 PM - 1:30 PM online, via a zoom meeting.    FACILITATOR: Led by National Board Certified Health and , Georgia [facility].   TO REGISTER: Please call the Call Center at [phone] to register. You will get an appointment to attend in [employer]. Fifteen minutes prior to the meeting, complete the e-check in and you will get the link to join the meeting.    There is no charge to attend this group and space is limited.     2024 Meetings  January 5: \"New Years Vision: Manifest your Best 2024!\" (guided imagery,  journaling and discussion)  February 2: \"Let's Talk\"  March 1: \"10 Percent Happier\" by Yuan Jett (Book Bites - a guided discussion on the nuggets of wisdom from favorite wellness books, no need to read the book but highly encouraged)  April 5: \"Let's Talk\"  May 3: \"Essentialism: The Disciplined Pursuit of Less\" by Vlad Wade (Book Bites discussion)  June 7: \"Let's Talk\"  July 5: NO MEETING, off for the 4th of July Holiday  August 2: \"The Blue Zones, Secrets for Living a Longer Life\" by Yuan Nuñez (Book Bites discussion)        "

## 2024-01-04 NOTE — PROGRESS NOTES
"Video-Visit Details    Type of service:  Video Visit    Video Start Time: 1:30 PM  Video End Time: 1:48 PM     Originating Location (pt. Location): Home    Distant Location (provider location):  Offsite (providers home) [facility] WEIGHT MANAGEMENT CLINIC [place]     Platform used for Video Visit: Toñito    Nutrition Reassessment  Reason For Visit:  Shira Best is a 58 year old female presenting today for nutrition follow-up, 4 month s/p laparoscopic sleeve gastrectomy 8/29/23  with Dr Higgins.     Pt referred by CONCHIS Brandon.    Anthropometrics  Initial Consult Weight: 252 lbs  Day of Surgery Weight (8/29/23): 221 lbs  Current Weight:   Estimated body mass index is 32.55 kg/m  as calculated from the following:    Height as of this encounter: 1.575 m (5' 2.01\").    Weight as of this encounter: 80.7 kg (178 lb).    Weight loss: -74 lbs from initial consult; -43 lbs from day of surgery     Got 3 month labs done, reviewed.     Current Vitamins/Minerals: MVI/minerals 2 tablets once per day. Has a vitamin D gelcap at home, Vitamin B12 500 mcg, Vitamin C. Plans to  Vitamin A due to deficiency     Nutrition History:  Stomach feels sore right under breasts and down to the belly button, more so when moving/walking. Started over the weekend. Feels more sore today. Back to work last week - sitting at a desk for long periods. She also reports feeling nauseous and \"icky\" after taking night-time meds. Team notified.     Starting and tolerating bariatric soft solids. Pureed diet went well.   Fluids: Water (20-30 oz/day), diet tea (20 oz/day)   Protein: 60 gm/day  Meals a little more than 1/4 cup    Diet Recall:  Protein shake (Premier protein, 600 mg calcium)   B: banana and greek yogurt   L: taco meat with cheese, salsa and a few crackers; pb bread   D: taco meat  Snack: 4-5 snacks daily yogurt, sugar-free pudding, SF popcicle/jello     November 2023: Eating is going ok on the regular diet. Gets " full really fast, can't have a lot to eat. Portions tend to be around 1/2 cup of food at a meal. Eats protein first. Drinking liquids, still incorporating 2 protein shakes per day. Potatoes have been a little harder to tolerate. Eats slowly at meals. Likes fruit most, sometimes meals are harder to eat.     Bowel movements are daily.     January 2024: Was dealing with dry mouth. No longer on gallbladder meds. Plans to start Vitamin A due to be deficient on 3 month labs. Doing well on regular diet. Eating 1/2 cup of food at meals. Trying to increase her water intake, already for the day was at 30 oz. Eating good amounts of protein. Likes fruit. 1 protein shake per day. Knows at times she was eating too fast, so is working on slowing this down.     Progress with Previous Goals:  1) Continue bariatric regular diet, as tolerated - met, continues  2) Consume 60+ grams of protein/day. - met, continues  3) Sip on 48-64+ oz of fluids/day- between meals only. -met, continues  4) Eat slowly (>20 min/meal), chewing foods well (to applesauce-like consistency). -met, continues  5) Limit portions to ~1/2 until 3 months post op  - met  6) Take the following after a Sleeve Gastrectomy: - met  - Multivitamin/minerals: adult dose 1-2 times daily  Ideally want one that provides:   5,000 - 10,000 international units vitamin A daily   800 mg oral folate daily  8 - 22 mg zinc and 1 - 2 mg copper daily  12 mg Thiamine  - Iron: 45-60 mg elemental (18-36 mg if low risk) - may partly or fully be covered in multivitamin   - Calcium Citrate containing vitamin D: 500 mg 3 times daily or 600 mg 2 times daily     - Separate the calcium from your multivitamin or iron by at least 2 hours.     - Must be a chewable calcium citrate until post-op 3 months     - Options for calcium citrate: Galo calcium citrate chewable, bariatric advantage calcium citrate chewable, Celebrate vitamins calcium citrate chewable, Bariatric Fusion calcium citrate chewable  -  Vitamin D - at least 3,000 international units/day between all supplements  - Vitamin B12: sublingual form of at least 500 mcg daily or injection of 1000 mcg monthly     Nutrition Prescription:  Grams Protein: 60 (minimum)   Amount of Fluid: 48-64 oz    Nutrition Diagnosis  Previous: Food and nutrition-related knowledge deficit r/t lack of prior exposure to diet advancements beyond bariatric pureed diet aeb recent bariatric surgery and pt interest in diet education/review    Current: Food and nutrition-related knowledge deficit r/t lack of prior exposure to diet instruction beyond 3 months s/p SG as evidenced by recent bariatric surgery and pt interest in diet education/review    Intervention  Materials/Education provided, reviewed bariatric regular consistency diet, protein intake, fluid intake, eating pace, chewing foods well, portion control, sugar/fat intake, recommended vitamin/mineral supplements. Patient demonstrates understanding.     Expected Engagement: good    Goals:  1) Follow bariatric regular diet.   2) Consume 60+ grams of protein/day.  3) Sip on 48-64+ oz of fluids/day- between meals only.  4) Eat slowly (>20 min/meal), chewing foods well (to applesauce-like consistency).  5) Limit portions to ~1/2 - 3/4 cup/meal until 6 months post op.  6) Take the following after a Sleeve Gastrectomy:  - Multivitamin/minerals: adult dose 1-2 times daily  Ideally want one that provides:   5,000 - 10,000 international units vitamin A daily   800 mg oral folate daily  8 - 22 mg zinc and 1 - 2 mg copper daily  12 mg Thiamine  - Iron: 45-60 mg elemental (18-36 mg if low risk) - may partly or fully be covered in multivitamin   - Calcium Citrate containing vitamin D: 500 mg 3 times daily or 600 mg 2 times daily     - Separate the calcium from your multivitamin or iron by at least 2 hours.     - Must be a chewable calcium citrate until post-op 3 months     - Options for calcium citrate: Galo calcium citrate chewable,  bariatric advantage calcium citrate chewable, Celebrate vitamins calcium citrate chewable, Bariatric Fusion calcium citrate chewable  - Vitamin D - at least 3,000 international units/day between all supplements  - Vitamin B12: sublingual form of at least 500 mcg daily or injection of 1000 mcg monthly     Post-op Diet Handouts:  Diet Guidelines after Weight-loss Surgery  [email or web address]     Your Stage 5 Diet: Regular Foods  [email or web address]    Supplements after Sleeve Gastrectomy, Gastric Bypass or Single Anastomosis Duodenal Switch  [email or web address]    Keeping Track of Fluids  [email or web address]    Exercises after Weight Loss Surgery (strengthening, when no weight lifting restrictions)  [email or web address]       Follow-Up: April 9    Time spent with patient: 18 minutes.  Tiffany Suero RD, LD  Clinic # [phone]

## 2024-01-04 NOTE — NURSING NOTE
Is the patient currently in the state of [place]? NO    Visit mode:VIDEO    If the visit is dropped, the patient can be reconnected by: VIDEO VISIT: Text to cell phone:   Telephone Information:   Mobile [phone]       Will anyone else be joining the visit? NO  (If patient encounters technical issues they should call [phone]    How would you like to obtain your AVS? MyChart    Are changes needed to the allergy or medication list? N/A    Reason for visit: RECHECK ([facility])    Nichole Hanson VVF    Pt stated in [place]

## 2024-01-04 NOTE — LETTER
"1/4/2024       RE: Shira Best  [address] [facility] [address]     Dear Colleague,    Thank you for referring your patient, Shira Best, to the [facility] [place] at [facility]. Please see a copy of my visit note below.    Video-Visit Details    Type of service:  Video Visit    Video Start Time: 1:30 PM  Video End Time: 1:48 PM     Originating Location (pt. Location): Home    Distant Location (provider location):  Offsite (providers home) [facility] [place]     Platform used for Video Visit: Get-n-Post    Nutrition Reassessment  Reason For Visit:  Shira Best is a 58 year old female presenting today for nutrition follow-up, 4 month s/p laparoscopic sleeve gastrectomy 8/29/23  with Dr Higgins.     Pt referred by CONCHIS Brandon.    Anthropometrics  Initial Consult Weight: 252 lbs  Day of Surgery Weight (8/29/23): 221 lbs  Current Weight:   Estimated body mass index is 32.55 kg/m  as calculated from the following:    Height as of this encounter: 1.575 m (5' 2.01\").    Weight as of this encounter: 80.7 kg (178 lb).    Weight loss: -74 lbs from initial consult; -43 lbs from day of surgery     Got 3 month labs done, reviewed.     Current Vitamins/Minerals: MVI/minerals 2 tablets once per day. Has a vitamin D gelcap at home, Vitamin B12 500 mcg, Vitamin C. Plans to  Vitamin A due to deficiency     Nutrition History:  Stomach feels sore right under breasts and down to the belly button, more so when moving/walking. Started over the weekend. Feels more sore today. Back to work last week - sitting at a desk for long periods. She also reports feeling nauseous and \"icky\" after taking night-time meds. Team notified.     Starting and tolerating bariatric soft solids. Pureed diet went well.   Fluids: Water (20-30 oz/day), diet tea (20 oz/day)   Protein: 60 gm/day  Meals a " little more than 1/4 cup    Diet Recall:  Protein shake (Premier protein, 600 mg calcium)   B: banana and greek yogurt   L: taco meat with cheese, salsa and a few crackers; pb bread   D: taco meat  Snack: 4-5 snacks daily yogurt, sugar-free pudding, SF popcicle/jello     November 2023: Eating is going ok on the regular diet. Gets full really fast, can't have a lot to eat. Portions tend to be around 1/2 cup of food at a meal. Eats protein first. Drinking liquids, still incorporating 2 protein shakes per day. Potatoes have been a little harder to tolerate. Eats slowly at meals. Likes fruit most, sometimes meals are harder to eat.     Bowel movements are daily.     January 2024: Was dealing with dry mouth. No longer on gallbladder meds. Plans to start Vitamin A due to be deficient on 3 month labs. Doing well on regular diet. Eating 1/2 cup of food at meals. Trying to increase her water intake, already for the day was at 30 oz. Eating good amounts of protein. Likes fruit. 1 protein shake per day. Knows at times she was eating too fast, so is working on slowing this down.     Progress with Previous Goals:  1) Continue bariatric regular diet, as tolerated - met, continues  2) Consume 60+ grams of protein/day. - met, continues  3) Sip on 48-64+ oz of fluids/day- between meals only. -met, continues  4) Eat slowly (>20 min/meal), chewing foods well (to applesauce-like consistency). -met, continues  5) Limit portions to ~1/2 until 3 months post op  - met  6) Take the following after a Sleeve Gastrectomy: - met  - Multivitamin/minerals: adult dose 1-2 times daily  Ideally want one that provides:   5,000 - 10,000 international units vitamin A daily   800 mg oral folate daily  8 - 22 mg zinc and 1 - 2 mg copper daily  12 mg Thiamine  - Iron: 45-60 mg elemental (18-36 mg if low risk) - may partly or fully be covered in multivitamin   - Calcium Citrate containing vitamin D: 500 mg 3 times daily or 600 mg 2 times daily     -  Separate the calcium from your multivitamin or iron by at least 2 hours.     - Must be a chewable calcium citrate until post-op 3 months     - Options for calcium citrate: Galo calcium citrate chewable, bariatric advantage calcium citrate chewable, Celebrate vitamins calcium citrate chewable, Bariatric Fusion calcium citrate chewable  - Vitamin D - at least 3,000 international units/day between all supplements  - Vitamin B12: sublingual form of at least 500 mcg daily or injection of 1000 mcg monthly     Nutrition Prescription:  Grams Protein: 60 (minimum)   Amount of Fluid: 48-64 oz    Nutrition Diagnosis  Previous: Food and nutrition-related knowledge deficit r/t lack of prior exposure to diet advancements beyond bariatric pureed diet aeb recent bariatric surgery and pt interest in diet education/review    Current: Food and nutrition-related knowledge deficit r/t lack of prior exposure to diet instruction beyond 3 months s/p SG as evidenced by recent bariatric surgery and pt interest in diet education/review    Intervention  Materials/Education provided, reviewed bariatric regular consistency diet, protein intake, fluid intake, eating pace, chewing foods well, portion control, sugar/fat intake, recommended vitamin/mineral supplements. Patient demonstrates understanding.     Expected Engagement: good    Goals:  1) Follow bariatric regular diet.   2) Consume 60+ grams of protein/day.  3) Sip on 48-64+ oz of fluids/day- between meals only.  4) Eat slowly (>20 min/meal), chewing foods well (to applesauce-like consistency).  5) Limit portions to ~1/2 - 3/4 cup/meal until 6 months post op.  6) Take the following after a Sleeve Gastrectomy:  - Multivitamin/minerals: adult dose 1-2 times daily  Ideally want one that provides:   5,000 - 10,000 international units vitamin A daily   800 mg oral folate daily  8 - 22 mg zinc and 1 - 2 mg copper daily  12 mg Thiamine  - Iron: 45-60 mg elemental (18-36 mg if low risk) - may partly  or fully be covered in multivitamin   - Calcium Citrate containing vitamin D: 500 mg 3 times daily or 600 mg 2 times daily     - Separate the calcium from your multivitamin or iron by at least 2 hours.     - Must be a chewable calcium citrate until post-op 3 months     - Options for calcium citrate: Galo calcium citrate chewable, bariatric advantage calcium citrate chewable, Celebrate vitamins calcium citrate chewable, Bariatric Fusion calcium citrate chewable  - Vitamin D - at least 3,000 international units/day between all supplements  - Vitamin B12: sublingual form of at least 500 mcg daily or injection of 1000 mcg monthly     Post-op Diet Handouts:  Diet Guidelines after Weight-loss Surgery  [email or web address]     Your Stage 5 Diet: Regular Foods  [email or web address]    Supplements after Sleeve Gastrectomy, Gastric Bypass or Single Anastomosis Duodenal Switch  [email or web address]    Keeping Track of Fluids  [email or web address]    Exercises after Weight Loss Surgery (strengthening, when no weight lifting restrictions)  [email or web address]       Follow-Up: April 9    Time spent with patient: 18 minutes.  Tiffany Suero RD, LD  Clinic # [phone]

## 2024-01-31 DIAGNOSIS — E66.813 CLASS 3 SEVERE OBESITY WITH SERIOUS COMORBIDITY AND BODY MASS INDEX (BMI) OF 45.0 TO 49.9 IN ADULT, UNSPECIFIED OBESITY TYPE (H): ICD-10-CM

## 2024-01-31 DIAGNOSIS — Z98.84 S/P LAPAROSCOPIC SLEEVE GASTRECTOMY: ICD-10-CM

## 2024-01-31 DIAGNOSIS — E66.01 CLASS 3 SEVERE OBESITY WITH SERIOUS COMORBIDITY AND BODY MASS INDEX (BMI) OF 45.0 TO 49.9 IN ADULT, UNSPECIFIED OBESITY TYPE (H): ICD-10-CM

## 2024-01-31 RX ORDER — URSODIOL 300 MG/1
300 CAPSULE ORAL 2 TIMES DAILY
Qty: 60 CAPSULE | Refills: 2 | Status: CANCELLED | OUTPATIENT
Start: 2024-01-31

## 2024-02-01 ENCOUNTER — MYC MEDICAL ADVICE (OUTPATIENT)
Dept: ENDOCRINOLOGY | Facility: CLINIC | Age: 59
End: 2024-02-01
Payer: COMMERCIAL

## 2024-03-10 ENCOUNTER — HEALTH MAINTENANCE LETTER (OUTPATIENT)
Age: 59
End: 2024-03-10

## 2024-04-05 ENCOUNTER — VIRTUAL VISIT (OUTPATIENT)
Dept: ENDOCRINOLOGY | Facility: CLINIC | Age: 59
End: 2024-04-05
Payer: COMMERCIAL

## 2024-04-05 VITALS — WEIGHT: 165 LBS | BODY MASS INDEX: 30.36 KG/M2 | HEIGHT: 62 IN

## 2024-04-05 DIAGNOSIS — E66.813 CLASS 3 SEVERE OBESITY WITH SERIOUS COMORBIDITY AND BODY MASS INDEX (BMI) OF 45.0 TO 49.9 IN ADULT, UNSPECIFIED OBESITY TYPE (H): Primary | ICD-10-CM

## 2024-04-05 DIAGNOSIS — K21.9 GASTROESOPHAGEAL REFLUX DISEASE WITHOUT ESOPHAGITIS: ICD-10-CM

## 2024-04-05 DIAGNOSIS — Z98.84 S/P LAPAROSCOPIC SLEEVE GASTRECTOMY: ICD-10-CM

## 2024-04-05 DIAGNOSIS — E66.01 CLASS 3 SEVERE OBESITY WITH SERIOUS COMORBIDITY AND BODY MASS INDEX (BMI) OF 45.0 TO 49.9 IN ADULT, UNSPECIFIED OBESITY TYPE (H): Primary | ICD-10-CM

## 2024-04-05 PROCEDURE — 99213 OFFICE O/P EST LOW 20 MIN: CPT | Mod: 95 | Performed by: NURSE PRACTITIONER

## 2024-04-05 RX ORDER — FAMOTIDINE 40 MG/1
40 TABLET, FILM COATED ORAL AT BEDTIME
Qty: 90 TABLET | Refills: 1 | Status: SHIPPED | OUTPATIENT
Start: 2024-04-05 | End: 2024-10-04

## 2024-04-05 RX ORDER — PANTOPRAZOLE SODIUM 40 MG/1
40 TABLET, DELAYED RELEASE ORAL DAILY
Qty: 90 TABLET | Refills: 1 | Status: SHIPPED | OUTPATIENT
Start: 2024-04-05 | End: 2024-10-04

## 2024-04-05 RX ORDER — OXYCODONE HYDROCHLORIDE 15 MG/1
7.5 TABLET ORAL
COMMUNITY
Start: 2024-03-13

## 2024-04-05 ASSESSMENT — PAIN SCALES - GENERAL: PAINLEVEL: MODERATE PAIN (5)

## 2024-04-05 NOTE — PATIENT INSTRUCTIONS
"Thank you for allowing us the privilege of caring for you. We hope we provided you with the excellent service you deserve.   Please let us know if there is anything else we can do for you so that we can be sure you are completely satisfied with your care experience.    To ensure the quality of our services you may be receiving a patient satisfaction survey from an independent patient satisfaction monitoring company.    The greatest compliment you can give is a \"Likely to Recommend\"    Your visit was with Maryanne Urena NP today.    Instructions per today's visit:     Robert Best, it was great to visit with you today.  Here is a review of our visit.  If our clinic scheduler is not able to reach you please call [phone] to schedule your next appointments.    Continue pepcid (famotidine)   Restart pantoprazole (protonix)   Continue to prioritize sleep   Great work getting on the bike!   Follow up 6 months, sooner if needed       Information about Video Visits with "[facility] [employer]: video visit information  _________________________________________________________________________________________________________________________________________________________  If you are asked by your clinic team to have your blood pressure checked:  [facility] do offer several locations for blood pressure checks. Please follow the below link to schedule an appointment. Scheduling an appointment at the pharmacy for a blood pressure check is now preferred.    Appointment Plus (appointment-plus.[email or web address])  _________________________________________________________________________________________________________________________________________________________  Important contact and scheduling information:  Please call our contact center at [phone] to schedule your next appointments.  To find a lab location near you, please call [phone].  For any nursing questions or concerns call Kymberly Montemayor LPN at [phone] or " Simona Maier RN at [phone]  Please call during clinic hours Monday through Friday 8:00a - 4:00p if you have questions or you can contact us via [employer] at anytime and we will reply during clinic hours.    Lab results will be communicated through My Chart or letter (if My Chart not used). Please call the clinic if you have not received communication after 1 week or if you have any questions.?  Clinic Fax: [phone]    _________________________________________________________________________________________________________________________________________________________  Meal Replacement Products:    Here is the link to our new e-store where you can purchase our meal replacement products     [employer] E-Store  Dreampod/store    The one week starter kit is a great way to sample a variety of products and see what works for you.    If you want more information about the product go to: [email or web address].[email or web address]    If you are an employee or [facility] or [facility] please contact your care team for a 10% estore discount    Free Shipping for orders over $75     Benefits of meal replacements products:    Portion and calorie control  Improved nutrition  Structured eating  Simplified food choices  Avoid contact with trigger foods  _________________________________________________________________________________________________________________________________________________________  Interested in working with a health ?  Health coaches work with you to improve your overall health and wellbeing.  They look at the whole person, and may involve discussion of different areas of life, including, but not limited to the four pillars of health (sleep, exercise, nutrition, and stress management). Discuss with your care team if you would like to start working a health .  Health Coaching-3 Pack: Schedule by calling [phone]    $99 for three health coaching  visits    Visits may be done in person or via phone    Coaching is a partnership between the  and the client; Coaches do not prescribe or diagnose    Coaching helps inspire the client to reach his/her personal goals   _________________________________________________________________________________________________________________________________________________________  24 Week Healthy Lifestyle Plan:    Our mission in the 24-week Healthy Lifestyle Plan is to provide you with individualized care by giving you the tools, education and support you need to lose weight and maintain a healthy lifestyle. In your 24-week journey, you ll be supported by a dedicated weight loss team that includes registered dietitians, medical weight management providers, health coaches, and nurses -- all with special expertise in weight loss -- to help you every step of the way.     Monthly meetings with your registered dietician or medical weight management provider help to review your progress, update your care plan, and make any adjustments needed to ensure success. Between these visits, weekly and bi-weekly health  visits will help you focus on the four pillars of weight loss -- stress, sleep, nutrition, and exercise -- and how you can best adapt each to achieve sustainable weight loss results.    In addition, you will be given exclusive access to online wellbeing classes through [employer].  Your initial visit will be with a medical weight management provider who will help to understand your weight loss goals and ensure this program is the right fit for you. Please let our team know if you are interested in the 24 week plan by sending a message to your care team or calling [phone] to schedule.  _________________________________________________________________________________________________________________________________________________________  __________  [facility] Get Moving Program  Our team of  physical therapists is trained to help you understand and take control of your condition. They will perform a thorough evaluation to determine your ability for activity and develop a customized plan to fit your goals and physical ability.  Scheduling: Unsure if the Get Moving program is right for you? Discuss the program with your medical provider or diabetes educator. You can also call us at [phone] to ask questions or schedule an appointment.   [facility] Get Moving Program  ____________________________________________________________________________________________________________________________________________________________________________  [facility] Diabetes Prevention Program (DPP)  If you have prediabetes and Medicare please contact us via [employer] to learn more about the Diabetes Prevention Program (DPP)  Program Details:  [facility] offers the year-long Diabetes Prevention Program (DPP). The program helps you to make lifestyle changes that prevent or delay type 2 diabetes by supporting healthy eating, increased physical activity, stress reduction and use of coping skills.   On average, previous [facility] DPP cohorts have lost and maintained at least 5% of their starting weight throughout the program and averaged more than 150 minutes of physical activity per week.  Participants meet weekly for one-hour group sessions over sixteen weeks, every other week for the next 8 weeks, and monthly for the last six months.   A year-long maintenance program is also available for participants who complete the first year.   Location & Cost:   During the COVID-19 Public Health Emergency, the program is offered virtually. When in-person classes can resume, they will be held at [facility].  For people with Medicare, the program is covered in full. A self-pay option will also be available for those with non-Medicare insurance plans.    ______________________________________________________________________________________________________________________________________________________________________________________________________________________________    To work with a Behavioral Health Psychologist:    Call to schedule:    Vitaliy Smith - [phone]  Roseanna Dudley - [phone]  Jhoana Wong - [phone]  Evie Bush - [phone]   Kacy Manuel PhD (cannot accept Medicare) [phone]        Thank [facility] Comprehensive Weight Management Team

## 2024-04-05 NOTE — NURSING NOTE
Is the patient currently in the state of [place]? YES    Visit mode:VIDEO    If the visit is dropped, the patient can be reconnected by: VIDEO VISIT: Text to cell phone:   Telephone Information:   Mobile [phone]       Will anyone else be joining the visit? NO  (If patient encounters technical issues they should call [phone]    How would you like to obtain your AVS? MyChart    Are changes needed to the allergy or medication list? Yes   and Pt stated no changes to allergies  Please remove and meds marked not taking or flagged for removal.         Reason for visit: RECHECK    Wt/ht other than 24 hrs:    Pain more than one location:  5 knees  Lilia MENJIVARF

## 2024-04-05 NOTE — ASSESSMENT & PLAN NOTE
Doing really well. 6 months postop. More reflux recently- had stopped pantoprazole and started drinking diet soda. Stopped soda last week and feeling a lot better. Will restart pantoprazole.     Increased stress in last 3 weeks but trying to manage this. Mostly at work. Riding bike has been helpful.     Appetite well controlled. Adequate intake.     Continue pepcid (famotidine)   Restart pantoprazole (protonix)   Continue to prioritize sleep   Great work getting on the bike!   Follow up 6 months, sooner if needed   Labs at that time

## 2024-04-05 NOTE — PROGRESS NOTES
Return Bariatric Surgery Note    RE: Shira Best  MR#: [id number]  : 1965  VISIT DATE: 2024      Dear Karla Roberto,    I had the pleasure of seeing your patient, Shira Best, in my post-bariatric surgery assessment clinic.    Assessment & Plan   Problem List Items Addressed This Visit        Digestive    Class 3 severe obesity with serious comorbidity and body mass index (BMI) of 45.0 to 49.9 in adult (H) - Primary     Doing really well. 6 months postop. More reflux recently- had stopped pantoprazole and started drinking diet soda. Stopped soda last week and feeling a lot better. Will restart pantoprazole.     Increased stress in last 3 weeks but trying to manage this. Mostly at work. Riding bike has been helpful.     Appetite well controlled. Adequate intake.     Continue pepcid (famotidine)   Restart pantoprazole (protonix)   Continue to prioritize sleep   Great work getting on the bike!   Follow up 6 months, sooner if needed   Labs at that time          Relevant Orders    CBC with platelets    Comprehensive metabolic panel    Hemoglobin A1c    Parathyroid Hormone Intact    Vitamin A    Vitamin B12    Vitamin D Deficiency       Other    S/P laparoscopic sleeve gastrectomy    Relevant Orders    CBC with platelets    Comprehensive metabolic panel    Hemoglobin A1c    Parathyroid Hormone Intact    Vitamin A    Vitamin B12    Vitamin D Deficiency   Other Visit Diagnoses     Gastroesophageal reflux disease without esophagitis        Relevant Medications    famotidine (PEPCID) 40 MG tablet    pantoprazole (PROTONIX) 40 MG EC tablet             20 minutes spent by me on the date of the encounter doing chart review, history and exam, documentation and further activities per the note    CHIEF COMPLAINT: Post-bariatric surgery follow-up. 2023 Dr. Higgins     HISTORY OF PRESENT ILLNESS:      2024     2:29 PM   Questions Regarding Prior Weight Loss Surgery Reviewed With Patient   I had the  "following weight loss procedure Sleeve Gastrectomy   What year was your surgery? 2023   How has your weight changed since your last visit? I have lost weight   Do you currently have any of the following Heartburn, acid reflux, or GERD (acid reflux disease)?   Do you have any concerns today? No      Feeling really happy about her weight loss. Would love to lose another 5-10lb by her birthday in 5 weeks     Recent diet changes:   Doing much better with slowing down per recall   Doing better at stopping before over full   Yogurt and fruit if needed for snacks   Minimal cravings for sugar or chips - just diet soda   No cravings for candy/ cookies     -Protein- adequate   -Water- adequate     Recent exercise/activity changes:   Back pain has been worse recently. Has follow up with neurosurgery next week.   Was able to ride bike a couple days ago - felt good   Does PT exercises at bedtime too     Recent stressors:   High anxiety in the last 3 weeks- related to work-new roles - pcp increased anxiety meds recently     Recent sleep changes: \"not the greatest\"   Taking trazadone - maybe helpful   Up to go to bathroom but easier to sleep for longer when taking trazoadone if she takes it the first time she wakes up     Vitamins/Labs: bariatric labs due 8/2024     GERD symptoms:taking pepcid daily. Having heart burn worse in the evening, some specific food triggers (tomato based foods, diet soda, ketchup) - has been avoiding this for a week and maybe improving     No tobacco, no alcohol,     Weight History:      4/5/2024     2:29 PM   --   What is your highest lifetime weight? 278   What is your lowest weight since surgery? (In pounds) 165     Initial Weight (lbs): 252.2 lbs  Weight: 74.8 kg (165 lb)  Last Visits Weight: 80.7 kg (178 lb)  Cumulative weight loss (lbs): 87.2  Weight Loss Percentage: 34.58%        4/5/2024     2:29 PM   Questions Regarding Co-Morbidities and Health Concerns Reviewed With Patient   Pre-diabetes " Improved   Diabetes II Never   High Blood Pressure Improved   High cholesterol Improved   Heartburn/Reflux Stayed the same   Sleep apnea Never   PCOS Never   Back pain Stayed the same   Joint pain Stayed the same   Lower leg swelling Improved           4/5/2024     2:29 PM   Eating Habits   How many meals do you eat per day? 3   Do you snack between meals? Sometimes   How much food are you eating at each meal? Less than 1/2 cup   Are you able to separate your meals and liquids by at least 30 minutes? Yes   Are you able to avoid liquid calories? Yes           4/5/2024     2:29 PM   Exercise Questions Reviewed With Patient   How often do you exercise? 3 to 4 times per week   What is the duration of your exercise (in minutes)? 30 Minutes   What types of exercise do you do? other   What keeps you from being more active? I am as active as I can possbily be       Social History:      4/5/2024     2:29 PM   --   Are you smoking? No   Are you drinking alcohol? No       Medications:  Current Outpatient Medications   Medication Sig Dispense Refill     acetaminophen (TYLENOL) 325 MG tablet Take 650 mg by mouth every 6 hours as needed for mild pain       albuterol (PROAIR HFA/PROVENTIL HFA/VENTOLIN HFA) 108 (90 Base) MCG/ACT inhaler Inhale 1-2 puffs into the lungs every 4 hours as needed for shortness of breath       aspirin (ASA) 81 MG chewable tablet Take 1 tablet (81 mg) by mouth daily Resume taking on POD 2       benzonatate (TESSALON) 200 MG capsule Take 200 mg by mouth 3 times daily as needed for cough       Budeson-Glycopyrrol-Formoterol (BREZTRI AEROSPHERE) 160-9-4.8 MCG/ACT AERO Inhale 2 puffs into the lungs 2 times daily       busPIRone (BUSPAR) 10 MG tablet Take 10 mg by mouth 3 times daily       butalbital-acetaminophen-caffeine (ESGIC) -40 MG tablet Take 1 tablet by mouth as needed       Cyanocobalamin (B-12) 500 MCG SUBL Place 1 tablet under the tongue daily 30 tablet 11     empagliflozin (JARDIANCE) 10 MG  "TABS tablet Take 10 mg by mouth daily       famotidine (PEPCID) 40 MG tablet Take 1 tablet (40 mg) by mouth at bedtime 90 tablet 1     furosemide (LASIX) 20 MG tablet Take 20 mg by mouth daily       gabapentin (NEURONTIN) 250 MG/5ML solution Take 18 mLs (900 mg) by mouth 3 times daily 470 mL 0     galcanezumab-gnlm (EMGALITY) 120 MG/ML injection        metoprolol succinate ER (TOPROL XL) 25 MG 24 hr tablet Take 25 mg by mouth daily Original Rx for 37.5 mg (1.5 tablet) once daily, however pt has only been doing 1 tablet daily. Per pt MD aware.       nystatin (MYCOSTATIN) 403451 UNIT/GM external powder Apply 60 g topically as needed       ondansetron (ZOFRAN ODT) 4 MG ODT tab Take 1 tablet (4 mg) by mouth every 8 hours as needed for nausea 15 tablet 0     oxyCODONE IR (ROXICODONE) 15 MG tablet Take 7.5 mg by mouth       pantoprazole (PROTONIX) 40 MG EC tablet Take 1 tablet (40 mg) by mouth daily 90 tablet 1     potassium chloride ER (K-TAB/KLOR-CON) 10 MEQ CR tablet Take 10 mEq by mouth daily       riboflavin 100 MG CAPS Take 1 tablet by mouth 2 times daily       rosuvastatin (CRESTOR) 40 MG tablet Take 1 tablet by mouth At Bedtime       senna-docusate (SENOKOT-S/PERICOLACE) 8.6-50 MG tablet Take 2 tablets by mouth daily as needed for constipation (While taking narcotic pain medications.  Stop taking if having loose stools.) 30 tablet 1     sotalol (BETAPACE) 80 MG tablet Take 80 mg by mouth 2 times daily Original Rx was for 1.5 tablet BID but was \"too much\" and dropped dose back to 1 tablet twice daily (per pt MD aware).       spironolactone (ALDACTONE) 25 MG tablet Take 25 mg by mouth daily       topiramate (TOPAMAX) 100 MG tablet Take 100 mg by mouth At Bedtime       traZODone (DESYREL) 100 MG tablet Take 200 mg by mouth At Bedtime       ipratropium - albuterol 0.5 mg/2.5 mg/3 mL (DUONEB) 0.5-2.5 (3) MG/3ML neb solution Take 1 vial by nebulization every 6 hours as needed for shortness of breath, wheezing or cough " "(Patient not taking: Reported on 1/2/2024)       magnesium 250 MG tablet Take 250 mg by mouth (Patient not taking: Reported on 9/8/2023)       prochlorperazine (COMPAZINE) 5 MG tablet Take 1-2 tablets by mouth at onset of migraine headache, limit 3 times per week. (Patient not taking: Reported on 1/2/2024)       vitamin A 3 MG (38895 UNITS) capsule Take 1 capsule (10,000 Units) by mouth daily (Patient not taking: Reported on 4/5/2024) 60 capsule 0     No current facility-administered medications for this visit.         4/5/2024     2:29 PM   --   Do you avoid NSAIDs such as (Ibuprofen, Aleve, Naproxen, Advil)? Yes       ROS:  GI:       4/5/2024     2:29 PM   --   Vomiting No   Diarrhea No   Constipation No   Swallowing trouble No   Abdominal pain No   Heartburn Yes     Skin:       4/5/2024     2:29 PM   BAR RBS ROS - SKIN   Rash in skin folds Yes     Psych:       4/5/2024     2:29 PM   --   Depression No   Anxiety Yes     Female Only:       4/5/2024     2:29 PM   BAR RBS ROS -    Female only Polycystic ovarian syndrome (PCOS)   Stress urinary incontinence No       External Order Results on 12/18/2023   Component Date Value Ref Range Status     Vitamin A (External) 12/18/2023 20.4 (L)  32.5 - 78.0 mcg/dL Final              No data to display                  PHYSICAL EXAM:  Objective    Ht 1.581 m (5' 2.25\")   Wt 74.8 kg (165 lb)   BMI 29.94 kg/m    Vitals - Patient Reported  Pain Score: Moderate Pain (5)  Pain Loc:  (knees)        Physical Exam   GENERAL: alert and no distress  EYES: Eyes grossly normal to inspection.  No discharge or erythema, or obvious scleral/conjunctival abnormalities.  RESP: No audible wheeze, cough, or visible cyanosis.    SKIN: Visible skin clear. No significant rash, abnormal pigmentation or lesions.  NEURO: Cranial nerves grossly intact.  Mentation and speech appropriate for age.  PSYCH: Appropriate affect, tone, and pace of words        Sincerely,    aMryanne Urena NP      Virtual Visit " Details    Type of service:  Video Visit     Originating Location (pt. Location): Home    Distant Location (provider location):  Off-site  Platform used for Video Visit: Toñito

## 2024-04-05 NOTE — Clinical Note
Please fax labs to [facility]- where last labs were done. No rush they are not due for 6 months thanks

## 2024-04-05 NOTE — LETTER
2024       RE: Shira Best  [address] [facility] [address]     Dear Colleague,    Thank you for referring your patient, Shira Best, to the [facility] [place] [facility]. Please see a copy of my visit note below.    Return Bariatric Surgery Note    RE: Shira Best  MR#: [id number]  : 1965  VISIT DATE: 2024      Dear Karla Roberto,    I had the pleasure of seeing your patient, Shira Best, in my post-bariatric surgery assessment clinic.    Assessment & Plan   Problem List Items Addressed This Visit          Digestive    Class 3 severe obesity with serious comorbidity and body mass index (BMI) of 45.0 to 49.9 in adult (H) - Primary     Doing really well. 6 months postop. More reflux recently- had stopped pantoprazole and started drinking diet soda. Stopped soda last week and feeling a lot better. Will restart pantoprazole.     Increased stress in last 3 weeks but trying to manage this. Mostly at work. Riding bike has been helpful.     Appetite well controlled. Adequate intake.     Continue pepcid (famotidine)   Restart pantoprazole (protonix)   Continue to prioritize sleep   Great work getting on the bike!   Follow up 6 months, sooner if needed   Labs at that time          Relevant Orders    CBC with platelets    Comprehensive metabolic panel    Hemoglobin A1c    Parathyroid Hormone Intact    Vitamin A    Vitamin B12    Vitamin D Deficiency       Other    S/P laparoscopic sleeve gastrectomy    Relevant Orders    CBC with platelets    Comprehensive metabolic panel    Hemoglobin A1c    Parathyroid Hormone Intact    Vitamin A    Vitamin B12    Vitamin D Deficiency     Other Visit Diagnoses       Gastroesophageal reflux disease without esophagitis        Relevant Medications    famotidine (PEPCID) 40 MG tablet    pantoprazole (PROTONIX) 40 MG EC tablet               20 minutes  "spent by me on the date of the encounter doing chart review, history and exam, documentation and further activities per the note    CHIEF COMPLAINT: Post-bariatric surgery follow-up. 8/2023 Dr. Higgins     HISTORY OF PRESENT ILLNESS:      4/5/2024     2:29 PM   Questions Regarding Prior Weight Loss Surgery Reviewed With Patient   I had the following weight loss procedure Sleeve Gastrectomy   What year was your surgery? 2023   How has your weight changed since your last visit? I have lost weight   Do you currently have any of the following Heartburn, acid reflux, or GERD (acid reflux disease)?   Do you have any concerns today? No      Feeling really happy about her weight loss. Would love to lose another 5-10lb by her birthday in 5 weeks     Recent diet changes:   Doing much better with slowing down per recall   Doing better at stopping before over full   Yogurt and fruit if needed for snacks   Minimal cravings for sugar or chips - just diet soda   No cravings for candy/ cookies     -Protein- adequate   -Water- adequate     Recent exercise/activity changes:   Back pain has been worse recently. Has follow up with neurosurgery next week.   Was able to ride bike a couple days ago - felt good   Does PT exercises at bedtime too     Recent stressors:   High anxiety in the last 3 weeks- related to work-new roles - pcp increased anxiety meds recently     Recent sleep changes: \"not the greatest\"   Taking trazadone - maybe helpful   Up to go to bathroom but easier to sleep for longer when taking trazoadone if she takes it the first time she wakes up     Vitamins/Labs: bariatric labs due 8/2024     GERD symptoms:taking pepcid daily. Having heart burn worse in the evening, some specific food triggers (tomato based foods, diet soda, ketchup) - has been avoiding this for a week and maybe improving     No tobacco, no alcohol,     Weight History:      4/5/2024     2:29 PM   --   What is your highest lifetime weight? 278   What is " your lowest weight since surgery? (In pounds) 165     Initial Weight (lbs): 252.2 lbs  Weight: 74.8 kg (165 lb)  Last Visits Weight: 80.7 kg (178 lb)  Cumulative weight loss (lbs): 87.2  Weight Loss Percentage: 34.58%        4/5/2024     2:29 PM   Questions Regarding Co-Morbidities and Health Concerns Reviewed With Patient   Pre-diabetes Improved   Diabetes II Never   High Blood Pressure Improved   High cholesterol Improved   Heartburn/Reflux Stayed the same   Sleep apnea Never   PCOS Never   Back pain Stayed the same   Joint pain Stayed the same   Lower leg swelling Improved           4/5/2024     2:29 PM   Eating Habits   How many meals do you eat per day? 3   Do you snack between meals? Sometimes   How much food are you eating at each meal? Less than 1/2 cup   Are you able to separate your meals and liquids by at least 30 minutes? Yes   Are you able to avoid liquid calories? Yes           4/5/2024     2:29 PM   Exercise Questions Reviewed With Patient   How often do you exercise? 3 to 4 times per week   What is the duration of your exercise (in minutes)? 30 Minutes   What types of exercise do you do? other   What keeps you from being more active? I am as active as I can possbily be       Social History:      4/5/2024     2:29 PM   --   Are you smoking? No   Are you drinking alcohol? No       Medications:  Current Outpatient Medications   Medication Sig Dispense Refill    acetaminophen (TYLENOL) 325 MG tablet Take 650 mg by mouth every 6 hours as needed for mild pain      albuterol (PROAIR HFA/PROVENTIL HFA/VENTOLIN HFA) 108 (90 Base) MCG/ACT inhaler Inhale 1-2 puffs into the lungs every 4 hours as needed for shortness of breath      aspirin (ASA) 81 MG chewable tablet Take 1 tablet (81 mg) by mouth daily Resume taking on POD 2      benzonatate (TESSALON) 200 MG capsule Take 200 mg by mouth 3 times daily as needed for cough      Budeson-Glycopyrrol-Formoterol (BREZTRI AEROSPHERE) 160-9-4.8 MCG/ACT AERO Inhale 2  "puffs into the lungs 2 times daily      busPIRone (BUSPAR) 10 MG tablet Take 10 mg by mouth 3 times daily      butalbital-acetaminophen-caffeine (ESGIC) -40 MG tablet Take 1 tablet by mouth as needed      Cyanocobalamin (B-12) 500 MCG SUBL Place 1 tablet under the tongue daily 30 tablet 11    empagliflozin (JARDIANCE) 10 MG TABS tablet Take 10 mg by mouth daily      famotidine (PEPCID) 40 MG tablet Take 1 tablet (40 mg) by mouth at bedtime 90 tablet 1    furosemide (LASIX) 20 MG tablet Take 20 mg by mouth daily      gabapentin (NEURONTIN) 250 MG/5ML solution Take 18 mLs (900 mg) by mouth 3 times daily 470 mL 0    galcanezumab-gnlm (EMGALITY) 120 MG/ML injection       metoprolol succinate ER (TOPROL XL) 25 MG 24 hr tablet Take 25 mg by mouth daily Original Rx for 37.5 mg (1.5 tablet) once daily, however pt has only been doing 1 tablet daily. Per pt MD aware.      nystatin (MYCOSTATIN) 931695 UNIT/GM external powder Apply 60 g topically as needed      ondansetron (ZOFRAN ODT) 4 MG ODT tab Take 1 tablet (4 mg) by mouth every 8 hours as needed for nausea 15 tablet 0    oxyCODONE IR (ROXICODONE) 15 MG tablet Take 7.5 mg by mouth      pantoprazole (PROTONIX) 40 MG EC tablet Take 1 tablet (40 mg) by mouth daily 90 tablet 1    potassium chloride ER (K-TAB/KLOR-CON) 10 MEQ CR tablet Take 10 mEq by mouth daily      riboflavin 100 MG CAPS Take 1 tablet by mouth 2 times daily      rosuvastatin (CRESTOR) 40 MG tablet Take 1 tablet by mouth At Bedtime      senna-docusate (SENOKOT-S/PERICOLACE) 8.6-50 MG tablet Take 2 tablets by mouth daily as needed for constipation (While taking narcotic pain medications.  Stop taking if having loose stools.) 30 tablet 1    sotalol (BETAPACE) 80 MG tablet Take 80 mg by mouth 2 times daily Original Rx was for 1.5 tablet BID but was \"too much\" and dropped dose back to 1 tablet twice daily (per pt MD aware).      spironolactone (ALDACTONE) 25 MG tablet Take 25 mg by mouth daily      " "topiramate (TOPAMAX) 100 MG tablet Take 100 mg by mouth At Bedtime      traZODone (DESYREL) 100 MG tablet Take 200 mg by mouth At Bedtime      ipratropium - albuterol 0.5 mg/2.5 mg/3 mL (DUONEB) 0.5-2.5 (3) MG/3ML neb solution Take 1 vial by nebulization every 6 hours as needed for shortness of breath, wheezing or cough (Patient not taking: Reported on 1/2/2024)      magnesium 250 MG tablet Take 250 mg by mouth (Patient not taking: Reported on 9/8/2023)      prochlorperazine (COMPAZINE) 5 MG tablet Take 1-2 tablets by mouth at onset of migraine headache, limit 3 times per week. (Patient not taking: Reported on 1/2/2024)      vitamin A 3 MG (19441 UNITS) capsule Take 1 capsule (10,000 Units) by mouth daily (Patient not taking: Reported on 4/5/2024) 60 capsule 0     No current facility-administered medications for this visit.         4/5/2024     2:29 PM   --   Do you avoid NSAIDs such as (Ibuprofen, Aleve, Naproxen, Advil)? Yes       ROS:  GI:       4/5/2024     2:29 PM   --   Vomiting No   Diarrhea No   Constipation No   Swallowing trouble No   Abdominal pain No   Heartburn Yes     Skin:       4/5/2024     2:29 PM   BAR RBS ROS - SKIN   Rash in skin folds Yes     Psych:       4/5/2024     2:29 PM   --   Depression No   Anxiety Yes     Female Only:       4/5/2024     2:29 PM   BAR RBS ROS -    Female only Polycystic ovarian syndrome (PCOS)   Stress urinary incontinence No       External Order Results on 12/18/2023   Component Date Value Ref Range Status    Vitamin A (External) 12/18/2023 20.4 (L)  32.5 - 78.0 mcg/dL Final              No data to display                  PHYSICAL EXAM:  Objective    Ht 1.581 m (5' 2.25\")   Wt 74.8 kg (165 lb)   BMI 29.94 kg/m    Vitals - Patient Reported  Pain Score: Moderate Pain (5)  Pain Loc:  (knees)        Physical Exam   GENERAL: alert and no distress  EYES: Eyes grossly normal to inspection.  No discharge or erythema, or obvious scleral/conjunctival abnormalities.  RESP: No " audible wheeze, cough, or visible cyanosis.    SKIN: Visible skin clear. No significant rash, abnormal pigmentation or lesions.  NEURO: Cranial nerves grossly intact.  Mentation and speech appropriate for age.  PSYCH: Appropriate affect, tone, and pace of words        Sincerely,    Maryanne Urena NP      Virtual Visit Details    Type of service:  Video Visit     Originating Location (pt. Location): Home    Distant Location (provider location):  Off-site  Platform used for Video Visit: Toñito

## 2024-04-08 NOTE — PROGRESS NOTES
"Video-Visit Details    Type of service:  Video Visit    Video Start Time: 2:01 PM  Video End Time: 2:10 PM    Originating Location (pt. Location): Home    Distant Location (provider location):  Offsite (providers home) [facility] [place]     Platform used for Video Visit: Toñito    Nutrition Reassessment  Reason For Visit:  Shira Best is a 58 year old female presenting today for nutrition follow-up, 7 months s/p laparoscopic sleeve gastrectomy 8/29/23  with Dr Higgins.     Pt referred by CONCHIS Brandon.    Anthropometrics  Initial Consult Weight: 252 lbs  Day of Surgery Weight (8/29/23): 221 lbs  Current Weight:   Estimated body mass index is 29.94 kg/m  as calculated from the following:    Height as of 4/5/24: 1.581 m (5' 2.25\").    Weight as of this encounter: 74.8 kg (165 lb).    Weight loss: -87 lbs from initial consult; -56 lbs from day of surgery     Got 3 month labs done, reviewed.     Current Vitamins/Minerals: MVI/minerals 2 tablets once per day. Has a vitamin D gelcap at home, Vitamin B12 500 mcg, Vitamin C. No longer taking Vitamin A.     Nutrition History:  Stomach feels sore right under breasts and down to the belly button, more so when moving/walking. Started over the weekend. Feels more sore today. Back to work last week - sitting at a desk for long periods. She also reports feeling nauseous and \"icky\" after taking night-time meds. Team notified.     Starting and tolerating bariatric soft solids. Pureed diet went well.   Fluids: Water (20-30 oz/day), diet tea (20 oz/day)   Protein: 60 gm/day  Meals a little more than 1/4 cup    Diet Recall:  Protein shake (Premier protein, 600 mg calcium)   B: banana and greek yogurt   L: taco meat with cheese, salsa and a few crackers; pb bread   D: taco meat  Snack: 4-5 snacks daily yogurt, sugar-free pudding, SF popcicle/jello     November 2023: Eating is going ok on the regular diet. Gets full really fast, can't have " a lot to eat. Portions tend to be around 1/2 cup of food at a meal. Eats protein first. Drinking liquids, still incorporating 2 protein shakes per day. Potatoes have been a little harder to tolerate. Eats slowly at meals. Likes fruit most, sometimes meals are harder to eat.     Bowel movements are daily.     January 2024: Was dealing with dry mouth. No longer on gallbladder meds. Plans to start Vitamin A due to be deficient on 3 month labs. Doing well on regular diet. Eating 1/2 cup of food at meals. Trying to increase her water intake, already for the day was at 30 oz. Eating good amounts of protein. Likes fruit. 1 protein shake per day. Knows at times she was eating too fast, so is working on slowing this down.     April 2024: Patient doing well. Patient is now 7 months post op. Admits to more reflux recently, had stopped her medication and started drinking diet soda. Stopped or cut back on the soda and started her medication back up and feels it is improved. Appetite continues to be well controlled. Portion sizes averaging around 1/2-3/4 cup of food at a meal depending what she is eating. Continues to prioritize protein. Feels her hydration goals are being met. Continues to take recommended vitamin/mineral supplements. Getting in physical activity by riding her bike.     Progress with Previous Goals:  1) Follow bariatric regular diet. - met, continues   2) Consume 60+ grams of protein/day. - met, continues   3) Sip on 48-64+ oz of fluids/day- between meals only. - met, continues   4) Eat slowly (>20 min/meal), chewing foods well (to applesauce-like consistency). Met, continues   5) Limit portions to ~1/2 - 3/4 cup/meal until 6 months post op. - met   6) Take the following after a Sleeve Gastrectomy:met, continues   - Multivitamin/minerals: adult dose 1-2 times daily  Ideally want one that provides:   5,000 - 10,000 international units vitamin A daily   800 mg oral folate daily  8 - 22 mg zinc and 1 - 2 mg copper  daily  12 mg Thiamine  - Iron: 45-60 mg elemental (18-36 mg if low risk) - may partly or fully be covered in multivitamin   - Calcium Citrate containing vitamin D: 500 mg 3 times daily or 600 mg 2 times daily     - Separate the calcium from your multivitamin or iron by at least 2 hours.     - Must be a chewable calcium citrate until post-op 3 months     - Options for calcium citrate: Galo calcium citrate chewable, bariatric advantage calcium citrate chewable, Celebrate vitamins calcium citrate chewable, Bariatric Fusion calcium citrate chewable  - Vitamin D - at least 3,000 international units/day between all supplements  - Vitamin B12: sublingual form of at least 500 mcg daily or injection of 1000 mcg monthly     Nutrition Prescription:  Grams Protein: 60 (minimum)   Amount of Fluid: 48-64 oz    Nutrition Diagnosis  Current: Food and nutrition-related knowledge deficit r/t lack of prior exposure to diet instruction beyond 6 months s/p SG as evidenced by recent bariatric surgery and pt interest in diet education/review    Intervention  Materials/Education provided, reviewed bariatric regular consistency diet, protein intake, fluid intake, eating pace, chewing foods well, portion control, sugar/fat intake, recommended vitamin/mineral supplements. Patient demonstrates understanding.     Expected Engagement: good    Goals:  1) Follow bariatric regular diet.   2) Consume 60+ grams of protein/day.  3) Sip on 48-64+ oz of fluids/day- between meals only.  4) Eat slowly (>20 min/meal), chewing foods well (to applesauce-like consistency).  5) Limit portions to ~3/4-1 cup/meal until 12 months post op.  6) Get 1 year post op labs obtained before appointments.   7) Take the following after a Sleeve Gastrectomy:  - Multivitamin/minerals: adult dose 1-2 times daily  Ideally want one that provides:   5,000 - 10,000 international units vitamin A daily   800 mg oral folate daily  8 - 22 mg zinc and 1 - 2 mg copper daily  12 mg  Thiamine  - Iron: 45-60 mg elemental (18-36 mg if low risk) - may partly or fully be covered in multivitamin   - Calcium Citrate containing vitamin D: 500 mg 3 times daily or 600 mg 2 times daily     - Separate the calcium from your multivitamin or iron by at least 2 hours.     - Must be a chewable calcium citrate until post-op 3 months     - Options for calcium citrate: Galo calcium citrate chewable, bariatric advantage calcium citrate chewable, Celebrate vitamins calcium citrate chewable, Bariatric Fusion calcium citrate chewable  - Vitamin D - at least 3,000 international units/day between all supplements  - Vitamin B12: sublingual form of at least 500 mcg daily or injection of 1000 mcg monthly     Post-op Diet Handouts:  Diet Guidelines after Weight-loss Surgery  [email or web address]     Your Stage 5 Diet: Regular Foods  [email or web address]    Supplements after Sleeve Gastrectomy, Gastric Bypass or Single Anastomosis Duodenal Switch  [email or web address]    Keeping Track of Fluids  [email or web address]    Exercises after Weight Loss Surgery (strengthening, when no weight lifting restrictions)  [email or web address]       Follow-Up: 1 year post op.     Time spent with patient: 9 minutes.  Tiffany Suero RD, LD  Clinic # [phone]

## 2024-04-09 ENCOUNTER — VIRTUAL VISIT (OUTPATIENT)
Dept: ENDOCRINOLOGY | Facility: CLINIC | Age: 59
End: 2024-04-09
Payer: COMMERCIAL

## 2024-04-09 ENCOUNTER — DOCUMENTATION ONLY (OUTPATIENT)
Dept: ENDOCRINOLOGY | Facility: CLINIC | Age: 59
End: 2024-04-09

## 2024-04-09 VITALS — WEIGHT: 165 LBS | BODY MASS INDEX: 29.94 KG/M2

## 2024-04-09 DIAGNOSIS — Z71.3 NUTRITIONAL COUNSELING: Primary | ICD-10-CM

## 2024-04-09 DIAGNOSIS — Z98.84 S/P LAPAROSCOPIC SLEEVE GASTRECTOMY: ICD-10-CM

## 2024-04-09 PROCEDURE — 99207 PR NO CHARGE LOS: CPT | Mod: 95

## 2024-04-09 PROCEDURE — 97803 MED NUTRITION INDIV SUBSEQ: CPT | Mod: 95

## 2024-04-09 ASSESSMENT — PAIN SCALES - GENERAL: PAINLEVEL: NO PAIN (0)

## 2024-04-09 NOTE — LETTER
"4/9/2024       RE: Shira Best  [address] [address]     Dear Colleague,    Thank you for referring your patient, Shira Best, to the [facility] [place] at [facility]. Please see a copy of my visit note below.    Video-Visit Details    Type of service:  Video Visit    Video Start Time: 2:01 PM  Video End Time: 2:10 PM    Originating Location (pt. Location): Home    Distant Location (provider location):  Offsite (providers home) [facility] [place]     Platform used for Video Visit: LiveWire Mobile    Nutrition Reassessment  Reason For Visit:  Shira Best is a 58 year old female presenting today for nutrition follow-up, 7 months s/p laparoscopic sleeve gastrectomy 8/29/23  with Dr Higgins.     Pt referred by CONCHIS Brandon.    Anthropometrics  Initial Consult Weight: 252 lbs  Day of Surgery Weight (8/29/23): 221 lbs  Current Weight:   Estimated body mass index is 29.94 kg/m  as calculated from the following:    Height as of 4/5/24: 1.581 m (5' 2.25\").    Weight as of this encounter: 74.8 kg (165 lb).    Weight loss: -87 lbs from initial consult; -56 lbs from day of surgery     Got 3 month labs done, reviewed.     Current Vitamins/Minerals: MVI/minerals 2 tablets once per day. Has a vitamin D gelcap at home, Vitamin B12 500 mcg, Vitamin C. No longer taking Vitamin A.     Nutrition History:  Stomach feels sore right under breasts and down to the belly button, more so when moving/walking. Started over the weekend. Feels more sore today. Back to work last week - sitting at a desk for long periods. She also reports feeling nauseous and \"icky\" after taking night-time meds. Team notified.     Starting and tolerating bariatric soft solids. Pureed diet went well.   Fluids: Water (20-30 oz/day), diet tea (20 oz/day)   Protein: 60 gm/day  Meals a little more than 1/4 " cup    Diet Recall:  Protein shake (Premier protein, 600 mg calcium)   B: banana and greek yogurt   L: taco meat with cheese, salsa and a few crackers; pb bread   D: taco meat  Snack: 4-5 snacks daily yogurt, sugar-free pudding, SF popcicle/jello     November 2023: Eating is going ok on the regular diet. Gets full really fast, can't have a lot to eat. Portions tend to be around 1/2 cup of food at a meal. Eats protein first. Drinking liquids, still incorporating 2 protein shakes per day. Potatoes have been a little harder to tolerate. Eats slowly at meals. Likes fruit most, sometimes meals are harder to eat.     Bowel movements are daily.     January 2024: Was dealing with dry mouth. No longer on gallbladder meds. Plans to start Vitamin A due to be deficient on 3 month labs. Doing well on regular diet. Eating 1/2 cup of food at meals. Trying to increase her water intake, already for the day was at 30 oz. Eating good amounts of protein. Likes fruit. 1 protein shake per day. Knows at times she was eating too fast, so is working on slowing this down.     April 2024: Patient doing well. Patient is now 7 months post op. Admits to more reflux recently, had stopped her medication and started drinking diet soda. Stopped or cut back on the soda and started her medication back up and feels it is improved. Appetite continues to be well controlled. Portion sizes averaging around 1/2-3/4 cup of food at a meal depending what she is eating. Continues to prioritize protein. Feels her hydration goals are being met. Continues to take recommended vitamin/mineral supplements. Getting in physical activity by riding her bike.     Progress with Previous Goals:  1) Follow bariatric regular diet. - met, continues   2) Consume 60+ grams of protein/day. - met, continues   3) Sip on 48-64+ oz of fluids/day- between meals only. - met, continues   4) Eat slowly (>20 min/meal), chewing foods well (to applesauce-like consistency). Met, continues    5) Limit portions to ~1/2 - 3/4 cup/meal until 6 months post op. - met   6) Take the following after a Sleeve Gastrectomy:met, continues   - Multivitamin/minerals: adult dose 1-2 times daily  Ideally want one that provides:   5,000 - 10,000 international units vitamin A daily   800 mg oral folate daily  8 - 22 mg zinc and 1 - 2 mg copper daily  12 mg Thiamine  - Iron: 45-60 mg elemental (18-36 mg if low risk) - may partly or fully be covered in multivitamin   - Calcium Citrate containing vitamin D: 500 mg 3 times daily or 600 mg 2 times daily     - Separate the calcium from your multivitamin or iron by at least 2 hours.     - Must be a chewable calcium citrate until post-op 3 months     - Options for calcium citrate: Galo calcium citrate chewable, bariatric advantage calcium citrate chewable, Celebrate vitamins calcium citrate chewable, Bariatric Fusion calcium citrate chewable  - Vitamin D - at least 3,000 international units/day between all supplements  - Vitamin B12: sublingual form of at least 500 mcg daily or injection of 1000 mcg monthly     Nutrition Prescription:  Grams Protein: 60 (minimum)   Amount of Fluid: 48-64 oz    Nutrition Diagnosis  Current: Food and nutrition-related knowledge deficit r/t lack of prior exposure to diet instruction beyond 6 months s/p SG as evidenced by recent bariatric surgery and pt interest in diet education/review    Intervention  Materials/Education provided, reviewed bariatric regular consistency diet, protein intake, fluid intake, eating pace, chewing foods well, portion control, sugar/fat intake, recommended vitamin/mineral supplements. Patient demonstrates understanding.     Expected Engagement: good    Goals:  1) Follow bariatric regular diet.   2) Consume 60+ grams of protein/day.  3) Sip on 48-64+ oz of fluids/day- between meals only.  4) Eat slowly (>20 min/meal), chewing foods well (to applesauce-like consistency).  5) Limit portions to ~3/4-1 cup/meal until 12  months post op.  6) Get 1 year post op labs obtained before appointments.   7) Take the following after a Sleeve Gastrectomy:  - Multivitamin/minerals: adult dose 1-2 times daily  Ideally want one that provides:   5,000 - 10,000 international units vitamin A daily   800 mg oral folate daily  8 - 22 mg zinc and 1 - 2 mg copper daily  12 mg Thiamine  - Iron: 45-60 mg elemental (18-36 mg if low risk) - may partly or fully be covered in multivitamin   - Calcium Citrate containing vitamin D: 500 mg 3 times daily or 600 mg 2 times daily     - Separate the calcium from your multivitamin or iron by at least 2 hours.     - Must be a chewable calcium citrate until post-op 3 months     - Options for calcium citrate: Galo calcium citrate chewable, bariatric advantage calcium citrate chewable, Celebrate vitamins calcium citrate chewable, Bariatric Fusion calcium citrate chewable  - Vitamin D - at least 3,000 international units/day between all supplements  - Vitamin B12: sublingual form of at least 500 mcg daily or injection of 1000 mcg monthly     Post-op Diet Handouts:  Diet Guidelines after Weight-loss Surgery  [email or web address]     Your Stage 5 Diet: Regular Foods  [email or web address]    Supplements after Sleeve Gastrectomy, Gastric Bypass or Single Anastomosis Duodenal Switch  [email or web address]    Keeping Track of Fluids  [email or web address]    Exercises after Weight Loss Surgery (strengthening, when no weight lifting restrictions)  [email or web address]       Follow-Up: 1 year post op.     Time spent with patient: 9 minutes.  Tiffany Suero RD, LD  Clinic # [phone]

## 2024-04-09 NOTE — PATIENT INSTRUCTIONS
Robert Silva,     Follow-up with RD 1 year post op.     Thank you,    Tiffany Suero, RD, LD  If you would like to schedule or reschedule an appointment with the RD, please call [phone]    Nutrition Goals  1) Follow bariatric regular diet.   2) Consume 60+ grams of protein/day.  3) Sip on 48-64+ oz of fluids/day- between meals only.  4) Eat slowly (>20 min/meal), chewing foods well (to applesauce-like consistency).  5) Limit portions to ~3/4-1 cup/meal until 12 months post op.  6) Get 1 year post op labs obtained before appointments.   7) Take the following after a Sleeve Gastrectomy:  - Multivitamin/minerals: adult dose 1-2 times daily  Ideally want one that provides:   5,000 - 10,000 international units vitamin A daily   800 mg oral folate daily  8 - 22 mg zinc and 1 - 2 mg copper daily  12 mg Thiamine  - Iron: 45-60 mg elemental (18-36 mg if low risk) - may partly or fully be covered in multivitamin   - Calcium Citrate containing vitamin D: 500 mg 3 times daily or 600 mg 2 times daily     - Separate the calcium from your multivitamin or iron by at least 2 hours.     - Must be a chewable calcium citrate until post-op 3 months     - Options for calcium citrate: Galo calcium citrate chewable, bariatric advantage calcium citrate chewable, Celebrate vitamins calcium citrate chewable, Bariatric Fusion calcium citrate chewable  - Vitamin D - at least 3,000 international units/day between all supplements  - Vitamin B12: sublingual form of at least 500 mcg daily or injection of 1000 mcg monthly     Post-op Diet Handouts:  Diet Guidelines after Weight-loss Surgery  [email or web address]     Your Stage 5 Diet: Regular Foods  [email or web address]    Supplements after Sleeve Gastrectomy, Gastric Bypass or Single Anastomosis Duodenal Switch  [email or web address]    Keeping Track of Fluids  [email or web address]    Exercises after Weight Loss Surgery (strengthening, when no weight lifting  restrictions)  [email or web address]         COMPREHENSIVE WEIGHT MANAGEMENT PROGRAM  VIRTUAL SUPPORT GROUPS    At [facility], our Comprehensive Weight Management program offers on-line support groups for patients who are working on weight loss and considering, preparing for, or have had weight loss surgery.     There is no cost for this opportunity.  You are invited to attend the?Virtual Support Groups?provided by any of the following locations:    [facility] via Microsoft Teams with Gemini Jay RN  2.   [place] via Mantex with Daren Kline, PhD, LP  3.   [place] via Mantex with Georgia Abrams RN  4.   [facility] via a Zoom Meeting with CHANNING Olivo    The following Support Group information can also be found on our website:  [email or web address]      [facility] Weight Loss Surgery Support Group  The support group is a patient-lead forum that meets monthly to share experiences, encouragement and education. It is open to those who have had weight loss surgery, are scheduled for surgery, or are considering surgery.   WHEN: This group meets on the 3rd Wednesday of each month from 5:00PM - 6:00PM virtually using Microsoft Teams.   FACILITATOR: Led by Gemini Epperson RD, LD, RN, the program's Clinical Coordinator.   TO REGISTER: Please contact the clinic via CareFlash or call the nurse line directly at [phone] to inform our staff that you would like an invite sent to you and to let us know the email you would like the invite sent to. Prior to the meeting, a link with directions on how to join the meeting will be sent to you.    2024 Meetings   January 17  February 21  March 20  April 17  May 15  Ivelisse 19      [facility] and Specialty [place] [facility] Bariatric Care Support Group?  This is open to all pre- and post- operative bariatric  "surgery patients as well as their support system.   WHEN: This group meets the 3rd Tuesday of each month from 6:30 PM - 8:00 PM virtually using Microsoft Teams.   FACILITATOR: Led by Daren Kline, Ph.D who is a Licensed Psychologist with the [facility] Comprehensive Weight Management Program.   TO REGISTER: Please send an email to Daren Kline, Ph.D.,  at?[email or web address]?if you would like an invitation to the group. Prior to the meeting, a link with directions on how to join the meeting will be sent to you.    2024 Meetings January 16: \"Medication Management and Bariatric Surgery\", Casandra Ceballos, PharmD, Pharmacy Resident at [facility]  February 20: \"A Bariatric Surgery Patient's Perspective\", LEX Rodríguez, [facility], Behavioral Health Clinician at [facility]  March 19  April 16  May 21  Ivelisse 18: \"Nutritional Labeling\", Dietitian speaker to be announced.  November 19: \"Holiday Eating\", Dietitian speaker to be announced.    [facility] and Specialty [facility] Post-Operative Bariatric Surgery Support Group  This is a support group for [facility] bariatric patients (and those external to [facility]) who have had bariatric surgery and are at least 3 months post-surgery.  WHEN: This support group meets the 4th Wednesday of the month from 11:00 AM - 12:00 PM virtually using Microsoft Teams.   FACILITATOR: Led by Certified Bariatric Nurse, Georgia Abrams RN.   TO REGISTER: Please send an email to Georgia at [email or web address] if you would like an invitation to the group.  Prior to the meeting, a link with directions on how to join the meeting will be sent to you.    2024 Meetings  January 24  February 28  March 27  April 24  May 22  Ivelisse 26    [facility] Healthy Lifestyle Group?  This is a 60 minute virtual coaching group for those who want to lead a healthier " "lifestyle. Come together to set goals and overcome barriers in a supportive group environment. We will address the four pillars of health: nutrition, exercise, sleep and emotional well-being.  This group is highly recommended for those who are participating in the 24 week Healthy Lifestyle Plan and our Health Coaching sessions.  WHEN: This group meets the 1st Friday of the month, 12:30 PM - 1:30 PM online, via a zoom meeting.    FACILITATOR: Led by National Board Certified Health and , Georgia Cosby [facility].   TO REGISTER: Please call the Call Center at [phone] to register. You will get an appointment to attend in [place]. Fifteen minutes prior to the meeting, complete the e-check in and you will get the link to join the meeting.    There is no charge to attend this group and space is limited.     2024 Meetings  January 5: \"New Years Vision: Manifest your Best 2024!\" (guided imagery,  journaling and discussion)  February 2: \"Let's Talk\"  March 1: \"10 Percent Happier\" by Yuan Jett (Book Bites - a guided discussion on the nuggets of wisdom from favorite wellness books, no need to read the book but highly encouraged)  April 5: \"Let's Talk\"  May 3: \"Essentialism: The Disciplined Pursuit of Less\" by Vlad Wade (Book Bites discussion)  June 7: \"Let's Talk\"  July 5: NO MEETING, off for the 4th of July Holiday  August 2: \"The Blue Zones, Secrets for Living a Longer Life\" by Yuan Nuñez (Book Bites discussion)        "

## 2024-04-10 ENCOUNTER — TELEPHONE (OUTPATIENT)
Dept: ENDOCRINOLOGY | Facility: CLINIC | Age: 59
End: 2024-04-10
Payer: COMMERCIAL

## 2024-04-10 NOTE — TELEPHONE ENCOUNTER
Left Voicemail (1st Attempt) and Sent Mychart (1st Attempt) for the patient to call back and schedule the following:    Appointment type: Return Ruben  Provider: Maryanne Urena  Return date: 6 mo (around Oct 2024)  Specialty phone number: [phone]  Additional appointment(s) needed: NA  Additonal Notes: NA    
English

## 2024-09-12 NOTE — TELEPHONE ENCOUNTER
Therapy completed. Medication has been discontinued from med list.  
ursodiol (ACTIGALL) 300 MG capsule 60 capsule 5 9/7/2023     Pharmacy was called and they confirm that they have used all the refills available.   Patient will need a refill in two weeks.       Last Office Visit: 1/2/24  Future Office visit:   4/5/24    Routing refill request to provider for review/approval because:  Not on refill protocol    Pilar Butler RN  P Red Flag Triage/MRT     
No

## 2024-10-04 ENCOUNTER — VIRTUAL VISIT (OUTPATIENT)
Dept: ENDOCRINOLOGY | Facility: CLINIC | Age: 59
End: 2024-10-04
Payer: COMMERCIAL

## 2024-10-04 VITALS — BODY MASS INDEX: 26.87 KG/M2 | WEIGHT: 146 LBS | HEIGHT: 62 IN

## 2024-10-04 DIAGNOSIS — E66.813 CLASS 3 SEVERE OBESITY WITH SERIOUS COMORBIDITY AND BODY MASS INDEX (BMI) OF 45.0 TO 49.9 IN ADULT, UNSPECIFIED OBESITY TYPE (H): Primary | ICD-10-CM

## 2024-10-04 DIAGNOSIS — E66.01 CLASS 3 SEVERE OBESITY WITH SERIOUS COMORBIDITY AND BODY MASS INDEX (BMI) OF 45.0 TO 49.9 IN ADULT, UNSPECIFIED OBESITY TYPE (H): Primary | ICD-10-CM

## 2024-10-04 PROCEDURE — 99213 OFFICE O/P EST LOW 20 MIN: CPT | Mod: 95 | Performed by: NURSE PRACTITIONER

## 2024-10-04 RX ORDER — OMEPRAZOLE 40 MG/1
CAPSULE, DELAYED RELEASE ORAL
COMMUNITY

## 2024-10-04 ASSESSMENT — PAIN SCALES - GENERAL: PAINLEVEL: MODERATE PAIN (4)

## 2024-10-04 NOTE — PATIENT INSTRUCTIONS
Continue to be mindful of hydration- add lemon or electrolytes - chloride slightly elevated with topiramate for migraines  Continue to be mindful of hunger   Great work with protein   No changes in vitamin s  Great work with self care and stress management   Follow up 1 year

## 2024-10-04 NOTE — PROGRESS NOTES
Return Bariatric Surgery Note    RE: Shira Best  MR#: [id number]  : 1965  VISIT DATE: Oct 4, 2024      Dear Karla Roberto,    I had the pleasure of seeing your patient, Shira Best, in my post-bariatric surgery assessment clinic.    Assessment & Plan   Problem List Items Addressed This Visit          Digestive    Class 3 severe obesity with serious comorbidity and body mass index (BMI) of 45.0 to 49.9 in adult (H) - Primary     1 year post op. Doing well overall. Reviewed labs from care everywhere and encouraged fluids. Reflux well controlled- will continue omeprazole. Will continue mindfulness around hunger/ cravings.     Continue to be mindful of hydration- add lemon or electrolytes - chloride slightly elevated with topiramate for migraines  Continue to be mindful of hunger   Great work with protein   No changes in vitamin s  Great work with self care and stress management   Follow up 1 year                16 minutes spent by me on the date of the encounter doing chart review, history and exam, documentation and further activities per the note    CHIEF COMPLAINT: Post-bariatric surgery follow-up. [facility] Neyda Campos PA-C  2022 - BMI 46 with COPD, CAD,HTN, HLD, GERD, fibromyalgia, hx of concussion and osteoarthritis. S/p sleeve 2023 Dr. Higgins.     HISTORY OF PRESENT ILLNESS:      10/4/2024     3:15 PM   Questions Regarding Prior Weight Loss Surgery Reviewed With Patient   I had the following weight loss procedure Sleeve Gastrectomy   What year was your surgery?    How has your weight changed since your last visit? I have lost weight   Do you currently have any of the following Heartburn, acid reflux, or GERD (acid reflux disease)?    Hypertension (high blood pressure)?    Hyperlipidemia (high cholesterol, triglycerides)?   Do you have any concerns today? none      antiobesity medications   -topiramate 100mg (for migraines per neurology)       Recent diet changes:   Still  restricted appetite - about a cup of food   Protein first   Some snacking between meals- fruit or yogurt - hungry   Hunger manageable     -Protein- protein shake once daily   -Water- no issues     Recent exercise/activity changes:   Walking   Stationary bike   Just had discectomy- doing back exercises now     Recent stressors:   Work stressful  Mom's alzheimer's worse - shares cares with sister   Biking helps with stress     Recent sleep changes: none    Vitamins/Labs:   Reviewed labs from July in care everywhere     GERD symptoms: omeprazole 40mg once daily -  some food triggers- PB, pasta, ketchup , sauces     Weight History:      10/4/2024     3:15 PM   --   What is your highest lifetime weight? 278   What is your lowest weight since surgery? (In pounds) 146     Initial Weight (lbs): 252.2 lbs  Weight: 66.2 kg (146 lb)  Last Visits Weight: 74.8 kg (165 lb)  Cumulative weight loss (lbs): 106.2  Weight Loss Percentage: 42.11%        10/4/2024     3:15 PM   Questions Regarding Co-Morbidities and Health Concerns Reviewed With Patient   Pre-diabetes Improved   Diabetes II Never   High Blood Pressure Improved   High cholesterol Improved   Heartburn/Reflux Improved   Sleep apnea Never   PCOS Never   Back pain Stayed the same   Joint pain Stayed the same   Lower leg swelling Stayed the same           10/4/2024     3:15 PM   Eating Habits   How many meals do you eat per day? 3   Do you snack between meals? Yes   How much food are you eating at each meal? 1/2 cup to 1 cup   Are you able to separate your meals and liquids by at least 30 minutes? Yes   Are you able to avoid liquid calories? Yes           10/4/2024     3:15 PM   Exercise Questions Reviewed With Patient   How often do you exercise? 3 to 4 times per week   What is the duration of your exercise (in minutes)? 30 Minutes   What types of exercise do you do? walking    other   What keeps you from being more active? Pain       Social History:      10/4/2024     3:15  PM   --   Are you smoking? No   Are you drinking alcohol? No       Medications:  Current Outpatient Medications   Medication Sig Dispense Refill    acetaminophen (TYLENOL) 325 MG tablet Take 650 mg by mouth every 6 hours as needed for mild pain      albuterol (PROAIR HFA/PROVENTIL HFA/VENTOLIN HFA) 108 (90 Base) MCG/ACT inhaler Inhale 1-2 puffs into the lungs every 4 hours as needed for shortness of breath      aspirin (ASA) 81 MG chewable tablet Take 1 tablet (81 mg) by mouth daily Resume taking on POD 2      benzonatate (TESSALON) 200 MG capsule Take 200 mg by mouth 3 times daily as needed for cough      Budeson-Glycopyrrol-Formoterol (BREZTRI AEROSPHERE) 160-9-4.8 MCG/ACT AERO Inhale 2 puffs into the lungs 2 times daily      busPIRone (BUSPAR) 10 MG tablet Take 10 mg by mouth 3 times daily      butalbital-acetaminophen-caffeine (ESGIC) -40 MG tablet Take 1 tablet by mouth as needed      Cyanocobalamin (B-12) 500 MCG SUBL Place 1 tablet under the tongue daily 30 tablet 11    empagliflozin (JARDIANCE) 10 MG TABS tablet Take 10 mg by mouth daily      furosemide (LASIX) 20 MG tablet Take 20 mg by mouth daily      metoprolol succinate ER (TOPROL XL) 25 MG 24 hr tablet Take 25 mg by mouth daily Original Rx for 37.5 mg (1.5 tablet) once daily, however pt has only been doing 1 tablet daily. Per pt MD aware.      nystatin (MYCOSTATIN) 444150 UNIT/GM external powder Apply 60 g topically as needed      ondansetron (ZOFRAN ODT) 4 MG ODT tab Take 1 tablet (4 mg) by mouth every 8 hours as needed for nausea 15 tablet 0    oxyCODONE IR (ROXICODONE) 15 MG tablet Take 7.5 mg by mouth      potassium chloride ER (K-TAB/KLOR-CON) 10 MEQ CR tablet Take 10 mEq by mouth daily      riboflavin 100 MG CAPS Take 1 tablet by mouth 2 times daily      rosuvastatin (CRESTOR) 40 MG tablet Take 1 tablet by mouth At Bedtime      senna-docusate (SENOKOT-S/PERICOLACE) 8.6-50 MG tablet Take 2 tablets by mouth daily as needed for constipation  "(While taking narcotic pain medications.  Stop taking if having loose stools.) 30 tablet 1    sotalol (BETAPACE) 80 MG tablet Take 80 mg by mouth 2 times daily Original Rx was for 1.5 tablet BID but was \"too much\" and dropped dose back to 1 tablet twice daily (per pt MD aware).      spironolactone (ALDACTONE) 25 MG tablet Take 25 mg by mouth daily      topiramate (TOPAMAX) 100 MG tablet Take 100 mg by mouth At Bedtime      traZODone (DESYREL) 100 MG tablet Take 200 mg by mouth At Bedtime      galcanezumab-gnlm (EMGALITY) 120 MG/ML injection  (Patient not taking: Reported on 4/9/2024)      ipratropium - albuterol 0.5 mg/2.5 mg/3 mL (DUONEB) 0.5-2.5 (3) MG/3ML neb solution Take 1 vial by nebulization every 6 hours as needed for shortness of breath, wheezing or cough (Patient not taking: Reported on 1/2/2024)      magnesium 250 MG tablet Take 250 mg by mouth (Patient not taking: Reported on 9/8/2023)      omeprazole (PRILOSEC) 40 MG DR capsule TAKE 1 CAPSULE BY MOUTH ONCE DAILY BEFORE A MEAL DO NOT CRUSH.      prochlorperazine (COMPAZINE) 5 MG tablet Take 1-2 tablets by mouth at onset of migraine headache, limit 3 times per week. (Patient not taking: Reported on 1/2/2024)      vitamin A 3 MG (36997 UNITS) capsule Take 1 capsule (10,000 Units) by mouth daily (Patient not taking: Reported on 4/5/2024) 60 capsule 0     No current facility-administered medications for this visit.         10/4/2024     3:15 PM   --   Do you avoid NSAIDs such as (Ibuprofen, Aleve, Naproxen, Advil)? Yes       ROS:  GI:       10/4/2024     3:15 PM   --   Vomiting No   Diarrhea No   Constipation No   Swallowing trouble No   Abdominal pain No   Heartburn Yes     Skin:       10/4/2024     3:15 PM   BAR RBS ROS - SKIN   Rash in skin folds Yes     Psych:       10/4/2024     3:15 PM   --   Depression No   Anxiety Yes     Female Only:       10/4/2024     3:15 PM   BAR RBS ROS -    Female only None of the above   Stress urinary incontinence No " "      External Order Results on 12/18/2023   Component Date Value Ref Range Status    Vitamin A (External) 12/18/2023 20.4 (L)  32.5 - 78.0 mcg/dL Final              No data to display                  PHYSICAL EXAM:  Objective    Ht 1.581 m (5' 2.25\")   Wt 66.2 kg (146 lb)   BMI 26.49 kg/m             Physical Exam   GENERAL: alert and no distress  EYES: Eyes grossly normal to inspection.  No discharge or erythema, or obvious scleral/conjunctival abnormalities.  RESP: No audible wheeze, cough, or visible cyanosis.    SKIN: Visible skin clear. No significant rash, abnormal pigmentation or lesions.  NEURO: Cranial nerves grossly intact.  Mentation and speech appropriate for age.  PSYCH: Appropriate affect, tone, and pace of words        Sincerely,    Maryanne Urena NP      Virtual Visit Details    Type of service:  Video Visit     Originating Location (pt. Location): Home    Distant Location (provider location):  Off-site  Platform used for Video Visit: [employer]ito"

## 2024-10-04 NOTE — LETTER
10/4/2024       RE: Shira Best  [address] [address]     Dear Colleague,    Thank you for referring your patient, Shira Best, to the [facility] [place] at [facility]. Please see a copy of my visit note below.    Return Bariatric Surgery Note    RE: Shira Best  MR#: [id number]  : 1965  VISIT DATE: Oct 4, 2024      Dear Karla Roberto,    I had the pleasure of seeing your patient, Shira Best, in my post-bariatric surgery assessment clinic.    Assessment & Plan  Problem List Items Addressed This Visit          Digestive    Class 3 severe obesity with serious comorbidity and body mass index (BMI) of 45.0 to 49.9 in adult (H) - Primary     1 year post op. Doing well overall. Reviewed labs from care everywhere and encouraged fluids. Reflux well controlled- will continue omeprazole. Will continue mindfulness around hunger/ cravings.     Continue to be mindful of hydration- add lemon or electrolytes - chloride slightly elevated with topiramate for migraines  Continue to be mindful of hunger   Great work with protein   No changes in vitamin s  Great work with self care and stress management   Follow up 1 year                16 minutes spent by me on the date of the encounter doing chart review, history and exam, documentation and further activities per the note    CHIEF COMPLAINT: Post-bariatric surgery follow-up. [facility] Neyda Toro Beth FORTUNE  2022 - BMI 46 with COPD, CAD,HTN, HLD, GERD, fibromyalgia, hx of concussion and osteoarthritis. S/p sleeve 2023 Dr. Higgins.     HISTORY OF PRESENT ILLNESS:      10/4/2024     3:15 PM   Questions Regarding Prior Weight Loss Surgery Reviewed With Patient   I had the following weight loss procedure Sleeve Gastrectomy   What year was your surgery?    How has your weight changed since your last visit? I have lost weight   Do you currently  have any of the following Heartburn, acid reflux, or GERD (acid reflux disease)?    Hypertension (high blood pressure)?    Hyperlipidemia (high cholesterol, triglycerides)?   Do you have any concerns today? none      antiobesity medications   -topiramate 100mg (for migraines per neurology)       Recent diet changes:   Still restricted appetite - about a cup of food   Protein first   Some snacking between meals- fruit or yogurt - hungry   Hunger manageable     -Protein- protein shake once daily   -Water- no issues     Recent exercise/activity changes:   Walking   Stationary bike   Just had discectomy- doing back exercises now     Recent stressors:   Work stressful  Mom's alzheimer's worse - shares cares with sister   Biking helps with stress     Recent sleep changes: none    Vitamins/Labs:   Reviewed labs from July in care everywhere     GERD symptoms: omeprazole 40mg once daily -  some food triggers- PB, pasta, ketchup , sauces     Weight History:      10/4/2024     3:15 PM   --   What is your highest lifetime weight? 278   What is your lowest weight since surgery? (In pounds) 146     Initial Weight (lbs): 252.2 lbs  Weight: 66.2 kg (146 lb)  Last Visits Weight: 74.8 kg (165 lb)  Cumulative weight loss (lbs): 106.2  Weight Loss Percentage: 42.11%        10/4/2024     3:15 PM   Questions Regarding Co-Morbidities and Health Concerns Reviewed With Patient   Pre-diabetes Improved   Diabetes II Never   High Blood Pressure Improved   High cholesterol Improved   Heartburn/Reflux Improved   Sleep apnea Never   PCOS Never   Back pain Stayed the same   Joint pain Stayed the same   Lower leg swelling Stayed the same           10/4/2024     3:15 PM   Eating Habits   How many meals do you eat per day? 3   Do you snack between meals? Yes   How much food are you eating at each meal? 1/2 cup to 1 cup   Are you able to separate your meals and liquids by at least 30 minutes? Yes   Are you able to avoid liquid calories? Yes            10/4/2024     3:15 PM   Exercise Questions Reviewed With Patient   How often do you exercise? 3 to 4 times per week   What is the duration of your exercise (in minutes)? 30 Minutes   What types of exercise do you do? walking    other   What keeps you from being more active? Pain       Social History:      10/4/2024     3:15 PM   --   Are you smoking? No   Are you drinking alcohol? No       Medications:  Current Outpatient Medications   Medication Sig Dispense Refill     acetaminophen (TYLENOL) 325 MG tablet Take 650 mg by mouth every 6 hours as needed for mild pain       albuterol (PROAIR HFA/PROVENTIL HFA/VENTOLIN HFA) 108 (90 Base) MCG/ACT inhaler Inhale 1-2 puffs into the lungs every 4 hours as needed for shortness of breath       aspirin (ASA) 81 MG chewable tablet Take 1 tablet (81 mg) by mouth daily Resume taking on POD 2       benzonatate (TESSALON) 200 MG capsule Take 200 mg by mouth 3 times daily as needed for cough       Budeson-Glycopyrrol-Formoterol (BREZTRI AEROSPHERE) 160-9-4.8 MCG/ACT AERO Inhale 2 puffs into the lungs 2 times daily       busPIRone (BUSPAR) 10 MG tablet Take 10 mg by mouth 3 times daily       butalbital-acetaminophen-caffeine (ESGIC) -40 MG tablet Take 1 tablet by mouth as needed       Cyanocobalamin (B-12) 500 MCG SUBL Place 1 tablet under the tongue daily 30 tablet 11     empagliflozin (JARDIANCE) 10 MG TABS tablet Take 10 mg by mouth daily       furosemide (LASIX) 20 MG tablet Take 20 mg by mouth daily       metoprolol succinate ER (TOPROL XL) 25 MG 24 hr tablet Take 25 mg by mouth daily Original Rx for 37.5 mg (1.5 tablet) once daily, however pt has only been doing 1 tablet daily. Per pt MD aware.       nystatin (MYCOSTATIN) 108398 UNIT/GM external powder Apply 60 g topically as needed       ondansetron (ZOFRAN ODT) 4 MG ODT tab Take 1 tablet (4 mg) by mouth every 8 hours as needed for nausea 15 tablet 0     oxyCODONE IR (ROXICODONE) 15 MG tablet Take 7.5 mg by mouth        "potassium chloride ER (K-TAB/KLOR-CON) 10 MEQ CR tablet Take 10 mEq by mouth daily       riboflavin 100 MG CAPS Take 1 tablet by mouth 2 times daily       rosuvastatin (CRESTOR) 40 MG tablet Take 1 tablet by mouth At Bedtime       senna-docusate (SENOKOT-S/PERICOLACE) 8.6-50 MG tablet Take 2 tablets by mouth daily as needed for constipation (While taking narcotic pain medications.  Stop taking if having loose stools.) 30 tablet 1     sotalol (BETAPACE) 80 MG tablet Take 80 mg by mouth 2 times daily Original Rx was for 1.5 tablet BID but was \"too much\" and dropped dose back to 1 tablet twice daily (per pt MD aware).       spironolactone (ALDACTONE) 25 MG tablet Take 25 mg by mouth daily       topiramate (TOPAMAX) 100 MG tablet Take 100 mg by mouth At Bedtime       traZODone (DESYREL) 100 MG tablet Take 200 mg by mouth At Bedtime       galcanezumab-gnlm (EMGALITY) 120 MG/ML injection  (Patient not taking: Reported on 4/9/2024)       ipratropium - albuterol 0.5 mg/2.5 mg/3 mL (DUONEB) 0.5-2.5 (3) MG/3ML neb solution Take 1 vial by nebulization every 6 hours as needed for shortness of breath, wheezing or cough (Patient not taking: Reported on 1/2/2024)       magnesium 250 MG tablet Take 250 mg by mouth (Patient not taking: Reported on 9/8/2023)       omeprazole (PRILOSEC) 40 MG DR capsule TAKE 1 CAPSULE BY MOUTH ONCE DAILY BEFORE A MEAL DO NOT CRUSH.       prochlorperazine (COMPAZINE) 5 MG tablet Take 1-2 tablets by mouth at onset of migraine headache, limit 3 times per week. (Patient not taking: Reported on 1/2/2024)       vitamin A 3 MG (30311 UNITS) capsule Take 1 capsule (10,000 Units) by mouth daily (Patient not taking: Reported on 4/5/2024) 60 capsule 0     No current facility-administered medications for this visit.         10/4/2024     3:15 PM   --   Do you avoid NSAIDs such as (Ibuprofen, Aleve, Naproxen, Advil)? Yes       ROS:  GI:       10/4/2024     3:15 PM   --   Vomiting No   Diarrhea No   Constipation No " "  Swallowing trouble No   Abdominal pain No   Heartburn Yes     Skin:       10/4/2024     3:15 PM   BAR RBS ROS - SKIN   Rash in skin folds Yes     Psych:       10/4/2024     3:15 PM   --   Depression No   Anxiety Yes     Female Only:       10/4/2024     3:15 PM   BAR RBS ROS -    Female only None of the above   Stress urinary incontinence No       External Order Results on 12/18/2023   Component Date Value Ref Range Status     Vitamin A (External) 12/18/2023 20.4 (L)  32.5 - 78.0 mcg/dL Final              No data to display                  PHYSICAL EXAM:  Objective   Ht 1.581 m (5' 2.25\")   Wt 66.2 kg (146 lb)   BMI 26.49 kg/m             Physical Exam   GENERAL: alert and no distress  EYES: Eyes grossly normal to inspection.  No discharge or erythema, or obvious scleral/conjunctival abnormalities.  RESP: No audible wheeze, cough, or visible cyanosis.    SKIN: Visible skin clear. No significant rash, abnormal pigmentation or lesions.  NEURO: Cranial nerves grossly intact.  Mentation and speech appropriate for age.  PSYCH: Appropriate affect, tone, and pace of words        Sincerely,    Maryanne Urena NP      Virtual Visit Details    Type of service:  Video Visit     Originating Location (pt. Location): Home    Distant Location (provider location):  Off-site  Platform used for Video Visit: AmWell      Again, thank you for allowing me to participate in the care of your patient.      Sincerely,    Maryanne Urena NP    "

## 2024-10-04 NOTE — NURSING NOTE
Current patient location:  in parking lot    Is the patient currently in the state of [place]? YES    Visit mode:VIDEO    If the visit is dropped, the patient can be reconnected by: VIDEO VISIT: Text to cell phone:   Telephone Information:   Mobile [phone]       Will anyone else be joining the visit? NO  (If patient encounters technical issues they should call [phone]    Are changes needed to the allergy or medication list? No    Are refills needed on medications prescribed by this physician? NO    Rooming Documentation:  Questionnaire(s) completed    Reason for visit: RECHROLAND JACOBS

## 2024-10-07 NOTE — ASSESSMENT & PLAN NOTE
1 year post op. Doing well overall. Reviewed labs from care everywhere and encouraged fluids. Reflux well controlled- will continue omeprazole. Will continue mindfulness around hunger/ cravings.     Continue to be mindful of hydration- add lemon or electrolytes - chloride slightly elevated with topiramate for migraines  Continue to be mindful of hunger   Great work with protein   No changes in vitamin s  Great work with self care and stress management   Follow up 1 year

## 2025-02-10 NOTE — TELEPHONE ENCOUNTER
Attempted to contact patient regarding upcoming Upper endoscopy (EGD) procedure on 3/1/23 for pre assessment questions. Did speak with patient, able to go through medications, phone cut out as writer was starting to go through instructions for holding solid foods and the CLD.     Left message to return call to [phone] #4    Discuss Covid policy and designated  policy.    Pre op exam scheduled: N/A    Arrival time: 1230. Procedure time: 1330    Facility location: [facility]; [address] [place] [address], [address], [place] [id number]    Sedation type: Conscious sedation     Anticoagulants: No    Electronic implanted devices? Yes: pacemaker/ICD    Diabetic? No patient states she takes metformin and jardiance for weightloss, did not advise to hold as they are not for diabetes    Indication for procedure: reflux and considering bariatric surgery    Prep instructions sent via [employer].      Tita Arteaga RN  Endoscopy Procedure Pre Assessment RN          
Attempted to contact patient regarding upcoming Upper endoscopy (EGD) procedure on 3/1/23 for pre assessment questions. No answer.     Left message to return call to [phone] #4    Discuss Covid policy and designated  policy.    Pre op exam scheduled: N/A    Arrival time: 1230. Procedure time: 1330    Facility location: [facility]; [address] [place] [address], [address] Floor, [place] [address]    Sedation type: Conscious sedation     Anticoagulants: No    Electronic implanted devices? No    Diabetic? Yes - Patient to hold oral diabetic medications day of procedure  Hold metformin and jardiance    Indication for procedure: Reflux and considering bariatric surgery    Prep instructions sent via [employer].    Tita Arteaga RN  Endoscopy Procedure Pre Assessment RN          
Patient returned call.     Reviewed prep instructions. Patient had no further questions at this time.     Elizabeth Townsend RN  Endoscopy Procedure Pre Assessment RN      
Rescheduled EGD    Pre assessment questions completed for upcoming Upper endoscopy (EGD) procedure scheduled on 4/12/23    COVID policy reviewed.     Pre-op exam? N/A    Reviewed procedural arrival time 1230, procedure time 1330 and facility location [facility]; [address], [address], [place] [address]    Designated  policy reviewed. Instructed to have someone stay 6 hours post procedure.     Anticoagulation/blood thinners? No    Electronic implanted devices? No    Diabetic? No - Pt takes Metformin and Jardiance for weight loss. No need to hold medications.    Procedure indication: Reflux    Reviewed procedure prep instructions.     Prep instructions sent via [employer].      Patient verbalized understanding and had no questions or concerns at this time.    Elizabeth Salas RN  Endoscopy Procedure Pre Assessment RN      
No

## (undated) DEVICE — NDL INSUFFLATION 13GA 150MM C2202

## (undated) DEVICE — ENDO TROCAR FIRST ENTRY KII FIOS ADV FIX 05X100MM CFF03

## (undated) DEVICE — ENDO POUCH UNIVERSAL RETRIEVAL SYSTEM INZII 12/15MM CD004

## (undated) DEVICE — PREP CHLORAPREP 26ML TINTED HI-LITE ORANGE 930815

## (undated) DEVICE — SYSTEM CALIBRATION GASTRECTOMY SLEEVE VISIGI 3D 40FR 5240

## (undated) DEVICE — LINEN TOWEL PACK X6 WHITE 5487

## (undated) DEVICE — SOL WATER IRRIG 1000ML BOTTLE 2F7114

## (undated) DEVICE — LINEN TOWEL PACK X5 5464

## (undated) DEVICE — ESU LIGASURE MARYLAND VESSEL LAP 44CM XLONG LF1944

## (undated) DEVICE — ESU GROUND PAD ADULT W/CORD E7507

## (undated) DEVICE — Device

## (undated) DEVICE — TUBING SMOKE EVAC PNEUMOCLEAR HIGH FLOW 0620050250

## (undated) DEVICE — STPL POWERED ECHELON LONG 60MM PLEE60A

## (undated) DEVICE — GLOVE BIOGEL PI ULTRATOUCH SZ 7.5 41175

## (undated) DEVICE — CLIP APPLIER ENDO 5MM M/L LIGAMAX EL5ML

## (undated) DEVICE — ENDO TROCAR OPTICAL ACCESS KII Z-THRD 15X100MM C0R37

## (undated) DEVICE — COVER CAMERA IN-LIGHT DISP LT-C02

## (undated) DEVICE — NDL SPINAL 22GA 3.5" QUINCKE 405181

## (undated) DEVICE — ANTIFOG SOLUTION W/FOAM PAD 31142527

## (undated) DEVICE — SYR 30ML LL W/O NDL 302832

## (undated) DEVICE — STPL RELOAD REG TISSUE ECHELON 60 X 3.6MM BLUE GST60B

## (undated) DEVICE — DECANTER VIAL 2006S

## (undated) DEVICE — DRAPE SHEET MED 44X70" 9355

## (undated) DEVICE — DRSG PRIMAPORE 02X3" 7133

## (undated) DEVICE — ENDO TROCAR SLEEVE KII ADV FIXATION 05X100MM CFS02

## (undated) RX ORDER — FAMOTIDINE 20 MG/1
TABLET, FILM COATED ORAL
Status: DISPENSED
Start: 2023-08-29

## (undated) RX ORDER — EPHEDRINE SULFATE 50 MG/ML
INJECTION, SOLUTION INTRAMUSCULAR; INTRAVENOUS; SUBCUTANEOUS
Status: DISPENSED
Start: 2023-08-29

## (undated) RX ORDER — SODIUM CHLORIDE, SODIUM LACTATE, POTASSIUM CHLORIDE, CALCIUM CHLORIDE 600; 310; 30; 20 MG/100ML; MG/100ML; MG/100ML; MG/100ML
INJECTION, SOLUTION INTRAVENOUS
Status: DISPENSED
Start: 2023-08-29

## (undated) RX ORDER — ACETAMINOPHEN 325 MG/10.15ML
LIQUID ORAL
Status: DISPENSED
Start: 2023-08-29

## (undated) RX ORDER — FENTANYL CITRATE 50 UG/ML
INJECTION, SOLUTION INTRAMUSCULAR; INTRAVENOUS
Status: DISPENSED
Start: 2023-08-29

## (undated) RX ORDER — CEFAZOLIN SODIUM/WATER 2 G/20 ML
SYRINGE (ML) INTRAVENOUS
Status: DISPENSED
Start: 2023-08-29

## (undated) RX ORDER — FENTANYL CITRATE 50 UG/ML
INJECTION, SOLUTION INTRAMUSCULAR; INTRAVENOUS
Status: DISPENSED
Start: 2023-04-12

## (undated) RX ORDER — ACETAMINOPHEN 325 MG/1
TABLET ORAL
Status: DISPENSED
Start: 2023-08-29

## (undated) RX ORDER — BUPIVACAINE HYDROCHLORIDE 2.5 MG/ML
INJECTION, SOLUTION EPIDURAL; INFILTRATION; INTRACAUDAL
Status: DISPENSED
Start: 2023-08-29

## (undated) RX ORDER — ENOXAPARIN SODIUM 100 MG/ML
INJECTION SUBCUTANEOUS
Status: DISPENSED
Start: 2023-08-29

## (undated) RX ORDER — ONDANSETRON 2 MG/ML
INJECTION INTRAMUSCULAR; INTRAVENOUS
Status: DISPENSED
Start: 2023-08-29

## (undated) RX ORDER — OXYCODONE HYDROCHLORIDE 5 MG/1
TABLET ORAL
Status: DISPENSED
Start: 2023-08-29

## (undated) RX ORDER — PROPOFOL 10 MG/ML
INJECTION, EMULSION INTRAVENOUS
Status: DISPENSED
Start: 2023-08-29

## (undated) RX ORDER — FENTANYL CITRATE-0.9 % NACL/PF 10 MCG/ML
PLASTIC BAG, INJECTION (ML) INTRAVENOUS
Status: DISPENSED
Start: 2023-08-29